# Patient Record
Sex: MALE | Race: WHITE | NOT HISPANIC OR LATINO
[De-identification: names, ages, dates, MRNs, and addresses within clinical notes are randomized per-mention and may not be internally consistent; named-entity substitution may affect disease eponyms.]

---

## 2017-05-02 ENCOUNTER — RX RENEWAL (OUTPATIENT)
Age: 72
End: 2017-05-02

## 2017-05-23 ENCOUNTER — INPATIENT (INPATIENT)
Facility: HOSPITAL | Age: 72
LOS: 2 days | Discharge: HOME | DRG: 195 | End: 2017-05-26
Attending: INTERNAL MEDICINE | Admitting: INTERNAL MEDICINE
Payer: MEDICARE

## 2017-05-23 VITALS
OXYGEN SATURATION: 96 % | TEMPERATURE: 101 F | HEART RATE: 88 BPM | HEIGHT: 72 IN | RESPIRATION RATE: 19 BRPM | WEIGHT: 171.96 LBS | SYSTOLIC BLOOD PRESSURE: 107 MMHG | DIASTOLIC BLOOD PRESSURE: 64 MMHG

## 2017-05-23 LAB
ALBUMIN SERPL ELPH-MCNC: 3 G/DL — LOW (ref 3.3–5)
ALP SERPL-CCNC: 71 U/L — SIGNIFICANT CHANGE UP (ref 40–120)
ALT FLD-CCNC: 21 U/L — SIGNIFICANT CHANGE UP (ref 12–78)
ANION GAP SERPL CALC-SCNC: 6 MMOL/L — SIGNIFICANT CHANGE UP (ref 5–17)
APPEARANCE UR: CLEAR — SIGNIFICANT CHANGE UP
APTT BLD: 31.3 SEC — SIGNIFICANT CHANGE UP (ref 27.5–37.4)
AST SERPL-CCNC: 26 U/L — SIGNIFICANT CHANGE UP (ref 15–37)
BACTERIA # UR AUTO: ABNORMAL
BASOPHILS # BLD AUTO: 0 K/UL — SIGNIFICANT CHANGE UP (ref 0–0.2)
BASOPHILS NFR BLD AUTO: 0.5 % — SIGNIFICANT CHANGE UP (ref 0–2)
BILIRUB SERPL-MCNC: 0.4 MG/DL — SIGNIFICANT CHANGE UP (ref 0.2–1.2)
BILIRUB UR-MCNC: NEGATIVE — SIGNIFICANT CHANGE UP
BUN SERPL-MCNC: 20 MG/DL — SIGNIFICANT CHANGE UP (ref 7–23)
CALCIUM SERPL-MCNC: 8.9 MG/DL — SIGNIFICANT CHANGE UP (ref 8.5–10.1)
CHLORIDE SERPL-SCNC: 102 MMOL/L — SIGNIFICANT CHANGE UP (ref 96–108)
CO2 SERPL-SCNC: 33 MMOL/L — HIGH (ref 22–31)
COLOR SPEC: YELLOW — SIGNIFICANT CHANGE UP
CREAT SERPL-MCNC: 0.84 MG/DL — SIGNIFICANT CHANGE UP (ref 0.5–1.3)
DIFF PNL FLD: ABNORMAL
EOSINOPHIL # BLD AUTO: 0 K/UL — SIGNIFICANT CHANGE UP (ref 0–0.5)
EOSINOPHIL NFR BLD AUTO: 0.5 % — SIGNIFICANT CHANGE UP (ref 0–6)
EPI CELLS # UR: NEGATIVE — SIGNIFICANT CHANGE UP
GLUCOSE SERPL-MCNC: 101 MG/DL — HIGH (ref 70–99)
GLUCOSE UR QL: NEGATIVE — SIGNIFICANT CHANGE UP
HCT VFR BLD CALC: 41.8 % — SIGNIFICANT CHANGE UP (ref 39–50)
HGB BLD-MCNC: 13.7 G/DL — SIGNIFICANT CHANGE UP (ref 13–17)
INR BLD: 1.02 RATIO — SIGNIFICANT CHANGE UP (ref 0.88–1.16)
KETONES UR-MCNC: ABNORMAL
LACTATE SERPL-SCNC: 1 MMOL/L — SIGNIFICANT CHANGE UP (ref 0.7–2)
LEUKOCYTE ESTERASE UR-ACNC: NEGATIVE — SIGNIFICANT CHANGE UP
LYMPHOCYTES # BLD AUTO: 0.6 K/UL — LOW (ref 1–3.3)
LYMPHOCYTES # BLD AUTO: 6.7 % — LOW (ref 13–44)
MCHC RBC-ENTMCNC: 31.9 PG — SIGNIFICANT CHANGE UP (ref 27–34)
MCHC RBC-ENTMCNC: 32.8 GM/DL — SIGNIFICANT CHANGE UP (ref 32–36)
MCV RBC AUTO: 97.4 FL — SIGNIFICANT CHANGE UP (ref 80–100)
MONOCYTES # BLD AUTO: 0.9 K/UL — SIGNIFICANT CHANGE UP (ref 0–0.9)
MONOCYTES NFR BLD AUTO: 10 % — HIGH (ref 1–9)
NEUTROPHILS # BLD AUTO: 7.4 K/UL — SIGNIFICANT CHANGE UP (ref 1.8–7.4)
NEUTROPHILS NFR BLD AUTO: 82.4 % — HIGH (ref 43–77)
NITRITE UR-MCNC: NEGATIVE — SIGNIFICANT CHANGE UP
PH UR: 6 — SIGNIFICANT CHANGE UP (ref 5–8)
PLATELET # BLD AUTO: 188 K/UL — SIGNIFICANT CHANGE UP (ref 150–400)
POTASSIUM SERPL-MCNC: 4.2 MMOL/L — SIGNIFICANT CHANGE UP (ref 3.5–5.3)
POTASSIUM SERPL-SCNC: 4.2 MMOL/L — SIGNIFICANT CHANGE UP (ref 3.5–5.3)
PROT SERPL-MCNC: 6.5 G/DL — SIGNIFICANT CHANGE UP (ref 6–8.3)
PROT UR-MCNC: NEGATIVE — SIGNIFICANT CHANGE UP
PROTHROM AB SERPL-ACNC: 11.1 SEC — SIGNIFICANT CHANGE UP (ref 9.8–12.7)
RBC # BLD: 4.3 M/UL — SIGNIFICANT CHANGE UP (ref 4.2–5.8)
RBC # FLD: 12.4 % — SIGNIFICANT CHANGE UP (ref 10.3–14.5)
RBC CASTS # UR COMP ASSIST: ABNORMAL /HPF (ref 0–4)
SODIUM SERPL-SCNC: 141 MMOL/L — SIGNIFICANT CHANGE UP (ref 135–145)
SP GR SPEC: 1.01 — SIGNIFICANT CHANGE UP (ref 1.01–1.02)
UROBILINOGEN FLD QL: NEGATIVE — SIGNIFICANT CHANGE UP
WBC # BLD: 8.9 K/UL — SIGNIFICANT CHANGE UP (ref 3.8–10.5)
WBC # FLD AUTO: 8.9 K/UL — SIGNIFICANT CHANGE UP (ref 3.8–10.5)
WBC UR QL: NEGATIVE — SIGNIFICANT CHANGE UP

## 2017-05-23 PROCEDURE — 71020: CPT | Mod: 26

## 2017-05-23 RX ORDER — AZITHROMYCIN 500 MG/1
500 TABLET, FILM COATED ORAL ONCE
Qty: 0 | Refills: 0 | Status: COMPLETED | OUTPATIENT
Start: 2017-05-23 | End: 2017-05-23

## 2017-05-23 RX ORDER — SODIUM CHLORIDE 9 MG/ML
1000 INJECTION INTRAMUSCULAR; INTRAVENOUS; SUBCUTANEOUS ONCE
Qty: 0 | Refills: 0 | Status: COMPLETED | OUTPATIENT
Start: 2017-05-23 | End: 2017-05-23

## 2017-05-23 RX ORDER — CEFTRIAXONE 500 MG/1
1 INJECTION, POWDER, FOR SOLUTION INTRAMUSCULAR; INTRAVENOUS ONCE
Qty: 0 | Refills: 0 | Status: COMPLETED | OUTPATIENT
Start: 2017-05-23 | End: 2017-05-23

## 2017-05-23 RX ADMIN — AZITHROMYCIN 255 MILLIGRAM(S): 500 TABLET, FILM COATED ORAL at 21:56

## 2017-05-23 RX ADMIN — CEFTRIAXONE 100 GRAM(S): 500 INJECTION, POWDER, FOR SOLUTION INTRAMUSCULAR; INTRAVENOUS at 21:10

## 2017-05-23 RX ADMIN — SODIUM CHLORIDE 1000 MILLILITER(S): 9 INJECTION INTRAMUSCULAR; INTRAVENOUS; SUBCUTANEOUS at 21:20

## 2017-05-23 NOTE — ED PROVIDER NOTE - CARE PLAN
Principal Discharge DX:	Fever and chills Principal Discharge DX:	Fever and chills  Secondary Diagnosis:	PNA (pneumonia)

## 2017-05-23 NOTE — ED ADULT NURSE NOTE - OBJECTIVE STATEMENT
received pt with c/o fever  pt evaluated and blood work drawned and sent to lab ua and cxs sent to lab xray done pt medicated  as ordered

## 2017-05-23 NOTE — ED PROVIDER NOTE - OBJECTIVE STATEMENT
pt c/o fevers and chills x few hrs tonight with generalized weakness. no ha, cp, sob, cough, d/n/v, abd pain, rash, diarrhea, dysuria, frequency. pt c/o resp congestion x few days  pmd - sellers

## 2017-05-24 DIAGNOSIS — I21.3 ST ELEVATION (STEMI) MYOCARDIAL INFARCTION OF UNSPECIFIED SITE: ICD-10-CM

## 2017-05-24 DIAGNOSIS — E78.5 HYPERLIPIDEMIA, UNSPECIFIED: ICD-10-CM

## 2017-05-24 DIAGNOSIS — F31.9 BIPOLAR DISORDER, UNSPECIFIED: ICD-10-CM

## 2017-05-24 DIAGNOSIS — J18.9 PNEUMONIA, UNSPECIFIED ORGANISM: ICD-10-CM

## 2017-05-24 DIAGNOSIS — R50.9 FEVER, UNSPECIFIED: ICD-10-CM

## 2017-05-24 DIAGNOSIS — Z29.9 ENCOUNTER FOR PROPHYLACTIC MEASURES, UNSPECIFIED: ICD-10-CM

## 2017-05-24 LAB
ANION GAP SERPL CALC-SCNC: 5 MMOL/L — SIGNIFICANT CHANGE UP (ref 5–17)
BUN SERPL-MCNC: 16 MG/DL — SIGNIFICANT CHANGE UP (ref 7–23)
CALCIUM SERPL-MCNC: 8.8 MG/DL — SIGNIFICANT CHANGE UP (ref 8.5–10.1)
CHLORIDE SERPL-SCNC: 106 MMOL/L — SIGNIFICANT CHANGE UP (ref 96–108)
CO2 SERPL-SCNC: 33 MMOL/L — HIGH (ref 22–31)
CREAT SERPL-MCNC: 0.84 MG/DL — SIGNIFICANT CHANGE UP (ref 0.5–1.3)
GLUCOSE SERPL-MCNC: 96 MG/DL — SIGNIFICANT CHANGE UP (ref 70–99)
HCT VFR BLD CALC: 39.4 % — SIGNIFICANT CHANGE UP (ref 39–50)
HGB BLD-MCNC: 13 G/DL — SIGNIFICANT CHANGE UP (ref 13–17)
MCHC RBC-ENTMCNC: 32 PG — SIGNIFICANT CHANGE UP (ref 27–34)
MCHC RBC-ENTMCNC: 32.9 GM/DL — SIGNIFICANT CHANGE UP (ref 32–36)
MCV RBC AUTO: 97.2 FL — SIGNIFICANT CHANGE UP (ref 80–100)
PLATELET # BLD AUTO: 170 K/UL — SIGNIFICANT CHANGE UP (ref 150–400)
POTASSIUM SERPL-MCNC: 4.3 MMOL/L — SIGNIFICANT CHANGE UP (ref 3.5–5.3)
POTASSIUM SERPL-SCNC: 4.3 MMOL/L — SIGNIFICANT CHANGE UP (ref 3.5–5.3)
PROCALCITONIN SERPL-MCNC: 0.49 NG/ML — HIGH (ref 0–0.05)
RAPID RVP RESULT: DETECTED
RBC # BLD: 4.06 M/UL — LOW (ref 4.2–5.8)
RBC # FLD: 12.2 % — SIGNIFICANT CHANGE UP (ref 10.3–14.5)
RV+EV RNA SPEC QL NAA+PROBE: DETECTED
SODIUM SERPL-SCNC: 144 MMOL/L — SIGNIFICANT CHANGE UP (ref 135–145)
WBC # BLD: 9.5 K/UL — SIGNIFICANT CHANGE UP (ref 3.8–10.5)
WBC # FLD AUTO: 9.5 K/UL — SIGNIFICANT CHANGE UP (ref 3.8–10.5)

## 2017-05-24 PROCEDURE — 99285 EMERGENCY DEPT VISIT HI MDM: CPT

## 2017-05-24 PROCEDURE — 71250 CT THORAX DX C-: CPT | Mod: 26

## 2017-05-24 PROCEDURE — 93010 ELECTROCARDIOGRAM REPORT: CPT

## 2017-05-24 RX ORDER — ASPIRIN/CALCIUM CARB/MAGNESIUM 324 MG
1 TABLET ORAL
Qty: 0 | Refills: 0 | COMMUNITY

## 2017-05-24 RX ORDER — ASPIRIN/CALCIUM CARB/MAGNESIUM 324 MG
81 TABLET ORAL
Qty: 0 | Refills: 0 | Status: DISCONTINUED | OUTPATIENT
Start: 2017-05-24 | End: 2017-05-26

## 2017-05-24 RX ORDER — CEFTRIAXONE 500 MG/1
1 INJECTION, POWDER, FOR SOLUTION INTRAMUSCULAR; INTRAVENOUS EVERY 24 HOURS
Qty: 0 | Refills: 0 | Status: COMPLETED | OUTPATIENT
Start: 2017-05-24 | End: 2017-05-25

## 2017-05-24 RX ORDER — DIVALPROEX SODIUM 500 MG/1
250 TABLET, DELAYED RELEASE ORAL THREE TIMES A DAY
Qty: 0 | Refills: 0 | Status: DISCONTINUED | OUTPATIENT
Start: 2017-05-24 | End: 2017-05-25

## 2017-05-24 RX ORDER — IPRATROPIUM/ALBUTEROL SULFATE 18-103MCG
3 AEROSOL WITH ADAPTER (GRAM) INHALATION EVERY 8 HOURS
Qty: 0 | Refills: 0 | Status: DISCONTINUED | OUTPATIENT
Start: 2017-05-24 | End: 2017-05-26

## 2017-05-24 RX ORDER — AZITHROMYCIN 500 MG/1
500 TABLET, FILM COATED ORAL EVERY 24 HOURS
Qty: 0 | Refills: 0 | Status: DISCONTINUED | OUTPATIENT
Start: 2017-05-24 | End: 2017-05-25

## 2017-05-24 RX ORDER — DIVALPROEX SODIUM 500 MG/1
1 TABLET, DELAYED RELEASE ORAL
Qty: 0 | Refills: 0 | COMMUNITY

## 2017-05-24 RX ORDER — ENOXAPARIN SODIUM 100 MG/ML
40 INJECTION SUBCUTANEOUS DAILY
Qty: 0 | Refills: 0 | Status: DISCONTINUED | OUTPATIENT
Start: 2017-05-24 | End: 2017-05-26

## 2017-05-24 RX ORDER — DIVALPROEX SODIUM 500 MG/1
0 TABLET, DELAYED RELEASE ORAL
Qty: 0 | Refills: 0 | COMMUNITY

## 2017-05-24 RX ORDER — ACETAMINOPHEN 500 MG
650 TABLET ORAL ONCE
Qty: 0 | Refills: 0 | Status: COMPLETED | OUTPATIENT
Start: 2017-05-24 | End: 2017-05-24

## 2017-05-24 RX ORDER — ACETAMINOPHEN 500 MG
650 TABLET ORAL EVERY 6 HOURS
Qty: 0 | Refills: 0 | Status: DISCONTINUED | OUTPATIENT
Start: 2017-05-24 | End: 2017-05-26

## 2017-05-24 RX ORDER — LACTOBACILLUS ACIDOPHILUS 100MM CELL
1 CAPSULE ORAL
Qty: 0 | Refills: 0 | Status: DISCONTINUED | OUTPATIENT
Start: 2017-05-24 | End: 2017-05-26

## 2017-05-24 RX ORDER — ESCITALOPRAM OXALATE 10 MG/1
10 TABLET, FILM COATED ORAL DAILY
Qty: 0 | Refills: 0 | Status: DISCONTINUED | OUTPATIENT
Start: 2017-05-24 | End: 2017-05-26

## 2017-05-24 RX ORDER — SODIUM CHLORIDE 9 MG/ML
1000 INJECTION INTRAMUSCULAR; INTRAVENOUS; SUBCUTANEOUS
Qty: 0 | Refills: 0 | Status: DISCONTINUED | OUTPATIENT
Start: 2017-05-24 | End: 2017-05-24

## 2017-05-24 RX ORDER — ATORVASTATIN CALCIUM 80 MG/1
1 TABLET, FILM COATED ORAL
Qty: 0 | Refills: 0 | COMMUNITY

## 2017-05-24 RX ORDER — ESCITALOPRAM OXALATE 10 MG/1
1 TABLET, FILM COATED ORAL
Qty: 0 | Refills: 0 | COMMUNITY

## 2017-05-24 RX ORDER — ESCITALOPRAM OXALATE 10 MG/1
0 TABLET, FILM COATED ORAL
Qty: 0 | Refills: 0 | COMMUNITY

## 2017-05-24 RX ORDER — ATORVASTATIN CALCIUM 80 MG/1
0 TABLET, FILM COATED ORAL
Qty: 0 | Refills: 0 | COMMUNITY

## 2017-05-24 RX ORDER — ATORVASTATIN CALCIUM 80 MG/1
20 TABLET, FILM COATED ORAL AT BEDTIME
Qty: 0 | Refills: 0 | Status: DISCONTINUED | OUTPATIENT
Start: 2017-05-24 | End: 2017-05-26

## 2017-05-24 RX ADMIN — CEFTRIAXONE 100 GRAM(S): 500 INJECTION, POWDER, FOR SOLUTION INTRAMUSCULAR; INTRAVENOUS at 21:01

## 2017-05-24 RX ADMIN — SODIUM CHLORIDE 75 MILLILITER(S): 9 INJECTION INTRAMUSCULAR; INTRAVENOUS; SUBCUTANEOUS at 09:26

## 2017-05-24 RX ADMIN — AZITHROMYCIN 255 MILLIGRAM(S): 500 TABLET, FILM COATED ORAL at 21:01

## 2017-05-24 RX ADMIN — Medication 1 TABLET(S): at 09:26

## 2017-05-24 RX ADMIN — ENOXAPARIN SODIUM 40 MILLIGRAM(S): 100 INJECTION SUBCUTANEOUS at 12:20

## 2017-05-24 RX ADMIN — Medication 81 MILLIGRAM(S): at 06:32

## 2017-05-24 RX ADMIN — Medication 650 MILLIGRAM(S): at 02:39

## 2017-05-24 RX ADMIN — DIVALPROEX SODIUM 250 MILLIGRAM(S): 500 TABLET, DELAYED RELEASE ORAL at 17:25

## 2017-05-24 RX ADMIN — Medication 3 MILLILITER(S): at 08:11

## 2017-05-24 RX ADMIN — Medication 3 MILLILITER(S): at 16:42

## 2017-05-24 RX ADMIN — ESCITALOPRAM OXALATE 10 MILLIGRAM(S): 10 TABLET, FILM COATED ORAL at 12:21

## 2017-05-24 RX ADMIN — DIVALPROEX SODIUM 250 MILLIGRAM(S): 500 TABLET, DELAYED RELEASE ORAL at 21:01

## 2017-05-24 RX ADMIN — DIVALPROEX SODIUM 250 MILLIGRAM(S): 500 TABLET, DELAYED RELEASE ORAL at 06:32

## 2017-05-24 RX ADMIN — Medication 1 TABLET(S): at 17:25

## 2017-05-24 RX ADMIN — Medication 3 MILLILITER(S): at 23:55

## 2017-05-24 RX ADMIN — ATORVASTATIN CALCIUM 20 MILLIGRAM(S): 80 TABLET, FILM COATED ORAL at 21:01

## 2017-05-24 RX ADMIN — Medication 81 MILLIGRAM(S): at 17:25

## 2017-05-24 NOTE — H&P ADULT - NEGATIVE ENMT SYMPTOMS
no dysphagia/no sinus symptoms/no vertigo/no ear pain/no dry mouth/no throat pain/no hearing difficulty/no tinnitus

## 2017-05-24 NOTE — H&P ADULT - PROBLEM SELECTOR PLAN 1
Likely CAP  Admit to medicine  IV azithro , rocephin  Ct chest  Tylenol for fever PRN  AM labs  Blood cultures sent Likely CAP with ac febrile illness & + RVP  Admit to medicine  IV azithro , rocephin continue  Ct chest-+ RT Lung PNA  Tylenol for fever PRN  AM labs  Blood cultures sent

## 2017-05-24 NOTE — H&P ADULT - HISTORY OF PRESENT ILLNESS
71 year old male with PMH of MI , HLD , Bipolar disorder presents to the er for fever , chills and sob that started 8pm yesterday when patient was at his sons reharsal band function. Patient states that he started feeling chills and had difficulty ambulating and SOB at rest. Patient has been having URI , nasal congestion and white mucus (productive cough for the past week. Patient has been taking tylenol cold with minimal relief. Denies chest pain , headaches, lightheadeness , n/v , abdominal pain, diarrhea No recent travel. No sickcontacts.     In the Ed, Vitals significant for Tmax 101.3  Labs normal  Sat 96 on RA  CXR - unofficial read- Right middle lobe infiltrate? 71 year old male with PMH of MI , HLD , Bipolar disorder presents to the er for fever , chills and sob that started 8pm yesterday when patient was at his sons reharsal band function. Patient states that he started feeling chills and had difficulty ambulating and SOB at rest. Patient has been having URI , nasal congestion and white mucus (productive cough for the past week. Patient has been taking tylenol cold with minimal relief. Denies chest pain , headaches, lightheadeness , n/v , abdominal pain, diarrhea No recent travel. No sickcontacts.     In the Ed, Vitals significant for Tmax 101.3  Labs normal  Sat 96 on RA  CXR - unofficial read by ER doc - Right  lobe infiltrate? 71 year old male with PMH of MI , HLD , Bipolar disorder presents to the er for fever , chills and sob that started 8pm yesterday when patient was at his sons reharsal band function. Patient states that he started feeling chills and had difficulty ambulating and SOB at rest. Patient has been having URI ,nasal congestion and white mucus (productive cough for the past week. Patient has been taking tylenol cold with minimal relief. Denies chest pain , headaches, lightheadedness , n/v , abdominal pain, diarrhea No recent travel. as per pt he had sick contacts with his grand children 1 week ago.     In the Ed, Vitals significant for Tmax 101.3  Labs normal, Pt got IV Zithro & Rocephin  Sat 96 on RA  CXR - unofficial read by ER doc - Right  lobe infiltrate?

## 2017-05-24 NOTE — H&P ADULT - ASSESSMENT
71 year old male with PMH of MI , HLD , Bipolar disorder admitted for CAP 71 year old male with PMH of MI , HLD , Bipolar disorder admitted for r/o CAP 71 year old male with PMH of MI , HLD , Bipolar disorder admitted for RT Lung CAP, + RVP

## 2017-05-24 NOTE — PROGRESS NOTE ADULT - ASSESSMENT
71 year old male with PMH of MI , HLD , Bipolar disorder admitted for CAP 71 year old male with PMH of MI , HLD , Bipolar disorder admitted for CAP: 71 year old male with PMH of MI , HLD , Bipolar disorder admitted for CAP:RT LL with Ac febrile Viral illness.

## 2017-05-24 NOTE — H&P ADULT - RS GEN PE MLT RESP DETAILS PC
breath sounds equal/no rales/no subcutaneous emphysema/no rhonchi/normal/airway patent/no wheezes/respirations non-labored/clear to auscultation bilaterally rhonchi/no rales/no wheezes/diminished breath sounds, R/normal/clear to auscultation bilaterally/breath sounds equal/respirations non-labored/airway patent/no subcutaneous emphysema

## 2017-05-24 NOTE — PROGRESS NOTE ADULT - PROBLEM SELECTOR PLAN 2
Continue Divalproex and lexapro Continue Divalproex and lexapro daily Continue Divalproex and Lexapro daily

## 2017-05-24 NOTE — CONSULT NOTE ADULT - SUBJECTIVE AND OBJECTIVE BOX
Infectious Diseases Consult by Graham Lopez MD    Reason for Consult : Febrile syndrome with possible pneumonia      HPI:  71 year old male with PMH of MI , HLD , Bipolar disorder presents to the ER with fever , chills and sob that started 8pm yesterday when patient was at his sons rehearsal band function. Patient states that he started feeling chills , very weak and had difficulty ambulating and getting up and SOB at rest. Patient has had a  URI , nasal congestion and white mucus (productive cough for the past week. Patient has been taking Tylenol cold with minimal relief. Denies chest pain , headaches, lightheadedness , n/v , abdominal pain, diarrhea No recent travel. No sick contacts or travel .     In the Ed, Vitals significant for Tmax 101.3  Labs normal  Sat 96 on RA  CXR - possible  Right perihilar infiltrate?     RVP + for rhinovirus     Past Medical & Surgical Hx:  PAST MEDICAL & SURGICAL HISTORY:  MI (myocardial infarction)  H/O gastric ulcer: back in   Bipolar disorder  History of sub total gastrectomy: in , for gastric ulcer      Social History-- Retired   EtOH: denies   Tobacco: ex smoker quit 30 years ago smoked for less than 15 years   Drug Use: denies     FAMILY HISTORY:  Family history of heart attack (Father)      Allergies    No Known Allergies    Intolerances    Home/ Out patient  Medications :   Patient Currently Takes Medications as of 24-May-2017 02:15 documented in Prescription Writer  · 	atorvastatin 20 mg oral tablet: 1 tab(s) orally once a day, Last Dose Taken:    · 	Lexapro 10 mg oral tablet: 1 tab(s) orally once a day, Last Dose Taken:    · 	aspirin 81 mg oral tablet: 1 tab(s) orally 2 times a day, Last Dose Taken:            · 	Depakote 250 mg oral delayed release tablet: 1 tab(s) orally 3 times a day, Last Dose Taken:      Current Inpatient Medications :    ANTIBIOTICS:   cefTRIAXone   IVPB 1Gram(s) IV Intermittent every 24 hours  azithromycin  IVPB 500milliGRAM(s) IV Intermittent every 24 hours      OTHER RELEVANT MEDICATIONS :  acetaminophen   Tablet 650milliGRAM(s) Oral every 6 hours PRN  aspirin enteric coated 81milliGRAM(s) Oral two times a day  diVALproex DR 250milliGRAM(s) Oral three times a day  escitalopram 10milliGRAM(s) Oral daily  atorvastatin 20milliGRAM(s) Oral at bedtime  sodium chloride 0.9%. 1000milliLiter(s) IV Continuous <Continuous>  enoxaparin Injectable 40milliGRAM(s) SubCutaneous daily  ALBUTerol/ipratropium for Nebulization 3milliLiter(s) Nebulizer every 8 hours        ROS:  CONSTITUTIONAL:  Positive for fever and chills, feels weak, good appetite  EYES:  Negative  blurry vision or double vision  CARDIOVASCULAR:  Negative for chest pain or palpitations  RESPIRATORY:  positive  for cough, NO wheezing, or SOB   GASTROINTESTINAL:  Negative for nausea, vomiting, diarrhea, constipation, or abdominal pain  GENITOURINARY:  Negative frequency, urgency , dysuria or hematuria   NEUROLOGIC:  Positive for  dizziness, lightheadedness  All other systems were reviewed and are negative          I&O's Detail      Physical Exam:  Vital Signs Last 24 Hrs  T(C): 36.9, Max: 38.5 ( @ 21:35)  T(F): 98.5, Max: 101.3 ( @ 21:35)  HR: 71 (63 - 88)  BP: 131/- (107/64 - 131/-)  BP(mean): 71 (71 - 71)  RR: 17 (16 - 19)  SpO2: 96% (96% - 97%)  Height (cm): 182.9 ( @ 21:35)  Weight (kg): 78 ( @ 21:35)  BMI (kg/m2): 23.3 ( @ 21:35)  BSA (m2): 2 ( @ 21:35)  GEN: NAD, pleasant  HEENT: normocephalic and atraumatic. EOMI. GILSON. Conjuctival  & sclera clear,Moist mucosa. Clear Posterior pharynx.  NECK: Supple. No carotid bruits.  No lymphadenopathy or thyromegaly.  LUNGS: Clear to auscultation.  HEART: Regular rate and rhythm without murmur.  ABDOMEN: Soft, nontender, and nondistended.  Positive bowel sounds.  No hepatosplenomegaly was noted.  EXTREMITIES: Without any cyanosis, clubbing, rash, lesions or edema. Peripheral pulses  2 +  NEUROLOGIC: A & O x 3 , No focal neurological deficits   SKIN: No ulceration or induration present.      Labs:       144  |  106  |  16  ----------------------------<  96  4.3   |  33<H>  |  0.84    Ca    8.8      24 May 2017 07:45    TPro  6.5  /  Alb  3.0<L>  /  TBili  0.4  /  DBili  x   /  AST  26  /  ALT  21  /  AlkPhos  71                            13.0   9.5   )-----------( 170      ( 24 May 2017 06:03 )             39.4       PT/INR - ( 23 May 2017 23:09 )   PT: 11.1 sec;   INR: 1.02 ratio         PTT - ( 23 May 2017 23:09 )  PTT:31.3 sec  Urinalysis Basic - ( 23 May 2017 23:01 )    Color: Yellow / Appearance: Clear / S.015 / pH: x  Gluc: x / Ketone: Trace  / Bili: Negative / Urobili: Negative   Blood: x / Protein: Negative / Nitrite: Negative   Leuk Esterase: Negative / RBC: 11-25 /HPF / WBC Negative   Sq Epi: x / Non Sq Epi: Negative / Bacteria: Occasional      LIVER FUNCTIONS - ( 23 May 2017 23:01 )  Alb: 3.0 g/dL / Pro: 6.5 g/dL / ALK PHOS: 71 U/L / ALT: 21 U/L / AST: 26 U/L / GGT: x           Lactate, Blood (17 @ 23:01)    Lactate, Blood: 1.0 mmol/L    Procalcitonin, Serum (17 @ 07:45)    Procalcitonin, Serum: 0.49: PCT Interpretation    Rapid Respiratory Viral Panel (17 @ 02:24)    Entero/Rhinovirus (RapRVP): Detected        RADIOLOGY & ADDITIONAL STUDIES:  EXAM:  CT CHEST                        PROCEDURE DATE:  2017      INTERPRETATION:  CLINICAL INFORMATION:  Fever.    PROCEDURE:  Using multislice helical CT, 2.5 mm sections were obtained   from the thoracic inlet through the lung bases.  Multiplanar reformatted images.    COMPARISON: Chest radiograph 2017.    FINDINGS:      There is focal nodular airspace consolidation in the right upper lobe,   abutting the major fissure. In addition, there are small areas of poorly   defined ground glass centrilobular nodular opacities right lower lobe, as   well as centrilobular tree-in-bud nodular opacities right upper lobe,   which could reflect infectious/inflammatory airway disease.    There are minimal atelectatic changes at both lung bases.    There is scarring with pleural-based calcifications both lung apices.    The central airways remain patent. No endobronchial lesion is noted.    There are clusters of subcentimeter pretracheal/precarinal mediastinal   lymph nodes.  The evaluation of the pulmonary hilum is limited without intravenous   contrast. There is mildly distended, fluid-filled esophagus. Surgical   anastomosis is present, patient is status post partial gastrectomy.  Recommend further clinical correlation.    Impression:    Right upper lobe airspace consolidation with smaller areas of ground glass   and centrilobular/tree-in-bud nodular opacities, findings likely   represent pneumonia and infectious/inflammatory airway disease.  Recommend short interval follow-up chest CT examination in 10-12 weeks   following the appropriate clinical course of treatment.    JESICA PRICE M.D., ATTENDING RADIOLOGIST  This document has been electronically signed. May 24 2017  7:46AM            Assessment :     71 year old male with PMH of MI , HLD , Bipolar disorder admitted with febrile syndrome and weakness , has had a URI for 1 week, RVP + for rhinovirus , now with cough. CXR shows faint RUL pneumonia with chronic tree and bud appearance suggestive of chronic bronchitis or bronchiectasis. In presence of normal  WBC this could christina atypical pneumonia or viral pneumonia .    Plan:  - send urine for legionella antigen   - follow c s, if negative probably change to po antibiotics in 24-48 hours    - repeat CXR in 1 week   - OOB and ambulate  - DC IVF     Continue with present supportive regime .  Appropriate use of antibiotics and adverse effects reviewed.      I have discussed the above plan of care with patient in detail. He  expressed understanding of the treatment plan . Risks, benefits and alternatives discussed in detail. I have asked if he has any questions or concerns and appropriately addressed them to the best of my ability .      > 45 minutes spent in direct patient care reviewing  the notes, lab data/ imaging , discussion with multidisciplinary team. All questions were addressed and answered to the best of my capacity .    Thank you for allowing me to participate in the care of your patient .      Graham Lopez MD  [phone]

## 2017-05-24 NOTE — ED ADULT NURSE REASSESSMENT NOTE - NS ED NURSE REASSESS COMMENT FT1
pt admitted report given and vital signs stable awaiting transfer to floor
pt admitted awaiting bed asignment

## 2017-05-24 NOTE — H&P ADULT - GASTROINTESTINAL DETAILS
bowel sounds normal/no distention/soft/nontender/no organomegaly/normal/no masses palpable no bruit/bowel sounds normal/no organomegaly/no rigidity/no masses palpable/soft/no guarding/no distention/nontender/normal

## 2017-05-24 NOTE — PROGRESS NOTE ADULT - SUBJECTIVE AND OBJECTIVE BOX
Patient is a 71y old  Male who presents with a chief complaint of c/o shaking of both hand and weakness from yesterday (24 May 2017 06:35)      INTERVAL HPI:   71 year old male with PMH of MI , HLD , Bipolar disorder presents to the er for fever , chills and sob that started 8pm yesterday when patient was at his sons rehars[employer] function. Patient states that he started feeling chills and had difficulty ambulating and SOB at rest. Patient has been having URI , nasal congestion and white mucus (productive cough for the past week. Patient has been taking tylenol cold with minimal relief. Denies chest pain , headaches, lightheadeness , n/v , abdominal pain, diarrhea No recent travel. No sickcontacts.     In the Ed, Vitals significant for Tmax 101.3  Labs normal  Patient has been admitted for pneumonia treatment:     Today, pt seen and assessed at bedside; pt states that he feels:         OVERNIGHT EVENTS:    MEDICATIONS  (STANDING):  cefTRIAXone   IVPB 1Gram(s) IV Intermittent every 24 hours  azithromycin  IVPB 500milliGRAM(s) IV Intermittent every 24 hours  aspirin enteric coated 81milliGRAM(s) Oral two times a day  diVALproex DR 250milliGRAM(s) Oral three times a day  escitalopram 10milliGRAM(s) Oral daily  atorvastatin 20milliGRAM(s) Oral at bedtime  sodium chloride 0.9%. 1000milliLiter(s) IV Continuous <Continuous>  enoxaparin Injectable 40milliGRAM(s) SubCutaneous daily  ALBUTerol/ipratropium for Nebulization 3milliLiter(s) Nebulizer every 8 hours  lactobacillus acidophilus 1Tablet(s) Oral two times a day with meals    MEDICATIONS  (PRN):  acetaminophen   Tablet 650milliGRAM(s) Oral every 6 hours PRN For Temp greater than 38 C (100.4 F)      Allergies    No Known Allergies    Intolerances        REVIEW OF SYSTEMS:  CONSTITUTIONAL: No fever, No chills, No fatigue, No myalgia, No Body ache  EYES: No eye pain, visual disturbances, or discharge  ENMT:  No ear pain, No nose bleed, No vertigo; No sinus or throat pain, No Congestion  NECK: No pain, No stiffness  RESPIRATORY: No cough, wheezing, No  hemoptysis, No shortness of breath  CARDIOVASCULAR: No chest pain, palpitations  GASTROINTESTINAL: No abdominal or epigastric pain. No nausea, No vomiting; No diarrhea or constipation. [  ] BM  GENITOURINARY: No dysuria, No frequency, No urgency, No hematuria, or incontinence  NEUROLOGICAL: No headaches, No dizziness, No numbness, No tingling, No tremors, No weakness  EXT: No Swelling, No Pain, No Edema  SKIN:  [  ] No itching, burning, rashes, or lesions   MUSCULOSKELETAL: No joint pain or swelling; No muscle pain, No back pain, No extremity pain  PSYCHIATRIC: No depression, anxiety, mood swings or difficulty sleeping at night  PAIN SCALE: [  ] None  [  ] Other-  ROS Unable to obtain due to - [  ] Dementia  [  ] Lethargy  [  ] Sedated   REST OF REVIEW Of SYSTEM - [  ] Normal     Vital Signs Last 24 Hrs  T(C): 36.9, Max: 38.5 (05-23 @ 21:35)  T(F): 98.5, Max: 101.3 (05-23 @ 21:35)  HR: 71 (63 - 88)  BP: 131/- (107/64 - 131/-)  BP(mean): 71 (71 - 71)  RR: 17 (16 - 19)  SpO2: 96% (96% - 97%)  Finger Stick          PHYSICAL EXAM:  GENERAL:  [  ] NAD , [  ] well appearing, [  ] Agitated, [  ] Lethargy, [  ] confused   HEAD:  [  ] Normal, [  ] Other  EYES:  [  ] EOMI, [  ] PERRLA, [  ] conjunctiva and sclera clear normal, [  ] Other,  [  ] Pallor,[  ] Discharge  ENMT:  [  ] Normal, [  ] Moist mucous membranes, [  ] Good dentition, [  ] No Thrush  NECK:  [  ] Supple, [  ] No JVD, [  ] Normal thyroid, [  ] Lymphadenopathy [  ] Other  NERVOUS SYSTEM:  [  ] Alert & Oriented X3, [  ] Nonfocal   [  ] Confusion  [  ] Encephalopathic [  ] Sedated [  ] Other-  CHEST/LUNG:  [  ] Clear to auscultation bilaterally, [  ] No rales, [  ] No rhonchi  [  ]  No wheezing  HEART:  [  ] Regular rate and rhythm  [  ] irregular  [  ] No murmurs, rubs, or gallops, [  ] PPM in place (Mfr:  )  ABDOMEN:  [  ] Soft, [  ] Nontender, [  ] Nondistended, [  ]No mass, [  ] Bowel sounds present, [  ] obese  EXTREMITIES: [  ] 2+ Peripheral Pulses, No clubbing, cyanosis,  [  ] edema, [  ] PVD stasis skin changes  LYMPH: No lymphadenopathy noted  SKIN:  [  ] No rashes or lesions, [  ] Pressure Ulcers, [  ] echymosis, [  ] Other    DIET: DASH    LABS:                        13.0   9.5   )-----------( 170      ( 24 May 2017 06:03 )             39.4     24 May 2017 07:45    144    |  106    |  16     ----------------------------<  96     4.3     |  33     |  0.84     Ca    8.8        24 May 2017 07:45    TPro  6.5    /  Alb  3.0    /  TBili  0.4    /  DBili  x      /  AST  26     /  ALT  21     /  AlkPhos  71     23 May 2017 23:01    PT/INR - ( 23 May 2017 23:09 )   PT: 11.1 sec;   INR: 1.02 ratio         PTT - ( 23 May 2017 23:09 )  PTT:31.3 sec  Urinalysis Basic - ( 23 May 2017 23:01 )    Color: Yellow / Appearance: Clear / S.015 / pH: x  Gluc: x / Ketone: Trace  / Bili: Negative / Urobili: Negative   Blood: x / Protein: Negative / Nitrite: Negative   Leuk Esterase: Negative / RBC: 11-25 /HPF / WBC Negative   Sq Epi: x / Non Sq Epi: Negative / Bacteria: Occasional                RECENT CULTURES:        RESPIRATORY CULTURES:  human enterovirus/rhinovirus        cardiac Enzyme:        Anemia panel:          RADIOLOGY & ADDITIONAL TESTS:   CT Chest: Right upper lobe airspace consolidation with smaller areas of ground glass   and centrilobular/tree-in-bud nodular opacities, findings likely   represent pneumonia and infectious/inflammatory airway disease.  Recommend short interval follow-up chest CT examination in 10-12 weeks   following the appropriate clinical course of treatment.        HEALTH ISSUES - PROBLEM Dx:  Need for prophylactic measure: Need for prophylactic measure  MI (myocardial infarction): MI (myocardial infarction)  Bipolar disorder: Bipolar disorder  PNA (pneumonia): PNA (pneumonia)          Consultant(s) Notes Reviewed:  [  ] YES     Care Discussed with [X] Consultants  [  ] Patient  [  ] Family  [  ]   [  ] Social Service  [  ] RN, [  ] Physical Therapy  DVT PPX: [ x ] Lovenox, [  ] S C Heparin, [  ] Coumadin, [  ] Xarelto, [  ] Eliquis, [  ] SCD   Advanced directive: [  ] None, [  ] DNR/DNI Patient is a 71y old  Male who presents with a chief complaint of c/o shaking of both hand and weakness from yesterday (24 May 2017 06:35)    INTERVAL HPI: 71 year old male with PMH of MI , HLD , Bipolar disorder presents to the er for fever , chills and sob that started 8pm yesterday when patient was at his sons [employer]hars[employer] function. Patient states that he started feeling chills and had difficulty ambulating and SOB at rest. Patient has been having URI , nasal congestion and white mucus (productive cough for the past week. Patient has been taking tylenol cold with minimal relief. Denies chest pain , headaches, lightheadeness , n/v , abdominal pain, diarrhea No recent travel. No sickcontacts.     In the Ed, Vitals significant for Tmax 101.3  Labs normal  Patient has been admitted for pneumonia treatment:     Today, pt seen and assessed at bedside; pt states that he feels:         OVERNIGHT EVENTS:    MEDICATIONS  (STANDING):  cefTRIAXone   IVPB 1Gram(s) IV Intermittent every 24 hours  azithromycin  IVPB 500milliGRAM(s) IV Intermittent every 24 hours  aspirin enteric coated 81milliGRAM(s) Oral two times a day  diVALproex DR 250milliGRAM(s) Oral three times a day  escitalopram 10milliGRAM(s) Oral daily  atorvastatin 20milliGRAM(s) Oral at bedtime  sodium chloride 0.9%. 1000milliLiter(s) IV Continuous <Continuous>  enoxaparin Injectable 40milliGRAM(s) SubCutaneous daily  ALBUTerol/ipratropium for Nebulization 3milliLiter(s) Nebulizer every 8 hours  lactobacillus acidophilus 1Tablet(s) Oral two times a day with meals    MEDICATIONS  (PRN):  acetaminophen   Tablet 650milliGRAM(s) Oral every 6 hours PRN For Temp greater than 38 C (100.4 F)      Allergies    No Known Allergies    Intolerances        REVIEW OF SYSTEMS:  CONSTITUTIONAL: No fever, No chills, No fatigue, No myalgia, No Body ache  EYES: No eye pain, visual disturbances, or discharge  ENMT:  No ear pain, No nose bleed, No vertigo; No sinus or throat pain, No Congestion  NECK: No pain, No stiffness  RESPIRATORY: No cough, wheezing, No  hemoptysis, No shortness of breath  CARDIOVASCULAR: No chest pain, palpitations  GASTROINTESTINAL: No abdominal or epigastric pain. No nausea, No vomiting; No diarrhea or constipation. [  ] BM  GENITOURINARY: No dysuria, No frequency, No urgency, No hematuria, or incontinence  NEUROLOGICAL: No headaches, No dizziness, No numbness, No tingling, No tremors, No weakness  EXT: No Swelling, No Pain, No Edema  SKIN:  [  ] No itching, burning, rashes, or lesions   MUSCULOSKELETAL: No joint pain or swelling; No muscle pain, No back pain, No extremity pain  PSYCHIATRIC: No depression, anxiety, mood swings or difficulty sleeping at night  PAIN SCALE: [  ] None  [  ] Other-  ROS Unable to obtain due to - [  ] Dementia  [  ] Lethargy  [  ] Sedated   REST OF REVIEW Of SYSTEM - [  ] Normal     Vital Signs Last 24 Hrs  T(C): 36.9, Max: 38.5 (05-23 @ 21:35)  T(F): 98.5, Max: 101.3 (05-23 @ 21:35)  HR: 71 (63 - 88)  BP: 131/- (107/64 - 131/-)  BP(mean): 71 (71 - 71)  RR: 17 (16 - 19)  SpO2: 96% (96% - 97%)  Finger Stick          PHYSICAL EXAM:  GENERAL:  [  ] NAD , [  ] well appearing, [  ] Agitated, [  ] Lethargy, [  ] confused   HEAD:  [  ] Normal, [  ] Other  EYES:  [  ] EOMI, [  ] PERRLA, [  ] conjunctiva and sclera clear normal, [  ] Other,  [  ] Pallor,[  ] Discharge  ENMT:  [  ] Normal, [  ] Moist mucous membranes, [  ] Good dentition, [  ] No Thrush  NECK:  [  ] Supple, [  ] No JVD, [  ] Normal thyroid, [  ] Lymphadenopathy [  ] Other  NERVOUS SYSTEM:  [  ] Alert & Oriented X3, [  ] Nonfocal   [  ] Confusion  [  ] Encephalopathic [  ] Sedated [  ] Other-  CHEST/LUNG:  [  ] Clear to auscultation bilaterally, [  ] No rales, [  ] No rhonchi  [  ]  No wheezing  HEART:  [  ] Regular rate and rhythm  [  ] irregular  [  ] No murmurs, rubs, or gallops, [  ] PPM in place (Mfr:  )  ABDOMEN:  [  ] Soft, [  ] Nontender, [  ] Nondistended, [  ]No mass, [  ] Bowel sounds present, [  ] obese  EXTREMITIES: [  ] 2+ Peripheral Pulses, No clubbing, cyanosis,  [  ] edema, [  ] PVD stasis skin changes  LYMPH: No lymphadenopathy noted  SKIN:  [  ] No rashes or lesions, [  ] Pressure Ulcers, [  ] echymosis, [  ] Other    DIET: DASH    LABS:                        13.0   9.5   )-----------( 170      ( 24 May 2017 06:03 )             39.4     24 May 2017 07:45    144    |  106    |  16     ----------------------------<  96     4.3     |  33     |  0.84     Ca    8.8        24 May 2017 07:45    TPro  6.5    /  Alb  3.0    /  TBili  0.4    /  DBili  x      /  AST  26     /  ALT  21     /  AlkPhos  71     23 May 2017 23:01    PT/INR - ( 23 May 2017 23:09 )   PT: 11.1 sec;   INR: 1.02 ratio         PTT - ( 23 May 2017 23:09 )  PTT:31.3 sec  Urinalysis Basic - ( 23 May 2017 23:01 )    Color: Yellow / Appearance: Clear / S.015 / pH: x  Gluc: x / Ketone: Trace  / Bili: Negative / Urobili: Negative   Blood: x / Protein: Negative / Nitrite: Negative   Leuk Esterase: Negative / RBC: 11-25 /HPF / WBC Negative   Sq Epi: x / Non Sq Epi: Negative / Bacteria: Occasional                RECENT CULTURES:        RESPIRATORY CULTURES:  human enterovirus/rhinovirus        cardiac Enzyme:        Anemia panel:          RADIOLOGY & ADDITIONAL TESTS:   CT Chest: Right upper lobe airspace consolidation with smaller areas of ground glass   and centrilobular/tree-in-bud nodular opacities, findings likely   represent pneumonia and infectious/inflammatory airway disease.  Recommend short interval follow-up chest CT examination in 10-12 weeks   following the appropriate clinical course of treatment.        HEALTH ISSUES - PROBLEM Dx:  Need for prophylactic measure: Need for prophylactic measure  MI (myocardial infarction): MI (myocardial infarction)  Bipolar disorder: Bipolar disorder  PNA (pneumonia): PNA (pneumonia)          Consultant(s) Notes Reviewed:  [  ] YES     Care Discussed with [X] Consultants  [  ] Patient  [  ] Family  [  ]   [  ] Social Service  [  ] RN, [  ] Physical Therapy  DVT PPX: [ x ] Lovenox, [  ] S C Heparin, [  ] Coumadin, [  ] Xarelto, [  ] Eliquis, [  ] SCD   Advanced directive: [  ] None, [  ] DNR/DNI Patient is a 71y old  Male who presents with a chief complaint of c/o shaking of both hand and weakness from yesterday (24 May 2017 06:35)    INTERVAL HPI: 71 year old male with PMH of MI , HLD , Bipolar disorder presents to the er for fever , chills and sob that started 8pm yesterday when patient was at his sons [employer]hars[employer] function. Patient states that he started feeling chills and had difficulty ambulating and SOB at rest. Patient has been having URI , nasal congestion and white mucus (productive cough for the past week. Patient has been taking tylenol cold with minimal relief. Denies chest pain , headaches, lightheadeness , n/v , abdominal pain, diarrhea No recent travel. No sickcontacts.     In the Ed, Vitals significant for Tmax 101.3  Labs normal  Patient has been admitted for pneumonia treatment:     Today, pt seen and assessed at bedside; pt states that he feels well, has a resolving dry cough; States that he hasn't had any fevers, chills, CP, SOB,  abdominal pain, LE edema or any other issues.      OVERNIGHT EVENTS: NONE    MEDICATIONS  (STANDING):  cefTRIAXone   IVPB 1Gram(s) IV Intermittent every 24 hours  azithromycin  IVPB 500milliGRAM(s) IV Intermittent every 24 hours  aspirin enteric coated 81milliGRAM(s) Oral two times a day  diVALproex DR 250milliGRAM(s) Oral three times a day  escitalopram 10milliGRAM(s) Oral daily  atorvastatin 20milliGRAM(s) Oral at bedtime  sodium chloride 0.9%. 1000milliLiter(s) IV Continuous <Continuous>  enoxaparin Injectable 40milliGRAM(s) SubCutaneous daily  ALBUTerol/ipratropium for Nebulization 3milliLiter(s) Nebulizer every 8 hours  lactobacillus acidophilus 1Tablet(s) Oral two times a day with meals    MEDICATIONS  (PRN):  acetaminophen   Tablet 650milliGRAM(s) Oral every 6 hours PRN For Temp greater than 38 C (100.4 F)      Allergies    No Known Allergies    Intolerances        REVIEW OF SYSTEMS:  CONSTITUTIONAL: No fever, No chills, No fatigue, No myalgia, No Body ache  EYES: No eye pain, visual disturbances, or discharge  ENMT:  No ear pain, No nose bleed, No vertigo; No sinus or throat pain, No Congestion  NECK: No pain, No stiffness  RESPIRATORY: dry cough, no wheezing, No  hemoptysis, No shortness of breath  CARDIOVASCULAR: No chest pain, palpitations  GASTROINTESTINAL: No abdominal or epigastric pain. No nausea, No vomiting; No diarrhea or constipation. [  ] BM  GENITOURINARY: No dysuria, No frequency, No urgency, No hematuria, or incontinence  NEUROLOGICAL: No headaches, No dizziness, No numbness, No tingling, No tremors, No weakness  EXT: No Swelling, No Pain, No Edema  SKIN:  [  x] No itching, burning, rashes, or lesions   MUSCULOSKELETAL: No joint pain or swelling; No muscle pain, No back pain, No extremity pain  PSYCHIATRIC: No depression, anxiety, mood swings or difficulty sleeping at night  PAIN SCALE: [ x ] None  [  ] Other-  ROS Unable to obtain due to - [  ] Dementia  [  ] Lethargy  [  ] Sedated   REST OF REVIEW Of SYSTEM - [ x] Normal     Vital Signs Last 24 Hrs  T(C): 36.9, Max: 38.5 (- @ 21:35)  T(F): 98.5, Max: 101.3 (05-23 @ 21:35)  HR: 71 (63 - 88)  BP: 131/- (107/64 - 131/-)  BP(mean): 71 (71 - 71)  RR: 17 (16 - 19)  SpO2: 96% (96% - 97%)  Finger Stick          PHYSICAL EXAM:  GENERAL:  [ x ] NAD , [ x ] well appearing, [  ] Agitated, [  ] Lethargy, [  ] confused   HEAD:  [ x ] Normal, [  ] Other  EYES:  [ x ] EOMI, [ x] PERRLA, [  ] conjunctiva and sclera clear normal, [  ] Other,  [  ] Pallor,[  ] Discharge  ENMT:  [ x ] Normal, [ x ] Moist mucous membranes, [ x ] Good dentition, [  ] No Thrush  NECK:  [ x ] Supple, [ x ] No JVD, [ x ] Normal thyroid, [  ] Lymphadenopathy [  ] Other  NERVOUS SYSTEM:  [ x ] Alert & Oriented X3, [  ] Nonfocal   [  ] Confusion  [  ] Encephalopathic [  ] Sedated [  ] Other-  CHEST/LUNG:  [x  ] Clear to auscultation bilaterally, [ x ] No rales, [  x] No rhonchi  [x  ]  No wheezing  HEART:  [ x ] Regular rate and rhythm  [  ] irregular  [  ] No murmurs, rubs, or gallops, [  ] PPM in place (Mfr:  )  ABDOMEN:  [ x ] Soft, [ x ] Nontender, [x ] Nondistended, [x  ]No mass, [ x ] Bowel sounds present, [  ] obese  EXTREMITIES: [ x ] 2+ Peripheral Pulses, No clubbing, cyanosis,  [  ] edema, [  ] PVD stasis skin changes  LYMPH: No lymphadenopathy noted  SKIN:  [ x ] No rashes or lesions, [  ] Pressure Ulcers, [  ] echymosis, [  ] Other    DIET: DASH    LABS:                        13.0   9.5   )-----------( 170      ( 24 May 2017 06:03 )             39.4     24 May 2017 07:45    144    |  106    |  16     ----------------------------<  96     4.3     |  33     |  0.84     Ca    8.8        24 May 2017 07:45    TPro  6.5    /  Alb  3.0    /  TBili  0.4    /  DBili  x      /  AST  26     /  ALT  21     /  AlkPhos  71     23 May 2017 23:01    PT/INR - ( 23 May 2017 23:09 )   PT: 11.1 sec;   INR: 1.02 ratio         PTT - ( 23 May 2017 23:09 )  PTT:31.3 sec  Urinalysis Basic - ( 23 May 2017 23:01 )    Color: Yellow / Appearance: Clear / S.015 / pH: x  Gluc: x / Ketone: Trace  / Bili: Negative / Urobili: Negative   Blood: x / Protein: Negative / Nitrite: Negative   Leuk Esterase: Negative / RBC: 11-25 /HPF / WBC Negative   Sq Epi: x / Non Sq Epi: Negative / Bacteria: Occasional                RECENT CULTURES:        RESPIRATORY CULTURES:  human enterovirus/rhinovirus        cardiac Enzyme:        Anemia panel:          RADIOLOGY & ADDITIONAL TESTS:   CT Chest: Right upper lobe airspace consolidation with smaller areas of ground glass   and centrilobular/tree-in-bud nodular opacities, findings likely   represent pneumonia and infectious/inflammatory airway disease.  Recommend short interval follow-up chest CT examination in 10-12 weeks   following the appropriate clinical course of treatment.        HEALTH ISSUES - PROBLEM Dx:  Need for prophylactic measure: Need for prophylactic measure  MI (myocardial infarction): MI (myocardial infarction)  Bipolar disorder: Bipolar disorder  PNA (pneumonia): PNA (pneumonia)          Consultant(s) Notes Reviewed:  [  ] YES     Care Discussed with [X] Consultants  [  ] Patient  [  ] Family  [  ]   [  ] Social Service  [  ] RN, [  ] Physical Therapy  DVT PPX: [ x ] Lovenox, [  ] S C Heparin, [  ] Coumadin, [  ] Xarelto, [  ] Eliquis, [  ] SCD   Advanced directive: [  ] None, [  ] DNR/DNI Patient is a 71y old  Male who presents with a chief complaint of c/o shaking of both hand and weakness from yesterday (24 May 2017 06:35)    INTERVAL HPI: 71 year old male with PMH of MI , HLD , Bipolar disorder presents to the er for fever , chills and sob that started 8pm yesterday when patient was at his sons [employer]hars[employer] function. Patient states that he started feeling chills and had difficulty ambulating and SOB at rest. Patient has been having URI , nasal congestion and white mucus (productive cough for the past week. Patient has been taking tylenol cold with minimal relief. Denies chest pain , headaches, lightheadeness , n/v , abdominal pain, diarrhea No recent travel. No sickcontacts.     In the Ed, Vitals significant for Tmax 101.3  Labs normal  Patient has been admitted for pneumonia treatment:     Today, pt seen and assessed at bedside; pt states that he feels well, has a resolving dry cough; States that he hasn't had any fevers, chills, CP, SOB,  abdominal pain, LE edema or any other issues.      OVERNIGHT EVENTS: NONE    MEDICATIONS  (STANDING):  cefTRIAXone   IVPB 1Gram(s) IV Intermittent every 24 hours  azithromycin  IVPB 500milliGRAM(s) IV Intermittent every 24 hours  aspirin enteric coated 81milliGRAM(s) Oral two times a day  diVALproex DR 250milliGRAM(s) Oral three times a day  escitalopram 10milliGRAM(s) Oral daily  atorvastatin 20milliGRAM(s) Oral at bedtime  sodium chloride 0.9%. 1000milliLiter(s) IV Continuous <Continuous>  enoxaparin Injectable 40milliGRAM(s) SubCutaneous daily  ALBUTerol/ipratropium for Nebulization 3milliLiter(s) Nebulizer every 8 hours  lactobacillus acidophilus 1Tablet(s) Oral two times a day with meals    MEDICATIONS  (PRN):  acetaminophen   Tablet 650milliGRAM(s) Oral every 6 hours PRN For Temp greater than 38 C (100.4 F)      Allergies    No Known Allergies      REVIEW OF SYSTEMS: I feel better today  CONSTITUTIONAL: No fever, No chills, No fatigue, No myalgia, No Body ache  EYES: No eye pain, visual disturbances, or discharge  ENMT:  No ear pain, No nose bleed, No vertigo; No sinus or throat pain, No Congestion  NECK: No pain, No stiffness  RESPIRATORY: dry cough, no wheezing, No  hemoptysis, No shortness of breath  CARDIOVASCULAR: No chest pain, palpitations  GASTROINTESTINAL: No abdominal or epigastric pain. No nausea, No vomiting; No diarrhea or constipation. [  ] BM  GENITOURINARY: No dysuria, No frequency, No urgency, No hematuria, or incontinence  NEUROLOGICAL: No headaches, No dizziness, No numbness, No tingling, No tremors, No weakness  EXT: No Swelling, No Pain, No Edema  SKIN:  [  x] No itching, burning, rashes, or lesions   MUSCULOSKELETAL: No joint pain or swelling; No muscle pain, No back pain, No extremity pain  PSYCHIATRIC: No depression, anxiety, mood swings or difficulty sleeping at night  PAIN SCALE: [ x ] None  [  ] Other-  ROS Unable to obtain due to - [  ] Dementia  [  ] Lethargy  [  ] Sedated   REST OF REVIEW Of SYSTEM - [ x] Normal     Vital Signs Last 24 Hrs  T(C): 36.9, Max: 38.5 (05-23 @ 21:35)  T(F): 98.5, Max: 101.3 (05-23 @ 21:35)  HR: 71 (63 - 88)  BP: 131/- (107/64 - 131/-)  BP(mean): 71 (71 - 71)  RR: 17 (16 - 19)  SpO2: 96% (96% - 97%)  Finger Stick          PHYSICAL EXAM:  GENERAL:  [ x ] NAD , [ x ] well appearing, [  ] Agitated, [  ] Lethargy, [  ] confused   HEAD:  [ x ] Normal, [  ] Other  EYES:  [ x ] EOMI, [ x] PERRLA, [ x ] conjunctiva and sclera clear normal, [  ] Other,  [  ] Pallor,[  ] Discharge  ENMT:  [ x ] Normal, [ x ] Moist mucous membranes, [ x ] Good dentition, [  ] No Thrush  NECK:  [ x ] Supple, [ x ] No JVD, [ x ] Normal thyroid, [  ] Lymphadenopathy [  ] Other  NERVOUS SYSTEM:  [ x ] Alert & Oriented X3, [  ] Nonfocal   [  ] Confusion  [  ] Encephalopathic [  ] Sedated [  ] Other-  CHEST/LUNG:  [x  ] Clear to auscultation bilaterally, [ x ] No rales, [  x] No rhonchi  [x  ]  No wheezing  HEART:  [ x ] Regular rate and rhythm  [  ] irregular  [ x ] No murmurs, rubs, or gallops, [  ] PPM in place (Mfr:  )  ABDOMEN:  [ x ] Soft, [ x ] Nontender, [x ] Nondistended, [x  ]No mass, [ x ] Bowel sounds present, [  ] obese  EXTREMITIES: [ x ] 2+ Peripheral Pulses, No clubbing, cyanosis,  [  ] edema, [  ] PVD stasis skin changes  LYMPH: No lymphadenopathy noted  SKIN:  [ x ] No rashes or lesions, [  ] Pressure Ulcers, [  ] ecchymosis [  ] Other    DIET: DASH    LABS:                        13.0   9.5   )-----------( 170      ( 24 May 2017 06:03 )             39.4     24 May 2017 07:45    144    |  106    |  16     ----------------------------<  96     4.3     |  33     |  0.84     Ca    8.8        24 May 2017 07:45    TPro  6.5    /  Alb  3.0    /  TBili  0.4    /  DBili  x      /  AST  26     /  ALT  21     /  AlkPhos  71     23 May 2017 23:01    PT/INR - ( 23 May 2017 23:09 )   PT: 11.1 sec;   INR: 1.02 ratio         PTT - ( 23 May 2017 23:09 )  PTT:31.3 sec  Urinalysis Basic - ( 23 May 2017 23:01 )    Color: Yellow / Appearance: Clear / S.015 / pH: x  Gluc: x / Ketone: Trace  / Bili: Negative / Urobili: Negative   Blood: x / Protein: Negative / Nitrite: Negative   Leuk Esterase: Negative / RBC: 11-25 /HPF / WBC Negative   Sq Epi: x / Non Sq Epi: Negative / Bacteria: Occasional    RECENT CULTURES:NONE        RESPIRATORY CULTURES:  human enterovirus/rhinovirus +      RADIOLOGY & ADDITIONAL TESTS:   CT Chest: Right upper lobe airspace consolidation with smaller areas of ground glass   and centrilobular/tree-in-bud nodular opacities, findings likely   represent pneumonia and infectious/inflammatory airway disease.  Recommend short interval follow-up chest CT examination in 10-12 weeks   following the appropriate clinical course of treatment.        HEALTH ISSUES - PROBLEM Dx:  Need for prophylactic measure: Need for prophylactic measure  MI (myocardial infarction): MI (myocardial infarction)  Bipolar disorder: Bipolar disorder  PNA (pneumonia): PNA (pneumonia)          Consultant(s) Notes Reviewed:  [x  ] YES     Care Discussed with [X] Consultants  [ x ] Patient  [ x ] Family  [  ]   [  ] Social Service  [x  ] RN, [  ] Physical Therapy  DVT PPX: [ x ] Lovenox, [  ] S C Heparin, [  ] Coumadin, [  ] Xarelto, [  ] Eliquis, [  ] SCD   Advanced directive: [x  ] None, [  ] DNR/DNI

## 2017-05-25 LAB
ANION GAP SERPL CALC-SCNC: 9 MMOL/L — SIGNIFICANT CHANGE UP (ref 5–17)
BASOPHILS # BLD AUTO: 0 K/UL — SIGNIFICANT CHANGE UP (ref 0–0.2)
BASOPHILS NFR BLD AUTO: 0.3 % — SIGNIFICANT CHANGE UP (ref 0–2)
BUN SERPL-MCNC: 12 MG/DL — SIGNIFICANT CHANGE UP (ref 7–23)
CALCIUM SERPL-MCNC: 9 MG/DL — SIGNIFICANT CHANGE UP (ref 8.5–10.1)
CHLORIDE SERPL-SCNC: 102 MMOL/L — SIGNIFICANT CHANGE UP (ref 96–108)
CO2 SERPL-SCNC: 31 MMOL/L — SIGNIFICANT CHANGE UP (ref 22–31)
CREAT SERPL-MCNC: 0.78 MG/DL — SIGNIFICANT CHANGE UP (ref 0.5–1.3)
CULTURE RESULTS: NO GROWTH — SIGNIFICANT CHANGE UP
EOSINOPHIL # BLD AUTO: 0 K/UL — SIGNIFICANT CHANGE UP (ref 0–0.5)
EOSINOPHIL NFR BLD AUTO: 0.1 % — SIGNIFICANT CHANGE UP (ref 0–6)
GLUCOSE SERPL-MCNC: 108 MG/DL — HIGH (ref 70–99)
HCT VFR BLD CALC: 45.3 % — SIGNIFICANT CHANGE UP (ref 39–50)
HGB BLD-MCNC: 14.5 G/DL — SIGNIFICANT CHANGE UP (ref 13–17)
LEGIONELLA AG UR QL: NEGATIVE — SIGNIFICANT CHANGE UP
LYMPHOCYTES # BLD AUTO: 1 K/UL — SIGNIFICANT CHANGE UP (ref 1–3.3)
LYMPHOCYTES # BLD AUTO: 10.4 % — LOW (ref 13–44)
MCHC RBC-ENTMCNC: 30.8 PG — SIGNIFICANT CHANGE UP (ref 27–34)
MCHC RBC-ENTMCNC: 32 GM/DL — SIGNIFICANT CHANGE UP (ref 32–36)
MCV RBC AUTO: 96.1 FL — SIGNIFICANT CHANGE UP (ref 80–100)
MONOCYTES # BLD AUTO: 0.8 K/UL — SIGNIFICANT CHANGE UP (ref 0–0.9)
MONOCYTES NFR BLD AUTO: 8.4 % — SIGNIFICANT CHANGE UP (ref 1–9)
NEUTROPHILS # BLD AUTO: 7.6 K/UL — HIGH (ref 1.8–7.4)
NEUTROPHILS NFR BLD AUTO: 80.8 % — HIGH (ref 43–77)
PLATELET # BLD AUTO: 166 K/UL — SIGNIFICANT CHANGE UP (ref 150–400)
POTASSIUM SERPL-MCNC: 3.6 MMOL/L — SIGNIFICANT CHANGE UP (ref 3.5–5.3)
POTASSIUM SERPL-SCNC: 3.6 MMOL/L — SIGNIFICANT CHANGE UP (ref 3.5–5.3)
RBC # BLD: 4.71 M/UL — SIGNIFICANT CHANGE UP (ref 4.2–5.8)
RBC # FLD: 12.3 % — SIGNIFICANT CHANGE UP (ref 10.3–14.5)
SODIUM SERPL-SCNC: 142 MMOL/L — SIGNIFICANT CHANGE UP (ref 135–145)
SPECIMEN SOURCE: SIGNIFICANT CHANGE UP
WBC # BLD: 9.5 K/UL — SIGNIFICANT CHANGE UP (ref 3.8–10.5)
WBC # FLD AUTO: 9.5 K/UL — SIGNIFICANT CHANGE UP (ref 3.8–10.5)

## 2017-05-25 RX ORDER — DIVALPROEX SODIUM 500 MG/1
500 TABLET, DELAYED RELEASE ORAL
Qty: 0 | Refills: 0 | Status: DISCONTINUED | OUTPATIENT
Start: 2017-05-25 | End: 2017-05-26

## 2017-05-25 RX ORDER — AZITHROMYCIN 500 MG/1
250 TABLET, FILM COATED ORAL DAILY
Qty: 0 | Refills: 0 | Status: DISCONTINUED | OUTPATIENT
Start: 2017-05-25 | End: 2017-05-26

## 2017-05-25 RX ORDER — DOCUSATE SODIUM 100 MG
100 CAPSULE ORAL DAILY
Qty: 0 | Refills: 0 | Status: DISCONTINUED | OUTPATIENT
Start: 2017-05-25 | End: 2017-05-26

## 2017-05-25 RX ORDER — CEFUROXIME AXETIL 250 MG
500 TABLET ORAL EVERY 12 HOURS
Qty: 0 | Refills: 0 | Status: DISCONTINUED | OUTPATIENT
Start: 2017-05-26 | End: 2017-05-26

## 2017-05-25 RX ORDER — SENNA PLUS 8.6 MG/1
2 TABLET ORAL AT BEDTIME
Qty: 0 | Refills: 0 | Status: DISCONTINUED | OUTPATIENT
Start: 2017-05-25 | End: 2017-05-26

## 2017-05-25 RX ADMIN — Medication 3 MILLILITER(S): at 23:11

## 2017-05-25 RX ADMIN — ESCITALOPRAM OXALATE 10 MILLIGRAM(S): 10 TABLET, FILM COATED ORAL at 11:28

## 2017-05-25 RX ADMIN — Medication 1 TABLET(S): at 11:28

## 2017-05-25 RX ADMIN — CEFTRIAXONE 100 GRAM(S): 500 INJECTION, POWDER, FOR SOLUTION INTRAMUSCULAR; INTRAVENOUS at 22:01

## 2017-05-25 RX ADMIN — Medication 1 TABLET(S): at 17:16

## 2017-05-25 RX ADMIN — Medication 100 MILLIGRAM(S): at 21:59

## 2017-05-25 RX ADMIN — SENNA PLUS 2 TABLET(S): 8.6 TABLET ORAL at 21:58

## 2017-05-25 RX ADMIN — DIVALPROEX SODIUM 250 MILLIGRAM(S): 500 TABLET, DELAYED RELEASE ORAL at 13:13

## 2017-05-25 RX ADMIN — ATORVASTATIN CALCIUM 20 MILLIGRAM(S): 80 TABLET, FILM COATED ORAL at 21:58

## 2017-05-25 RX ADMIN — DIVALPROEX SODIUM 500 MILLIGRAM(S): 500 TABLET, DELAYED RELEASE ORAL at 17:16

## 2017-05-25 RX ADMIN — Medication 3 MILLILITER(S): at 08:13

## 2017-05-25 RX ADMIN — DIVALPROEX SODIUM 250 MILLIGRAM(S): 500 TABLET, DELAYED RELEASE ORAL at 06:02

## 2017-05-25 RX ADMIN — Medication 81 MILLIGRAM(S): at 17:16

## 2017-05-25 RX ADMIN — Medication 3 MILLILITER(S): at 14:44

## 2017-05-25 RX ADMIN — Medication 81 MILLIGRAM(S): at 06:02

## 2017-05-25 RX ADMIN — AZITHROMYCIN 250 MILLIGRAM(S): 500 TABLET, FILM COATED ORAL at 13:13

## 2017-05-25 RX ADMIN — ENOXAPARIN SODIUM 40 MILLIGRAM(S): 100 INJECTION SUBCUTANEOUS at 11:28

## 2017-05-25 NOTE — PROGRESS NOTE ADULT - SUBJECTIVE AND OBJECTIVE BOX
Patient is a 71y old  Male who presents with a chief complaint of c/o shaking of both hand and weakness from yesterday     INTERVAL HPI:  71 year old male with PMH of MI , HLD , Bipolar disorder presents to the er for fever , chills and sob that started 8pm on  when patient was at his sons rehearsal band function. Patient states that he started feeling chills and had difficulty ambulating and SOB at rest. Patient has been having URI , nasal congestion and white mucus (productive cough for the past week. Patient has been taking tylenol cold with minimal relief.     Pt being treated for PUL pneumonia (CT findings showed: Right upper lobe airspace consolidation with smaller areas of ground glass and centrilobular/tree-in-bud nodular opacities, findings likely represent pneumonia and infectious/inflammatory airway disease), pt also tested positive for Enterovirus/ Rhinovirus.      Today, pt seen and assessed at bedside; pt states that he feels:   Pt denied any: fevers, chills, CP, SOB,  abdominal pain, LE edema or any other issues.      OVERNIGHT EVENTS: NONE     MEDICATIONS  (STANDING):  cefTRIAXone   IVPB 1Gram(s) IV Intermittent every 24 hours  azithromycin  IVPB 500milliGRAM(s) IV Intermittent every 24 hours  aspirin enteric coated 81milliGRAM(s) Oral two times a day  diVALproex DR 250milliGRAM(s) Oral three times a day  escitalopram 10milliGRAM(s) Oral daily  atorvastatin 20milliGRAM(s) Oral at bedtime  enoxaparin Injectable 40milliGRAM(s) SubCutaneous daily  ALBUTerol/ipratropium for Nebulization 3milliLiter(s) Nebulizer every 8 hours  lactobacillus acidophilus 1Tablet(s) Oral two times a day with meals    MEDICATIONS  (PRN):  acetaminophen   Tablet 650milliGRAM(s) Oral every 6 hours PRN For Temp greater than 38 C (100.4 F)      Allergies    No Known Allergies    Intolerances        REVIEW OF SYSTEMS:  CONSTITUTIONAL: No fever, No chills, No fatigue, No myalgia, No Body ache  EYES: No eye pain, visual disturbances, or discharge  ENMT:  No ear pain, No nose bleed, No vertigo; No sinus or throat pain, No Congestion  NECK: No pain, No stiffness  RESPIRATORY: No cough, wheezing, No  hemoptysis, No shortness of breath  CARDIOVASCULAR: No chest pain, palpitations  GASTROINTESTINAL: No abdominal or epigastric pain. No nausea, No vomiting; No diarrhea or constipation. [  ] BM  GENITOURINARY: No dysuria, No frequency, No urgency, No hematuria, or incontinence  NEUROLOGICAL: No headaches, No dizziness, No numbness, No tingling, No tremors, No weakness  EXT: No Swelling, No Pain, No Edema  SKIN:  [  ] No itching, burning, rashes, or lesions   MUSCULOSKELETAL: No joint pain or swelling; No muscle pain, No back pain, No extremity pain  PSYCHIATRIC: No depression, anxiety, mood swings or difficulty sleeping at night  PAIN SCALE: [  ] None  [  ] Other-  ROS Unable to obtain due to - [  ] Dementia  [  ] Lethargy  [  ] Sedated   REST OF REVIEW Of SYSTEM - [  ] Normal     Vital Signs Last 24 Hrs  T(C): 37.1, Max: 37.1 ( @ 04:55)  T(F): 98.8, Max: 98.8 ( @ 04:55)  HR: 71 (68 - 77)  BP: 148/72 (120/63 - 148/72)  BP(mean): --  RR: 17 (16 - 17)  SpO2: 97% (91% - 98%)  Finger Stick        I & Os for current day (as of  @ 08:07)  =============================================  IN: 1430 ml / OUT: 350 ml / NET: 1080 ml      PHYSICAL EXAM:  GENERAL:  [  ] NAD , [  ] well appearing, [  ] Agitated, [  ] Lethargy, [  ] confused   HEAD:  [  ] Normal, [  ] Other  EYES:  [  ] EOMI, [  ] PERRLA, [  ] conjunctiva and sclera clear normal, [  ] Other,  [  ] Pallor,[  ] Discharge  ENMT:  [  ] Normal, [  ] Moist mucous membranes, [  ] Good dentition, [  ] No Thrush  NECK:  [  ] Supple, [  ] No JVD, [  ] Normal thyroid, [  ] Lymphadenopathy [  ] Other  NERVOUS SYSTEM:  [  ] Alert & Oriented X3, [  ] Nonfocal   [  ] Confusion  [  ] Encephalopathic [  ] Sedated [  ] Other-  CHEST/LUNG:  [  ] Clear to auscultation bilaterally, [  ] No rales, [  ] No rhonchi  [  ]  No wheezing  HEART:  [  ] Regular rate and rhythm  [  ] irregular  [  ] No murmurs, rubs, or gallops, [  ] PPM in place (Mfr:  )  ABDOMEN:  [  ] Soft, [  ] Nontender, [  ] Nondistended, [  ]No mass, [  ] Bowel sounds present, [  ] obese  EXTREMITIES: [  ] 2+ Peripheral Pulses, No clubbing, cyanosis,  [  ] edema, [  ] PVD stasis skin changes  LYMPH: No lymphadenopathy noted  SKIN:  [  ] No rashes or lesions, [  ] Pressure Ulcers, [  ] echymosis, [  ] Other    DIET:     LABS:                        14.5   9.5   )-----------( 166      ( 25 May 2017 06:52 )             45.3     25 May 2017 06:52    142    |  102    |  12     ----------------------------<  108    3.6     |  31     |  0.78     Ca    9.0        25 May 2017 06:52      PT/INR - ( 23 May 2017 23:09 )   PT: 11.1 sec;   INR: 1.02 ratio         PTT - ( 23 May 2017 23:09 )  PTT:31.3 sec  Urinalysis Basic - ( 23 May 2017 23:01 )    Color: Yellow / Appearance: Clear / S.015 / pH: x  Gluc: x / Ketone: Trace  / Bili: Negative / Urobili: Negative   Blood: x / Protein: Negative / Nitrite: Negative   Leuk Esterase: Negative / RBC: 11-25 /HPF / WBC Negative   Sq Epi: x / Non Sq Epi: Negative / Bacteria: Occasional                RECENT CULTURES:        RESPIRATORY CULTURES:      cardiac Enzyme:        Anemia panel:          RADIOLOGY & ADDITIONAL TESTS:    CT Chest:   Right upper lobe airspace consolidation with smaller areas of groundglass   and centrilobular/tree-in-bud nodular opacities, findings likely   represent pneumonia and infectious/inflammatory airway disease.  Recommend short interval follow-up chest CT examination in 10-12 weeks   following the appropriate clinical course of treatment.      HEALTH ISSUES - PROBLEM Dx:  HLD (hyperlipidemia): HLD (hyperlipidemia)  Need for prophylactic measure: Need for prophylactic measure  MI (myocardial infarction): MI (myocardial infarction)  Bipolar disorder: Bipolar disorder  PNA (pneumonia): PNA (pneumonia)          Consultant(s) Notes Reviewed:  [  ] YES     Care Discussed with [X] Consultants  [  ] Patient  [  ] Family  [  ]   [  ] Social Service  [  ] RN, [  ] Physical Therapy  DVT PPX: [  ] Lovenox, [  ] S C Heparin, [  ] Coumadin, [  ] Xarelto, [  ] Eliquis, [  ] SCD   Advanced directive: [  ] None, [  ] DNR/DNI Patient is a 71y old  Male who presents with a chief complaint of c/o shaking of both hand and weakness from yesterday     INTERVAL HPI:  71 year old male with PMH of MI , HLD , Bipolar disorder presents to the er for fever , chills and sob that started 8pm on  when patient was at his sons rehearsal band function. Patient states that he started feeling chills and had difficulty ambulating and SOB at rest. Patient has been having URI , nasal congestion and white mucus (productive cough for the past week. Patient has been taking tylenol cold with minimal relief.     Pt being treated for PUL pneumonia (CT findings showed: Right upper lobe airspace consolidation with smaller areas of ground glass and centrilobular/tree-in-bud nodular opacities, findings likely represent pneumonia and infectious/inflammatory airway disease), pt also tested positive for Enterovirus/ Rhinovirus.      Today, pt seen and assessed at bedside; pt states that he feels well; pt states that his cough is resolving, had some chills last night--but less severe than when he was admitted; overall pt states that he feels very well.   Pt denied any: fevers,  CP, palpitations, SOB,  abdominal pain, N/V/D/C, LE edema or any other issues.      OVERNIGHT EVENTS: NONE     MEDICATIONS  (STANDING):  cefTRIAXone   IVPB 1Gram(s) IV Intermittent every 24 hours  azithromycin  IVPB 500milliGRAM(s) IV Intermittent every 24 hours  aspirin enteric coated 81milliGRAM(s) Oral two times a day  diVALproex DR 250milliGRAM(s) Oral three times a day  escitalopram 10milliGRAM(s) Oral daily  atorvastatin 20milliGRAM(s) Oral at bedtime  enoxaparin Injectable 40milliGRAM(s) SubCutaneous daily  ALBUTerol/ipratropium for Nebulization 3milliLiter(s) Nebulizer every 8 hours  lactobacillus acidophilus 1Tablet(s) Oral two times a day with meals    MEDICATIONS  (PRN):  acetaminophen   Tablet 650milliGRAM(s) Oral every 6 hours PRN For Temp greater than 38 C (100.4 F)      Allergies    No Known Allergies    Intolerances        REVIEW OF SYSTEMS:  CONSTITUTIONAL: No fever, No chills, No fatigue, No myalgia, No Body ache  EYES: No eye pain, visual disturbances, or discharge  ENMT:  No ear pain, No nose bleed, No vertigo; No sinus or throat pain, No Congestion  NECK: No pain, No stiffness  RESPIRATORY: No cough, wheezing, No  hemoptysis, No shortness of breath  CARDIOVASCULAR: No chest pain, palpitations  GASTROINTESTINAL: No abdominal or epigastric pain. No nausea, No vomiting; No diarrhea or constipation. [  ] BM  GENITOURINARY: No dysuria, No frequency, No urgency, No hematuria, or incontinence  NEUROLOGICAL: No headaches, No dizziness, No numbness, No tingling, No tremors, No weakness  EXT: No Swelling, No Pain, No Edema  SKIN:  [  ] No itching, burning, rashes, or lesions   MUSCULOSKELETAL: No joint pain or swelling; No muscle pain, No back pain, No extremity pain  PSYCHIATRIC: No depression, anxiety, mood swings or difficulty sleeping at night  PAIN SCALE: [  ] None  [  ] Other-  ROS Unable to obtain due to - [  ] Dementia  [  ] Lethargy  [  ] Sedated   REST OF REVIEW Of SYSTEM - [  ] Normal     Vital Signs Last 24 Hrs  T(C): 37.1, Max: 37.1 ( @ 04:55)  T(F): 98.8, Max: 98.8 ( @ 04:55)  HR: 71 (68 - 77)  BP: 148/72 (120/63 - 148/72)  BP(mean): --  RR: 17 (16 - 17)  SpO2: 97% (91% - 98%)  Finger Stick        I & Os for current day (as of  @ 08:07)  =============================================  IN: 1430 ml / OUT: 350 ml / NET: 1080 ml      PHYSICAL EXAM:  GENERAL:  [x ] NAD , [ x ] well appearing, [  ] Agitated, [  ] Lethargy, [  ] confused   HEAD:  [ x ] Normal, [  ] Other  EYES:  [ x ] EOMI, [x] PERRLA, [ x ] conjunctiva and sclera clear normal, [  ] Other,  [  ] Pallor,[  ] Discharge  ENMT:  [ x ] Normal, [x ] Moist mucous membranes, [x  ] Good dentition, [  ] No Thrush  NECK:  [ x ] Supple, [ x ] No JVD, [ x ] Normal thyroid, [  ] Lymphadenopathy [  ] Other  NERVOUS SYSTEM:  [x  ] Alert & Oriented X3, [  ] Nonfocal   [  ] Confusion  [  ] Encephalopathic [  ] Sedated [  ] Other-  CHEST/LUNG:  [x  ] Clear to auscultation bilaterally, [ x No rales, [ x ] No rhonchi  [ x ]  No wheezing  HEART:  [ x Regular rate and rhythm  [  ] irregular  [  ] No murmurs, rubs, or gallops, [  ] PPM in place (Mfr:  )  ABDOMEN:  [ x ] Soft, [x  ] Nontender, [ x ] Nondistended, [ x ]No mass, [ x ] Bowel sounds present, [  ] obese  EXTREMITIES: [ x ] 2+ Peripheral Pulses, No clubbing, cyanosis,  [  ] edema, [  ] PVD stasis skin changes  LYMPH: No lymphadenopathy noted  SKIN:  [x  ] No rashes or lesions, [  ] Pressure Ulcers, [  ] echymosis, [  ] Other    DIET: DASH diet     LABS:                        14.5   9.5   )-----------( 166      ( 25 May 2017 06:52 )             45.3     25 May 2017 06:52    142    |  102    |  12     ----------------------------<  108    3.6     |  31     |  0.78     Ca    9.0        25 May 2017 06:52      PT/INR - ( 23 May 2017 23:09 )   PT: 11.1 sec;   INR: 1.02 ratio         PTT - ( 23 May 2017 23:09 )  PTT:31.3 sec  Urinalysis Basic - ( 23 May 2017 23:01 )    Color: Yellow / Appearance: Clear / S.015 / pH: x  Gluc: x / Ketone: Trace  / Bili: Negative / Urobili: Negative   Blood: x / Protein: Negative / Nitrite: Negative   Leuk Esterase: Negative / RBC: 11-25 /HPF / WBC Negative   Sq Epi: x / Non Sq Epi: Negative / Bacteria: Occasional                RECENT CULTURES:        RESPIRATORY CULTURES:      cardiac Enzyme:        Anemia panel:          RADIOLOGY & ADDITIONAL TESTS:    CT Chest:   Right upper lobe airspace consolidation with smaller areas of groundglass   and centrilobular/tree-in-bud nodular opacities, findings likely   represent pneumonia and infectious/inflammatory airway disease.  Recommend short interval follow-up chest CT examination in 10-12 weeks   following the appropriate clinical course of treatment.      HEALTH ISSUES - PROBLEM Dx:  HLD (hyperlipidemia): HLD (hyperlipidemia)  Need for prophylactic measure: Need for prophylactic measure  MI (myocardial infarction): MI (myocardial infarction)  Bipolar disorder: Bipolar disorder  PNA (pneumonia): PNA (pneumonia)          Consultant(s) Notes Reviewed:  [x ] YES     Care Discussed with [X] Consultants  [  ] Patient  [  ] Family  [  ]   [  ] Social Service  [  ] RN, [  ] Physical Therapy  DVT PPX: [ x ] Lovenox, [  ] S C Heparin, [  ] Coumadin, [  ] Xarelto, [  ] Eliquis, [  ] SCD   Advanced directive: [  ] None, [  ] DNR/DNI Patient is a 71y old  Male who presents with a chief complaint of c/o shaking of both hand and weakness from yesterday     INTERVAL HPI:  71 year old male with PMH of MI , HLD , Bipolar disorder presents to the er for fever , chills and sob that started 8pm on  when patient was at his sons rehearsal band function. Patient states that he started feeling chills and had difficulty ambulating and SOB at rest. Patient has been having URI , nasal congestion and white mucus (productive cough for the past week. Patient has been taking tylenol cold with minimal relief.     Pt being treated for PUL pneumonia (CT findings showed: Right upper lobe airspace consolidation with smaller areas of ground glass and centrilobular/tree-in-bud nodular opacities, findings likely represent pneumonia and infectious/inflammatory airway disease), pt also tested positive for Enterovirus/ Rhinovirus.      Today, pt seen and assessed at bedside; pt states that he feels well; pt states that his cough is resolving, had some chills last night--but less severe than when he was admitted; overall pt states that he feels very well.   Pt denied any: fevers,  CP, palpitations, SOB,  abdominal pain, N/V/D/C, LE edema or any other issues.      OVERNIGHT EVENTS: NONE     MEDICATIONS  (STANDING):  cefTRIAXone   IVPB 1Gram(s) IV Intermittent every 24 hours  azithromycin  IVPB 500milliGRAM(s) IV Intermittent every 24 hours  aspirin enteric coated 81milliGRAM(s) Oral two times a day  diVALproex DR 250milliGRAM(s) Oral three times a day  escitalopram 10milliGRAM(s) Oral daily  atorvastatin 20milliGRAM(s) Oral at bedtime  enoxaparin Injectable 40milliGRAM(s) SubCutaneous daily  ALBUTerol/ipratropium for Nebulization 3milliLiter(s) Nebulizer every 8 hours  lactobacillus acidophilus 1Tablet(s) Oral two times a day with meals    MEDICATIONS  (PRN):  acetaminophen   Tablet 650milliGRAM(s) Oral every 6 hours PRN For Temp greater than 38 C (100.4 F)      Allergies    No Known Allergies      REVIEW OF SYSTEMS:  CONSTITUTIONAL: No fever, No chills, No fatigue, No myalgia, No Body ache  EYES: No eye pain, visual disturbances, or discharge  ENMT:  No ear pain, No nose bleed, No vertigo; No sinus or throat pain, No Congestion  NECK: No pain, No stiffness  RESPIRATORY: ++ cough, No wheezing, No  hemoptysis, No shortness of breath  CARDIOVASCULAR: No chest pain, palpitations  GASTROINTESTINAL: No abdominal or epigastric pain. No nausea, No vomiting; No diarrhea or constipation. [  ] BM  GENITOURINARY: No dysuria, No frequency, No urgency, No hematuria, or incontinence  NEUROLOGICAL: No headaches, No dizziness, No numbness, No tingling, No tremors, No weakness  EXT: No Swelling, No Pain, No Edema  SKIN:  [x  ] No itching, burning, rashes, or lesions   MUSCULOSKELETAL: No joint pain or swelling; No muscle pain, No back pain, No extremity pain  PSYCHIATRIC: No depression, anxiety, mood swings or difficulty sleeping at night  PAIN SCALE: [ x ] None  [  ] Other-  ROS Unable to obtain due to - [  ] Dementia  [  ] Lethargy  [  ] Sedated   REST OF REVIEW Of SYSTEM - [ x ] Normal     Vital Signs Last 24 Hrs  T(C): 37.1, Max: 37.1 ( @ 04:55)  T(F): 98.8, Max: 98.8 ( @ 04:55)  HR: 71 (68 - 77)  BP: 148/72 (120/63 - 148/72)  BP(mean): --  RR: 17 (16 - 17)  SpO2: 97% (91% - 98%)  Finger Stick        I & Os for current day (as of  @ 08:07)  =============================================  IN: 1430 ml / OUT: 350 ml / NET: 1080 ml      PHYSICAL EXAM:  GENERAL:  [x ] NAD , [ x ] well appearing, [  ] Agitated, [  ] Lethargy, [  ] confused   HEAD:  [ x ] Normal, [  ] Other  EYES:  [ x ] EOMI, [x] PERRLA, [ x ] conjunctiva and sclera clear normal, [  ] Other,  [  ] Pallor,[  ] Discharge  ENMT:  [ x ] Normal, [x ] Moist mucous membranes, [x  ] Good dentition, [  ] No Thrush  NECK:  [ x ] Supple, [ x ] No JVD, [ x ] Normal thyroid, [  ] Lymphadenopathy [  ] Other  NERVOUS SYSTEM:  [x  ] Alert & Oriented X3, [  ] Nonfocal   [  ] Confusion  [  ] Encephalopathic [  ] Sedated [  ] Other-  CHEST/LUNG:  [x  ] Clear to auscultation bilaterally, [ x] No rales, [ x ] few rhonchi Rt lung [ x ]  No wheezing  HEART:  [ x Regular rate and rhythm  [  ] irregular  [  ] No murmurs, rubs, or gallops, [  ] PPM in place (Mfr:  )  ABDOMEN:  [ x ] Soft, [x  ] Nontender, [ x ] Nondistended, [ x ]No mass, [ x ] Bowel sounds present, [  ] obese  EXTREMITIES: [ x ] 2+ Peripheral Pulses, No clubbing, cyanosis,  [  ] edema, [  ] PVD stasis skin changes  LYMPH: No lymphadenopathy noted  SKIN:  [x  ] No rashes or lesions, [  ] Pressure Ulcers, [  ] echymosis, [  ] Other    DIET: DASH diet     LABS:                        14.5   9.5   )-----------( 166      ( 25 May 2017 06:52 )             45.3     25 May 2017 06:52    142    |  102    |  12     ----------------------------<  108    3.6     |  31     |  0.78     Ca    9.0        25 May 2017 06:52      PT/INR - ( 23 May 2017 23:09 )   PT: 11.1 sec;   INR: 1.02 ratio         PTT - ( 23 May 2017 23:09 )  PTT:31.3 sec  Urinalysis Basic - ( 23 May 2017 23:01 )    Color: Yellow / Appearance: Clear / S.015 / pH: x  Gluc: x / Ketone: Trace  / Bili: Negative / Urobili: Negative   Blood: x / Protein: Negative / Nitrite: Negative   Leuk Esterase: Negative / RBC: 11-25 /HPF / WBC Negative   Sq Epi: x / Non Sq Epi: Negative / Bacteria: Occasional            RADIOLOGY & ADDITIONAL TESTS:    CT Chest:   Right upper lobe airspace consolidation with smaller areas of groundglass   and centrilobular/tree-in-bud nodular opacities, findings likely   represent pneumonia and infectious/inflammatory airway disease.  Recommend short interval follow-up chest CT examination in 10-12 weeks   following the appropriate clinical course of treatment.      HEALTH ISSUES - PROBLEM Dx:  HLD (hyperlipidemia): HLD (hyperlipidemia)  Need for prophylactic measure: Need for prophylactic measure  MI (myocardial infarction): MI (myocardial infarction)  Bipolar disorder: Bipolar disorder  PNA (pneumonia): PNA (pneumonia)          Consultant(s) Notes Reviewed:  [x ] YES     Care Discussed with [X] Consultants  [  ] Patient  [  ] Family  [  ]   [  ] Social Service  [  ] RN, [  ] Physical Therapy  DVT PPX: [ x ] Lovenox, [  ] S C Heparin, [  ] Coumadin, [  ] Xarelto, [  ] Eliquis, [  ] SCD   Advanced directive: [  ] None, [  ] DNR/DNI

## 2017-05-25 NOTE — PROGRESS NOTE ADULT - ASSESSMENT
71 year old male with PMH of MI , HLD , Bipolar disorder admitted for CAP:RT LL with Ac febrile Viral illness.

## 2017-05-25 NOTE — PROGRESS NOTE ADULT - PROBLEM SELECTOR PLAN 1
-Likely CAP (atypical vs viral, as per ID); CT chest showed: RUL consolidation/ ground glass opacities  -RVP tested positive for Entervirus/ Rhinovirus as well   -Urine legionella test result pending; f/u ID reccs :Dr CHAPITO Lopez  -send urine for legionella antigen, Bloos C/S  - follow c s, if negative probably change to po antibiotics in 24-48 hours    - repeat CXR in 1 week   - OOB and ambulate  -Continue: IV Azithro, Rocephin; daily, Duoneb treatments   -Tylenol for fever PRN  -f/u AM labs (WBC today 9.5) , Mildly elevated Pro Calcitonin  -Blood cultures sent--f/u results   -f/u ID Dr. KWAN Lopez reccs -Likely CAP (atypical vs viral, as per ID); CT chest showed: RUL consolidation/ ground glass opacities  -RVP tested positive for Entervirus/ Rhinovirus as well   -pt continued on IV Zithro and IV Rocephin, as well as started on PO Ceftin today by Dr. KWAN Lopez ID   -Urine legionella test result pending; f/u ID reccs :Dr CHAPITO Lopez  - follow c s, if negative probably change to po antibiotics in 24-48 hours    - repeat CXR in 1 week   - OOB and ambulate  -Continue: IV Azithro, Rocephin; daily, Duoneb treatments   -Tylenol for fever PRN  -f/u AM labs (WBC today 9.5) , Mildly elevated Pro Calcitonin  -Blood cultures sent--f/u results   -f/u ID Dr. KWAN Lopez reccs -Likely CAP (atypical vs viral, as per ID); CT chest showed: RUL consolidation/ ground glass opacities  -RVP tested positive for Entervirus/ Rhinovirus as well   -pt continued on IV Zithro and IV Rocephin, as well as started on PO Ceftin today by Dr. KWAN Lopez ID   -Urine legionella test result pending; f/u ID reccs :Dr CHAPITO Lopez  - follow c s, if negative probably change to po antibiotics in 24-48 hours    - repeat CXR in 1 week   - OOB and ambulate  -Continue: daily, Duoneb treatments   -Tylenol for fever PRN  -f/u AM labs (WBC today 9.5) , Mildly elevated Pro Calcitonin  -Blood cultures sent--f/u results   -f/u ID Dr. KWAN Lopez reccs -Likely CAP (atypical vs viral, as per ID); CT chest showed: RUL consolidation/ ground glass opacities  -RVP tested positive for Entervirus/ Rhinovirus as well   -pt continued on IV Zithro and IV Rocephin, as well as started on PO Ceftin in AM by Dr. KWAN Lopez ID   -Urine legionella test result pending; f/u ID reccs :Dr CHAPITO Lopez  - follow c s, if negative probably change to po antibiotics in 24-48 hours    - repeat CXR in 1 week   - OOB and ambulate  -Continue: daily, Duoneb treatments   -Tylenol for fever PRN  -f/u AM labs (WBC today 9.5) , Mildly elevated Pro Calcitonin  -Blood cultures sent--f/u results   -f/u ID Dr. KWAN Lopez reccs

## 2017-05-25 NOTE — PROGRESS NOTE ADULT - SUBJECTIVE AND OBJECTIVE BOX
RYLAND ASHLEY is a 71yMale , patient examined and chart reviewed. Patient seen and examined today being followed for possible pneumonia with Rhinovirus infection       INTERVAL HPI/ OVERNIGHT EVENTS: Feels overall better remains congested , appetite is better. ambulating to bathroom     PAST MEDICAL & SURGICAL HISTORY:  MI (myocardial infarction)  H/O gastric ulcer: back in   Bipolar disorder  History of gastrectomy: in , for gastric ulcer      For details regarding the patient's social history, family history, and other miscellaneous elements, please refer the initial infectious diseases consultation and/or the admitting history and physical examination for this admission.    ROS:  CONSTITUTIONAL:  Negative fever or chills, feels well, good appetite  EYES:  Negative  blurry vision or double vision  CARDIOVASCULAR:  Negative for chest pain or palpitations  RESPIRATORY:  Positive for cough, NO wheezing, or SOB   GASTROINTESTINAL:  Negative for nausea, vomiting, diarrhea, constipation, or abdominal pain  GENITOURINARY:  Negative frequency, urgency or dysuria  NEUROLOGIC:  No headache, confusion, dizziness, lightheadedness  All other systems were reviewed and are negative         Current inpatient medications :    ANTIBIOTICS/RELEVANT:  cefTRIAXone   IVPB 1Gram(s) IV Intermittent every 24 hours  azithromycin  IVPB 500milliGRAM(s) IV Intermittent every 24 hours  lactobacillus acidophilus 1Tablet(s) Oral two times a day with meals      acetaminophen   Tablet 650milliGRAM(s) Oral every 6 hours PRN  aspirin enteric coated 81milliGRAM(s) Oral two times a day  diVALproex DR 250milliGRAM(s) Oral three times a day  escitalopram 10milliGRAM(s) Oral daily  atorvastatin 20milliGRAM(s) Oral at bedtime  enoxaparin Injectable 40milliGRAM(s) SubCutaneous daily  ALBUTerol/ipratropium for Nebulization 3milliLiter(s) Nebulizer every 8 hours      Objective:    I & Os for current day (as of  @ 10:21)  =============================================  IN: 1430 ml / OUT: 350 ml / NET: 1080 ml    T(C): 37.1, Max: 37.1 ( @ 04:55)  HR: 77 (71 - 77)  BP: 148/72 (120/63 - 148/72)  RR: 17 (16 - 17)  SpO2: 92% (91% - 98%)  Wt(kg): --      Physical Exam:  GEN: NAD, pleasant  HEENT: normocephalic and atraumatic.  EOMI. GILSON. Moist mucosa. Clear Posterior pharynx.  NECK: Supple. No carotid bruits.  No lymphadenopathy or thyromegaly.  LUNGS: Clear to auscultation.  HEART: Regular rate and rhythm without murmur.  ABDOMEN: Soft, nontender, and nondistended.  Positive bowel sounds.  No hepatosplenomegaly was noted.  EXTREMITIES: Without any cyanosis, clubbing, rash, lesions or edema. 2 + peripheral pulses   NEUROLOGIC: Alert & oriented x 3 , No focal neurological deficits, DTR's  intact and symmetric    SKIN: No ulceration or induration present.      LABS:                          14.5   9.5   )-----------( 166      ( 25 May 2017 06:52 )             45.3           142  |  102  |  12  ----------------------------<  108<H>  3.6   |  31  |  0.78    Ca    9.0      25 May 2017 06:52    TPro  6.5  /  Alb  3.0<L>  /  TBili  0.4  /  DBili  x   /  AST  26  /  ALT  21  /  AlkPhos  71        PT/INR - ( 23 May 2017 23:09 )   PT: 11.1 sec;   INR: 1.02 ratio         PTT - ( 23 May 2017 23:09 )  PTT:31.3 sec  Urinalysis Basic - ( 23 May 2017 23:01 )    Color: Yellow / Appearance: Clear / S.015 / pH: x  Gluc: x / Ketone: Trace  / Bili: Negative / Urobili: Negative   Blood: x / Protein: Negative / Nitrite: Negative   Leuk Esterase: Negative / RBC: 11-25 /HPF / WBC Negative   Sq Epi: x / Non Sq Epi: Negative / Bacteria: Occasional    Culture data:    Culture - Blood (17 @ 09:11)    Specimen Source: .Blood Blood-Peripheral    Culture Results:   No growth to date.    Culture - Blood (17 @ 09:11)    Specimen Source: .Blood Blood-Peripheral    Culture Results:   No growth to date.    RADIOLOGY & ADDITIONAL STUDIES:  EXAM:  CT CHEST                        PROCEDURE DATE:  2017        INTERPRETATION:  CLINICAL INFORMATION:  Fever.    PROCEDURE:  Using multislice helical CT, 2.5 mm sections were obtained   from the thoracic inlet through the lung bases.  Multiplanar reformatted images.    COMPARISON: Chest radiograph 2017.  Impression:    Right upper lobe airspace consolidation with smaller areas of groundglass   and centrilobular/tree-in-bud nodular opacities, findings likely   represent pneumonia and infectious/inflammatory airway disease.  Recommend short interval follow-up chest CT examination in 10-12 weeks   following the appropriate clinical course of treatment.      Assessment :     71 year old male with PMH of MI , HLD , Bipolar disorder admitted with febrile syndrome and weakness , has had a URI for 1 week, RVP + for rhinovirus , now with cough. CXR shows faint RUL pneumonia with chronic tree and bud appearance suggestive of chronic bronchitis or bronchiectasis. In presence of normal  WBC this could christina atypical pneumonia or viral pneumonia .    Plan:  - send urine for legionella antigen   - as all  c s, if negative we can change to po antibiotics , total 7 days for pn  - repeat CXR in 1 week   - OOB and ambulate  - DC IVF     Continue with present supportive regime .  Appropriate use of antibiotics and adverse effects reviewed.    I have discussed the above plan of care with patient in detail. He  expressed understanding of the  treatment plan . Risks, benefits and alternatives discussed in detail. I have asked if he has  any questions or concerns and appropriately addressed them to the best of my ability .    > 35 minutes were spent in direct patient care reviewing notes, medications ,labs data/ imaging , discussion with multidisciplinary team.    Thank you for allowing me to participate in care of your patient .    Graham Lopez MD  [phone]

## 2017-05-26 VITALS — OXYGEN SATURATION: 95 %

## 2017-05-26 PROCEDURE — 84145 PROCALCITONIN (PCT): CPT

## 2017-05-26 PROCEDURE — 71250 CT THORAX DX C-: CPT

## 2017-05-26 PROCEDURE — 80048 BASIC METABOLIC PNL TOTAL CA: CPT

## 2017-05-26 PROCEDURE — 87040 BLOOD CULTURE FOR BACTERIA: CPT

## 2017-05-26 PROCEDURE — 87086 URINE CULTURE/COLONY COUNT: CPT

## 2017-05-26 PROCEDURE — 94664 DEMO&/EVAL PT USE INHALER: CPT

## 2017-05-26 PROCEDURE — 80053 COMPREHEN METABOLIC PANEL: CPT

## 2017-05-26 PROCEDURE — 94760 N-INVAS EAR/PLS OXIMETRY 1: CPT

## 2017-05-26 PROCEDURE — 81001 URINALYSIS AUTO W/SCOPE: CPT

## 2017-05-26 PROCEDURE — 71046 X-RAY EXAM CHEST 2 VIEWS: CPT

## 2017-05-26 PROCEDURE — 94640 AIRWAY INHALATION TREATMENT: CPT

## 2017-05-26 PROCEDURE — 87486 CHLMYD PNEUM DNA AMP PROBE: CPT

## 2017-05-26 PROCEDURE — 85610 PROTHROMBIN TIME: CPT

## 2017-05-26 PROCEDURE — 99221 1ST HOSP IP/OBS SF/LOW 40: CPT

## 2017-05-26 PROCEDURE — 83605 ASSAY OF LACTIC ACID: CPT

## 2017-05-26 PROCEDURE — 96365 THER/PROPH/DIAG IV INF INIT: CPT

## 2017-05-26 PROCEDURE — 85027 COMPLETE CBC AUTOMATED: CPT

## 2017-05-26 PROCEDURE — 87633 RESP VIRUS 12-25 TARGETS: CPT

## 2017-05-26 PROCEDURE — 99285 EMERGENCY DEPT VISIT HI MDM: CPT | Mod: 25

## 2017-05-26 PROCEDURE — 87449 NOS EACH ORGANISM AG IA: CPT

## 2017-05-26 PROCEDURE — 85730 THROMBOPLASTIN TIME PARTIAL: CPT

## 2017-05-26 PROCEDURE — 87581 M.PNEUMON DNA AMP PROBE: CPT

## 2017-05-26 PROCEDURE — 87798 DETECT AGENT NOS DNA AMP: CPT

## 2017-05-26 PROCEDURE — 93005 ELECTROCARDIOGRAM TRACING: CPT

## 2017-05-26 RX ORDER — DIVALPROEX SODIUM 500 MG/1
1 TABLET, DELAYED RELEASE ORAL
Qty: 0 | Refills: 0 | COMMUNITY

## 2017-05-26 RX ORDER — ALBUTEROL 90 UG/1
2 AEROSOL, METERED ORAL
Qty: 1 | Refills: 0 | OUTPATIENT
Start: 2017-05-26 | End: 2017-06-25

## 2017-05-26 RX ORDER — POTASSIUM CHLORIDE 20 MEQ
40 PACKET (EA) ORAL ONCE
Qty: 0 | Refills: 0 | Status: COMPLETED | OUTPATIENT
Start: 2017-05-26 | End: 2017-05-26

## 2017-05-26 RX ORDER — CEFUROXIME AXETIL 250 MG
1 TABLET ORAL
Qty: 8 | Refills: 0 | OUTPATIENT
Start: 2017-05-26 | End: 2017-05-30

## 2017-05-26 RX ORDER — CEFUROXIME AXETIL 250 MG
1 TABLET ORAL
Qty: 0 | Refills: 0 | COMMUNITY
Start: 2017-05-26

## 2017-05-26 RX ORDER — AZITHROMYCIN 500 MG/1
1 TABLET, FILM COATED ORAL
Qty: 0 | Refills: 0 | COMMUNITY
Start: 2017-05-26

## 2017-05-26 RX ORDER — LACTOBACILLUS ACIDOPHILUS 100MM CELL
0 CAPSULE ORAL
Qty: 0 | Refills: 0 | COMMUNITY
Start: 2017-05-26

## 2017-05-26 RX ORDER — ALBUTEROL 90 UG/1
2 AEROSOL, METERED ORAL
Qty: 0 | Refills: 0 | COMMUNITY

## 2017-05-26 RX ORDER — CEFUROXIME AXETIL 250 MG
1 TABLET ORAL
Qty: 9 | Refills: 0 | OUTPATIENT
Start: 2017-05-26 | End: 2017-05-30

## 2017-05-26 RX ORDER — AZITHROMYCIN 500 MG/1
1 TABLET, FILM COATED ORAL
Qty: 3 | Refills: 0 | OUTPATIENT
Start: 2017-05-26 | End: 2017-05-29

## 2017-05-26 RX ADMIN — Medication 100 MILLIGRAM(S): at 11:13

## 2017-05-26 RX ADMIN — Medication 3 MILLILITER(S): at 08:02

## 2017-05-26 RX ADMIN — Medication 3 MILLILITER(S): at 14:19

## 2017-05-26 RX ADMIN — Medication 500 MILLIGRAM(S): at 05:37

## 2017-05-26 RX ADMIN — DIVALPROEX SODIUM 500 MILLIGRAM(S): 500 TABLET, DELAYED RELEASE ORAL at 05:37

## 2017-05-26 RX ADMIN — Medication 1 TABLET(S): at 11:10

## 2017-05-26 RX ADMIN — ESCITALOPRAM OXALATE 10 MILLIGRAM(S): 10 TABLET, FILM COATED ORAL at 11:10

## 2017-05-26 RX ADMIN — Medication 40 MILLIEQUIVALENT(S): at 11:13

## 2017-05-26 RX ADMIN — Medication 81 MILLIGRAM(S): at 05:37

## 2017-05-26 RX ADMIN — ENOXAPARIN SODIUM 40 MILLIGRAM(S): 100 INJECTION SUBCUTANEOUS at 11:09

## 2017-05-26 RX ADMIN — AZITHROMYCIN 250 MILLIGRAM(S): 500 TABLET, FILM COATED ORAL at 11:09

## 2017-05-26 NOTE — DISCHARGE NOTE ADULT - HOSPITAL COURSE
71 year old male with PMH of MI , HLD , Bipolar disorder presents to the er for fever , chills and sob that started 8pm on 5/23/ 17 when patient was at his sons rehearsal band function. Patient states that he started feeling chills and had difficulty ambulating and SOB at rest. Patient has been having URI , nasal congestion and white mucus (productive cough for the past week. Patient has been taking tylenol cold with minimal relief.   In the Ed, Vitals significant for Tmax 101.3  Labs normal, Pt got IV Zithro & Rocephin.    Patient had a CXR which showed:     Pt being treated for PUL pneumonia (CT findings showed: Right upper lobe airspace consolidation with smaller areas of ground glass and centrilobular/tree-in-bud nodular opacities, findings likely represent pneumonia and infectious/inflammatory airway disease), pt also tested positive for Enterovirus/ Rhinovirus.      Today, pt seen and assessed at bedside; pt states that he feels well; pt states that his cough is resolving, and that overall pt states that he feels very well and feels well enough to go home.    Pt denied any: fevers, chills, HA  CP, palpitations, SOB,  abdominal pain, N/V/D/C, LE edema or any other issues. 71 year old male with PMH of MI , HLD , Bipolar disorder presents to the er for fever , chills and sob that started 8pm on 5/23/ 17 when patient was at his sons rehearsal band function. Patient states that he started feeling chills and had difficulty ambulating and SOB at rest. Patient has been having URI , nasal congestion and white mucus (productive cough for the past week. Patient has been taking tylenol cold with minimal relief.   In the ED, Vitals significant for Tmax 101.3  Labs normal, Pt got IV Zithro & Rocephin.    Patient had a CXR which showed:   The heart size and mediastinum is within normal limits.  There is a poorly defined nodular consolidation right perihilar lung.   This is better visualized on CT scan of the chest from 5/24/2017.  No significant pleural effusion is noted.  There is biapical pleural thickening.  CT chest showed: Right upper lobe airspace consolidation with smaller areas of ground glass and centrilobular/tree-in-bud nodular opacities, findings likely represent pneumonia and infectious/inflammatory airway disease. Recommend short interval follow-up chest CT examination in 10-12 weeks following the appropriate clinical course of treatment.    Pt was admitted and being treated for RUL pneumonia, and pt also tested positive for Enterovirus/ Rhinovirus.   Patient has been treated with Duoneb prn treatments, as well as a course of IV Zithro & Rocephin; the patient was then switched to PO antibiotics:  PO Ceftin x 5 days and PO Zithromax x 4days total.  As per ID, the pt is to have a repeat CXR in 1 week as outpatient upon discharge with PMD.  The patient is also going to be sent home on Proventil prn.    The patient was also advised to see his Cardiologist at [facility] for further care, and to inquire about need for use of beta-blocker considering patients medical history.    Today, pt seen and assessed at bedside; pt states that he feels well; pt states that his cough is resolving, and that overall pt states that he feels very well and feels well enough to go home.  Patient is medically optimized for discharge home.

## 2017-05-26 NOTE — PROGRESS NOTE ADULT - SUBJECTIVE AND OBJECTIVE BOX
COLTONASHLEY GAVIRIA is a 71yMale , patient examined and chart reviewed. Patient seen and examined today being followed for possible pneumonia and syncope      INTERVAL HPI/ OVERNIGHT EVENTS: Doing well, feels well no new complaints, appetite is good. ambulating to bathroom.    PAST MEDICAL & SURGICAL HISTORY:  MI (myocardial infarction)  H/O gastric ulcer: back in 1978  Bipolar disorder  History of gastrectomy: in 1978, for gastric ulcer      For details regarding the patient's social history, family history, and other miscellaneous elements, please refer the initial infectious diseases consultation and/or the admitting history and physical examination for this admission.      ROS:  CONSTITUTIONAL:  Negative fever or chills, feels well, good appetite  EYES:  Negative  blurry vision or double vision  CARDIOVASCULAR:  Negative for chest pain or palpitations  RESPIRATORY:  Negative for cough, wheezing, or SOB   GASTROINTESTINAL:  Negative for nausea, vomiting, diarrhea, constipation, or abdominal pain  GENITOURINARY:  Negative frequency, urgency or dysuria  NEUROLOGIC:  No headache, confusion, dizziness, lightheadedness  All other systems were reviewed and are negative         Current inpatient medications :    ANTIBIOTICS/RELEVANT:  lactobacillus acidophilus 1Tablet(s) Oral two times a day with meals  azithromycin   Tablet 250milliGRAM(s) Oral daily  cefuroxime   Tablet 500milliGRAM(s) Oral every 12 hours      acetaminophen   Tablet 650milliGRAM(s) Oral every 6 hours PRN  aspirin enteric coated 81milliGRAM(s) Oral two times a day  escitalopram 10milliGRAM(s) Oral daily  atorvastatin 20milliGRAM(s) Oral at bedtime  enoxaparin Injectable 40milliGRAM(s) SubCutaneous daily  ALBUTerol/ipratropium for Nebulization 3milliLiter(s) Nebulizer every 8 hours  diVALproex DR 500milliGRAM(s) Oral two times a day  senna 2Tablet(s) Oral at bedtime  docusate sodium 100milliGRAM(s) Oral daily      Objective:    I & Os for current day (as of 05-26 @ 08:34)  =============================================  IN: 740 ml / OUT: 350 ml / NET: 390 ml    T(C): 37.2, Max: 37.3 (05-25 @ 13:10)  HR: 90 (78 - 90)  BP: 135/69 (129/68 - 149/75)  RR: 17 (16 - 17)  SpO2: 95% (91% - 97%)  Wt(kg): --      Physical Exam:  GEN: NAD, pleasant  HEENT: normocephalic and atraumatic.  EOMI. GILSON. Moist mucosa. Clear Posterior pharynx.  NECK: Supple. No carotid bruits.  No lymphadenopathy or thyromegaly.  LUNGS: Clear to auscultation.  HEART: Regular rate and rhythm without murmur.  ABDOMEN: Soft, nontender, and nondistended.  Positive bowel sounds.  No hepatosplenomegaly was noted.  EXTREMITIES: Without any cyanosis, clubbing, rash, lesions or edema. 2 + peripheral pulses   NEUROLOGIC: Alert & oriented x 3 , No focal neurological deficits, DTR's  intact and symmetric    SKIN: No ulceration or induration present.      LABS:                          14.5   9.5   )-----------( 166      ( 25 May 2017 06:52 )             45.3       05-25    142  |  102  |  12  ----------------------------<  108<H>  3.6   |  31  |  0.78    Ca    9.0      25 May 2017 06:52    Legionella Antigen: negative     Assessment :     71 year old male with PMH of MI , HLD , Bipolar disorder admitted with febrile syndrome and weakness , has had a URI for 1 week, RVP + for rhinovirus , now with cough. CXR shows faint RUL pneumonia with chronic tree and bud appearance suggestive of chronic bronchitis or bronchiectasis. In presence of normal  WBC this could be a atypical pneumonia or viral pneumonia .Clinically he has improved     Plan:  - He is on po antibiotics , total 7 days for pneumonia   - repeat CXR in 1 week , follow up with PMD  - OOB and ambulate  - DC plan home today     Continue with present supportive regime .  Appropriate use of antibiotics and adverse effects reviewed.    I have discussed the above plan of care with patient in detail. He expressed understanding of the  treatment plan . Risks, benefits and alternatives discussed in detail. I have asked if he has any questions or concerns and appropriately addressed them to the best of my ability .    > 35 minutes were spent in direct patient care reviewing notes, medications ,labs data/ imaging , discussion with multidisciplinary team.    Thank you for allowing me to participate in care of your patient .    Graham Lopez MD  [phone]

## 2017-05-26 NOTE — DISCHARGE NOTE ADULT - CARE PROVIDER_API CALL
Derrick Ayoub), Internal Medicine  [address] [place] [address]  Phone: [phone]  Fax: [phone]

## 2017-05-26 NOTE — PROGRESS NOTE ADULT - SUBJECTIVE AND OBJECTIVE BOX
Patient is a 71y old  Male who presents with a chief complaint of c/o shaking of both hand and weakness from yesterday (24 May 2017 06:35)      INTERVAL HPI:  71 year old male with PMH of MI , HLD , Bipolar disorder presents to the er for fever , chills and sob that started 8pm on 5/23/ 17 when patient was at his sons rehearsal band function. Patient states that he started feeling chills and had difficulty ambulating and SOB at rest. Patient has been having URI , nasal congestion and white mucus (productive cough for the past week. Patient has been taking tylenol cold with minimal relief.     Pt being treated for PUL pneumonia (CT findings showed: Right upper lobe airspace consolidation with smaller areas of ground glass and centrilobular/tree-in-bud nodular opacities, findings likely represent pneumonia and infectious/inflammatory airway disease), pt also tested positive for Enterovirus/ Rhinovirus.      Today, pt seen and assessed at bedside; pt states that he feels well; pt states that his cough is resolving, and that overall pt states that he feels very well and feels well enough to go home.    Pt denied any: fevers, chills, HA  CP, palpitations, SOB,  abdominal pain, N/V/D/C, LE edema or any other issues.      OVERNIGHT EVENTS: NONE     MEDICATIONS  (STANDING):  aspirin enteric coated 81milliGRAM(s) Oral two times a day  escitalopram 10milliGRAM(s) Oral daily  atorvastatin 20milliGRAM(s) Oral at bedtime  enoxaparin Injectable 40milliGRAM(s) SubCutaneous daily  ALBUTerol/ipratropium for Nebulization 3milliLiter(s) Nebulizer every 8 hours  lactobacillus acidophilus 1Tablet(s) Oral two times a day with meals  azithromycin   Tablet 250milliGRAM(s) Oral daily  cefuroxime   Tablet 500milliGRAM(s) Oral every 12 hours  diVALproex DR 500milliGRAM(s) Oral two times a day  senna 2Tablet(s) Oral at bedtime  docusate sodium 100milliGRAM(s) Oral daily    MEDICATIONS  (PRN):  acetaminophen   Tablet 650milliGRAM(s) Oral every 6 hours PRN For Temp greater than 38 C (100.4 F)      Allergies    No Known Allergies    Intolerances      REVIEW OF SYSTEMS:  CONSTITUTIONAL: No fever, No chills, No fatigue, No myalgia, No Body ache  EYES: No eye pain, visual disturbances, or discharge  ENMT:  No ear pain, No nose bleed, No vertigo; No sinus or throat pain, No Congestion  NECK: No pain, No stiffness  RESPIRATORY: ++ cough, No wheezing, No  hemoptysis, No shortness of breath  CARDIOVASCULAR: No chest pain, palpitations  GASTROINTESTINAL: No abdominal or epigastric pain. No nausea, No vomiting; No diarrhea or constipation. [  ] BM  GENITOURINARY: No dysuria, No frequency, No urgency, No hematuria, or incontinence  NEUROLOGICAL: No headaches, No dizziness, No numbness, No tingling, No tremors, No weakness  EXT: No Swelling, No Pain, No Edema  SKIN:  [x  ] No itching, burning, rashes, or lesions   MUSCULOSKELETAL: No joint pain or swelling; No muscle pain, No back pain, No extremity pain  PSYCHIATRIC: No depression, anxiety, mood swings or difficulty sleeping at night  PAIN SCALE: [ x ] None  [  ] Other-  ROS Unable to obtain due to - [  ] Dementia  [  ] Lethargy  [  ] Sedated   REST OF REVIEW Of SYSTEM - [ x ] Normal       Vital Signs Last 24 Hrs  T(C): 37.2, Max: 37.3 (05-25 @ 13:10)  T(F): 99, Max: 99.2 (05-25 @ 13:10)  HR: 90 (78 - 90)  BP: 135/69 (129/68 - 149/75)  BP(mean): --  RR: 17 (16 - 17)  SpO2: 95% (91% - 97%)  Finger Stick        I & Os for current day (as of 05-26 @ 09:20)  =============================================  IN: 740 ml / OUT: 350 ml / NET: 390 ml      PHYSICAL EXAM:  GENERAL:  [x ] NAD , [ x ] well appearing, [  ] Agitated, [  ] Lethargy, [  ] confused   HEAD:  [ x ] Normal, [  ] Other  EYES:  [ x ] EOMI, [x] PERRLA, [ x ] conjunctiva and sclera clear normal, [  ] Other,  [  ] Pallor,[  ] Discharge  ENMT:  [ x ] Normal, [x ] Moist mucous membranes, [x  ] Good dentition, [  ] No Thrush  NECK:  [ x ] Supple, [ x ] No JVD, [ x ] Normal thyroid, [  ] Lymphadenopathy [  ] Other  NERVOUS SYSTEM:  [x  ] Alert & Oriented X3, [  ] Nonfocal   [  ] Confusion  [  ] Encephalopathic [  ] Sedated [  ] Other-  CHEST/LUNG:  [x  ] Clear to auscultation bilaterally, [ x] No rales, [ x ] few rhonchi Rt lung [ x ]  No wheezing  HEART:  [ x Regular rate and rhythm  [  ] irregular  [  ] No murmurs, rubs, or gallops, [  ] PPM in place (Mfr:  )  ABDOMEN:  [ x ] Soft, [x  ] Nontender, [ x ] Nondistended, [ x ]No mass, [ x ] Bowel sounds present, [  ] obese  EXTREMITIES: [ x ] 2+ Peripheral Pulses, No clubbing, cyanosis,  [  ] edema, [  ] PVD stasis skin changes  LYMPH: No lymphadenopathy noted  SKIN:  [x  ] No rashes or lesions, [  ] Pressure Ulcers, [  ] echymosis, [  ] Other    DIET: DASH    LABS:      Ca    9.0        25 May 2017 06:52            Culture Results:   No growth to date. (05-24 @ 09:11)  Culture Results:   No growth to date. (05-24 @ 09:11)  Culture Results:   No growth (05-24 @ 09:09)          RECENT CULTURES:  05-24 @ 09:11 .Blood Blood-Peripheral                No growth to date.    05-24 @ 09:09 .Urine Clean Catch (Midstream)                No growth          RESPIRATORY CULTURES:      cardiac Enzyme:        Anemia panel:          RADIOLOGY & ADDITIONAL TESTS:      HEALTH ISSUES - PROBLEM Dx:  HLD (hyperlipidemia): HLD (hyperlipidemia)  Need for prophylactic measure: Need for prophylactic measure  MI (myocardial infarction): MI (myocardial infarction)  Bipolar disorder: Bipolar disorder  PNA (pneumonia): PNA (pneumonia)          Consultant(s) Notes Reviewed:  [  ] YES     Care Discussed with [X] Consultants  [  ] Patient  [  ] Family  [  ]   [  ] Social Service  [  ] RN, [  ] Physical Therapy  DVT PPX: [  ] Lovenox, [  ] S C Heparin, [  ] Coumadin, [  ] Xarelto, [  ] Eliquis, [  ] SCD   Advanced directive: [  ] None, [  ] DNR/DNI Patient is a 71y old  Male who presents with a chief complaint of c/o shaking of both hand and weakness from yesterday (24 May 2017 06:35)      INTERVAL HPI:  71 year old male with PMH of MI , HLD , Bipolar disorder presents to the er for fever , chills and sob that started 8pm on 5/23/ 17 when patient was at his sons rehearsal band function. Patient states that he started feeling chills and had difficulty ambulating and SOB at rest. Patient has been having URI , nasal congestion and white mucus (productive cough for the past week. Patient has been taking tylenol cold with minimal relief.     Pt being treated for PUL pneumonia (CT findings showed: Right upper lobe airspace consolidation with smaller areas of ground glass and centrilobular/tree-in-bud nodular opacities, findings likely represent pneumonia and infectious/inflammatory airway disease), pt also tested positive for Enterovirus/ Rhinovirus.      Today, pt seen and assessed at bedside; pt states that he feels well; pt states that his cough is resolving, and that overall pt states that he feels very well and feels well enough to go home.    Pt denied any: fevers, chills, HA  CP, palpitations, SOB,  abdominal pain, N/V/D/C, LE edema or any other issues. pt on PO Abx. stable for d/c.No complaints.    OVERNIGHT EVENTS: NONE     MEDICATIONS  (STANDING):  aspirin enteric coated 81milliGRAM(s) Oral two times a day  escitalopram 10milliGRAM(s) Oral daily  atorvastatin 20milliGRAM(s) Oral at bedtime  enoxaparin Injectable 40milliGRAM(s) SubCutaneous daily  ALBUTerol/ipratropium for Nebulization 3milliLiter(s) Nebulizer every 8 hours  lactobacillus acidophilus 1Tablet(s) Oral two times a day with meals  azithromycin   Tablet 250milliGRAM(s) Oral daily  cefuroxime   Tablet 500milliGRAM(s) Oral every 12 hours  diVALproex DR 500milliGRAM(s) Oral two times a day  senna 2Tablet(s) Oral at bedtime  docusate sodium 100milliGRAM(s) Oral daily    MEDICATIONS  (PRN):  acetaminophen   Tablet 650milliGRAM(s) Oral every 6 hours PRN For Temp greater than 38 C (100.4 F)      Allergies    No Known Allergies      REVIEW OF SYSTEMS:  CONSTITUTIONAL: No fever, No chills, No fatigue, No myalgia, No Body ache  EYES: No eye pain, visual disturbances, or discharge  ENMT:  No ear pain, No nose bleed, No vertigo; No sinus or throat pain, No Congestion  NECK: No pain, No stiffness  RESPIRATORY: ++ cough, No wheezing, No  hemoptysis, No shortness of breath  CARDIOVASCULAR: No chest pain, palpitations  GASTROINTESTINAL: No abdominal or epigastric pain. No nausea, No vomiting; No diarrhea or constipation. [  ] BM  GENITOURINARY: No dysuria, No frequency, No urgency, No hematuria, or incontinence  NEUROLOGICAL: No headaches, No dizziness, No numbness, No tingling, No tremors, No weakness  EXT: No Swelling, No Pain, No Edema  SKIN:  [x  ] No itching, burning, rashes, or lesions   MUSCULOSKELETAL: No joint pain or swelling; No muscle pain, No back pain, No extremity pain  PSYCHIATRIC: No depression, anxiety, mood swings or difficulty sleeping at night  PAIN SCALE: [ x ] None  [  ] Other-  ROS Unable to obtain due to - [  ] Dementia  [  ] Lethargy  [  ] Sedated   REST OF REVIEW Of SYSTEM - [ x ] Normal       Vital Signs Last 24 Hrs  T(C): 37.2, Max: 37.3 (05-25 @ 13:10)  T(F): 99, Max: 99.2 (05-25 @ 13:10)  HR: 90 (78 - 90)  BP: 135/69 (129/68 - 149/75)  BP(mean): --  RR: 17 (16 - 17)  SpO2: 95% (91% - 97%)  Finger Stick        I & Os for current day (as of 05-26 @ 09:20)  =============================================  IN: 740 ml / OUT: 350 ml / NET: 390 ml      PHYSICAL EXAM:  GENERAL:  [x ] NAD , [ x ] well appearing, [  ] Agitated, [  ] Lethargy, [  ] confused   HEAD:  [ x ] Normal, [  ] Other  EYES:  [ x ] EOMI, [x] PERRLA, [ x ] conjunctiva and sclera clear normal, [  ] Other,  [  ] Pallor,[  ] Discharge  ENMT:  [ x ] Normal, [x ] Moist mucous membranes, [x  ] Good dentition, [  ] No Thrush  NECK:  [ x ] Supple, [ x ] No JVD, [ x ] Normal thyroid, [  ] Lymphadenopathy [  ] Other  NERVOUS SYSTEM:  [x  ] Alert & Oriented X3, [  ] Nonfocal   [  ] Confusion  [  ] Encephalopathic [  ] Sedated [  ] Other-  CHEST/LUNG:  [x  ] Clear to auscultation bilaterally, [ x] No rales, [ x ] few rhonchi Rt lung [ x ]  No wheezing  HEART:  [ x Regular rate and rhythm  [  ] irregular  [  ] No murmurs, rubs, or gallops, [  ] PPM in place (Mfr:  )  ABDOMEN:  [ x ] Soft, [x  ] Nontender, [ x ] Nondistended, [ x ]No mass, [ x ] Bowel sounds present, [  ] obese  EXTREMITIES: [ x ] 2+ Peripheral Pulses, No clubbing, cyanosis,  [  ] edema, [  ] PVD stasis skin changes  LYMPH: No lymphadenopathy noted  SKIN:  [x  ] No rashes or lesions, [  ] Pressure Ulcers, [  ] ecchymoses [  ] Other    DIET: DASH    LABS:      Ca    9.0        25 May 2017 06:52            Culture Results:   No growth to date. (05-24 @ 09:11)  Culture Results:   No growth to date. (05-24 @ 09:11)  Culture Results:   No growth (05-24 @ 09:09)          RECENT CULTURES:  05-24 @ 09:11 .Blood Blood-Peripheral                No growth to date.    05-24 @ 09:09 .Urine Clean Catch (Midstream)                No growth          RESPIRATORY CULTURES:      cardiac Enzyme:        Anemia panel:          RADIOLOGY & ADDITIONAL TESTS:      HEALTH ISSUES - PROBLEM Dx:  HLD (hyperlipidemia): HLD (hyperlipidemia)  Need for prophylactic measure: Need for prophylactic measure  MI (myocardial infarction): MI (myocardial infarction)  Bipolar disorder: Bipolar disorder  PNA (pneumonia): PNA (pneumonia)          Consultant(s) Notes Reviewed:  [x] YES     Care Discussed with [X] Consultants  [ x ] Patient  [  ] Family  [  ]   [  ] Social Service  [ x ] RN, [  ] Physical Therapy  DVT PPX: [ x ] Lovenox, [  ] S C Heparin, [  ] Coumadin, [  ] Xarelto, [  ] Eliquis, [  ] SCD   Advanced directive: [  ] None, [  ] DNR/DNI

## 2017-05-26 NOTE — DISCHARGE NOTE ADULT - MEDICATION SUMMARY - MEDICATIONS TO TAKE
I will START or STAY ON the medications listed below when I get home from the hospital:    aspirin 81 mg oral tablet  -- 1 tab(s) by mouth 2 times a day  -- Indication: For CAD (coronary artery disease)    Depakote  mg oral tablet, extended release  -- 1 tab(s) by mouth 2 times a day  -- Indication: For Bipolar disorder    Lexapro 10 mg oral tablet  -- 1 tab(s) by mouth once a day  -- Indication: For Bipolar disorder    atorvastatin 20 mg oral tablet  -- 1 tab(s) by mouth once a day  -- Indication: For CAD (coronary artery disease)    Proventil HFA 90 mcg/inh inhalation aerosol  -- 2 puff(s) inhaled 4 times a day  -- Indication: For PNA (pneumonia)    cefuroxime 500 mg oral tablet  -- 1 tab(s) by mouth every 12 hours, for a total of 4.5 days (9 tabs total)  -- Indication: For PNA (pneumonia)    azithromycin 250 mg oral tablet  -- 1 tab(s) by mouth once a day, for a total of 3 more days  -- Indication: For PNA (pneumonia)    lactobacillus acidophilus oral capsule  --  by mouth   -- Indication: For supplementation

## 2017-05-26 NOTE — DISCHARGE NOTE ADULT - PATIENT PORTAL LINK FT
“You can access the [facility] Patient Portal, offered by [facility], by registering with the following website: [email or web address]”

## 2017-05-26 NOTE — PROGRESS NOTE ADULT - ASSESSMENT
71 year old male with PMH of MI , HLD , Bipolar disorder admitted for CAP:RT LL with Ac febrile Viral illness. 71 year old male with PMH of MI , HLD , Bipolar disorder admitted for CAP:RT LL with Ac febrile Viral illness.  Improved. stable for D/c. on PO Abx.

## 2017-05-26 NOTE — DISCHARGE NOTE ADULT - CARE PLAN
Principal Discharge DX:	PNA (pneumonia)  Goal:	Resolve  Instructions for follow-up, activity and diet:	-You were admitted with community-acquired pneumonia. Your CT of the chest showed: Right upper lobe airspace consolidation with smaller areas of groundglass and centrilobular/tree-in-bud nodular opacities, findings likely represent pneumonia and infectious/inflammatory airway disease.  Recommend short interval follow-up chest CT examination in 10-12 weeks following the appropriate clinical course of treatment.  -You were also found to have Enterovirus/ Rhinovirus  -You were treated with a course of IV Rocephin and IV Zithromax; you are to continue to take:   -Oral Ceftin, 500mg, 1 tab every 12 hours (for a total of 9 tablets/ 4.5 days, upon discharge)  -Oral Zithromax 250mg, 1 tab once daily for 3 additional days upon discharge   -Please continue to use Proventil HFA, as needed for improved breathing   -Please followup with your PMD upon discharge for further maintenance and evaluation  -As per the Infectious Disease specialist recommendations, please followup with your PMD within 1 week for a repeat chest x-ray in 1 week.  Secondary Diagnosis:	HLD (hyperlipidemia)  Instructions for follow-up, activity and diet:	1. Please continue to take your home medications as above (Lipitor)  Secondary Diagnosis:	Bipolar disorder  Instructions for follow-up, activity and diet:	1. Please continue to take your home medications as above (Depakote, Lexapro)  Secondary Diagnosis:	CAD (coronary artery disease)  Instructions for follow-up, activity and diet:	1. Please continue to take your home medications: Aspirin 81mg oral daily Principal Discharge DX:	PNA (pneumonia)  Goal:	Resolve  Instructions for follow-up, activity and diet:	-You were admitted with community-acquired pneumonia. Your CT of the chest showed: Right upper lobe airspace consolidation with smaller areas of groundglass and centrilobular/tree-in-bud nodular opacities, findings likely represent pneumonia and infectious/inflammatory airway disease.  Recommend short interval follow-up chest CT examination in 10-12 weeks following the appropriate clinical course of treatment.  -You were also found to have Enterovirus/ Rhinovirus  -You were treated with a course of IV Rocephin and IV Zithromax; you are to continue to take:   -Oral Ceftin, 500mg, 1 tab every 12 hours (for a total of 9 tablets/ 4.5 days, upon discharge)  -Oral Zithromax 250mg, 1 tab once daily for 3 additional days upon discharge   -Please continue to use Proventil HFA, as needed for improved breathing   -Please followup with your PMD upon discharge for further maintenance and evaluation  -As per the Infectious Disease specialist recommendations, please followup with your PMD within 1 week for a repeat chest x-ray in 1 week.  Secondary Diagnosis:	HLD (hyperlipidemia)  Instructions for follow-up, activity and diet:	1. Please continue to take your home medications as above (Lipitor)  Secondary Diagnosis:	Bipolar disorder  Instructions for follow-up, activity and diet:	1. Please continue to take your home medications as above (Depakote, Lexapro)  Secondary Diagnosis:	CAD (coronary artery disease)  Instructions for follow-up, activity and diet:	1. Please continue to take your home medications: Aspirin 81mg oral daily  -Please followup with your Cardiologist within 1 week for further evaluation and maintenance--please inquire about whether you should be on a beta-blocker medication considering your cardiac history. Principal Discharge DX:	PNA (pneumonia)  Goal:	Resolve  Instructions for follow-up, activity and diet:	-You were admitted with community-acquired pneumonia. Your CT of the chest showed: Right upper lobe airspace consolidation with smaller areas of ground glass and centrilobular/tree-in-bud nodular opacities, findings likely represent pneumonia and infectious/inflammatory airway disease.  Recommend short interval follow-up chest CT examination in 10-12 weeks following the appropriate clinical course of treatment.  -You were also found to have Enterovirus/ Rhinovirus  -You were treated with a course of IV Rocephin and IV Zithromax; you are to continue to take:   -Oral Ceftin, 500mg, 1 tab every 12 hours (for a total of 9 tablets/ 4.5 days, upon discharge)  -Oral Zithromax 250mg, 1 tab once daily for 3 additional days upon discharge   -Please continue to use Proventil HFA, as needed for improved breathing   -Please followup with your PMD upon discharge for further maintenance and evaluation  -As per the Infectious Disease specialist recommendations, please followup with your PMD within 1 week for a repeat chest x-ray in 1 week.  Secondary Diagnosis:	HLD (hyperlipidemia)  Instructions for follow-up, activity and diet:	1. Please continue to take your home medications as above (Lipitor)  Secondary Diagnosis:	Bipolar disorder  Instructions for follow-up, activity and diet:	1. Please continue to take your home medications as above (Depakote, Lexapro)  Secondary Diagnosis:	CAD (coronary artery disease)  Instructions for follow-up, activity and diet:	1. Please continue to take your home medications: Aspirin 81mg oral daily  -Please followup with your Cardiologist within 1 week for further evaluation and maintenance--please inquire about whether you should be on a beta-blocker medication considering your cardiac history. Principal Discharge DX:	PNA (pneumonia)  Goal:	Resolve  Instructions for follow-up, activity and diet:	-You were admitted with community-acquired pneumonia. & Ac Viral illness .Your CT of the chest showed: Right upper lobe airspace consolidation with smaller areas of ground glass and centrilobular/tree-in-bud nodular opacities, findings likely represent pneumonia and infectious/inflammatory airway disease.  Recommend short interval follow-up chest CT examination in 10-12 weeks following the appropriate clinical course of treatment.  -You were also found to have Enterovirus/ Rhinovirus infection  -You were treated with a course of IV Rocephin and IV Zithromax; you are to continue to take:   -Oral Ceftin, 500mg, 1 tab every 12 hours (for a total of 9 tablets/ 4.5 days, upon discharge)  -Oral Zithromax 250mg, 1 tab once daily for 3 additional days upon discharge , continue Probiotics Bacid 2x day for 2-3 weeks  -Please continue to use Proventil HFA, as needed for improved breathing   -Please followup with your PMD upon discharge  next week for CBC BMP next week & Repeat Chest X Ray in 1 week & CT  scan Chest in 12 weeks.  -As per the Infectious Disease specialist recommendations, please followup with your PMD within 1 week for a repeat chest x-ray in 1 week.  Secondary Diagnosis:	HLD (hyperlipidemia)  Instructions for follow-up, activity and diet:	1. Please continue to take your home medications as above (Lipitor)  Secondary Diagnosis:	Bipolar disorder  Instructions for follow-up, activity and diet:	1. Please continue to take your home medications as above (Depakote, Lexapro)  Secondary Diagnosis:	CAD (coronary artery disease)  Instructions for follow-up, activity and diet:	1. Please continue to take your home medications: Aspirin 81mg oral daily & statin  -Please followup with your Cardiologist within 1 week for further evaluation and maintenance--please inquire about whether you should be on a beta-blocker medication considering your cardiac history.

## 2017-05-26 NOTE — DISCHARGE NOTE ADULT - PLAN OF CARE
[facility] -You were admitted with community-acquired pneumonia. Your CT of the chest showed: Right upper lobe airspace consolidation with smaller areas of groundglass and centrilobular/tree-in-bud nodular opacities, findings likely represent pneumonia and infectious/inflammatory airway disease.  Recommend short interval follow-up chest CT examination in 10-12 weeks following the appropriate clinical course of treatment.  -You were also found to have Enterovirus/ Rhinovirus  -You were treated with a course of IV Rocephin and IV Zithromax; you are to continue to take:   -Oral Ceftin, 500mg, 1 tab every 12 hours (for a total of 9 tablets/ 4.5 days, upon discharge)  -Oral Zithromax 250mg, 1 tab once daily for 3 additional days upon discharge   -Please continue to use Proventil HFA, as needed for improved breathing   -Please followup with your PMD upon discharge for further maintenance and evaluation  -As per the Infectious Disease specialist recommendations, please followup with your PMD within 1 week for a repeat chest x-ray in 1 week. 1. Please continue to take your home medications as above (Lipitor) 1. Please continue to take your home medications as above (Depakote, Lexapro) 1. Please continue to take your home medications: Aspirin 81mg oral daily 1. Please continue to take your home medications: Aspirin 81mg oral daily  -Please followup with your Cardiologist within 1 week for further evaluation and maintenance--please inquire about whether you should be on a beta-blocker medication considering your cardiac history. -You were admitted with community-acquired pneumonia. Your CT of the chest showed: Right upper lobe airspace consolidation with smaller areas of ground glass and centrilobular/tree-in-bud nodular opacities, findings likely represent pneumonia and infectious/inflammatory airway disease.  Recommend short interval follow-up chest CT examination in 10-12 weeks following the appropriate clinical course of treatment.  -You were also found to have Enterovirus/ Rhinovirus  -You were treated with a course of IV Rocephin and IV Zithromax; you are to continue to take:   -Oral Ceftin, 500mg, 1 tab every 12 hours (for a total of 9 tablets/ 4.5 days, upon discharge)  -Oral Zithromax 250mg, 1 tab once daily for 3 additional days upon discharge   -Please continue to use Proventil HFA, as needed for improved breathing   -Please followup with your PMD upon discharge for further maintenance and evaluation  -As per the Infectious Disease specialist recommendations, please followup with your PMD within 1 week for a repeat chest x-ray in 1 week. 1. Please continue to take your home medications: Aspirin 81mg oral daily & statin  -Please followup with your Cardiologist within 1 week for further evaluation and maintenance--please inquire about whether you should be on a beta-blocker medication considering your cardiac history. -You were admitted with community-acquired pneumonia. & Ac Viral illness .Your CT of the chest showed: Right upper lobe airspace consolidation with smaller areas of ground glass and centrilobular/tree-in-bud nodular opacities, findings likely represent pneumonia and infectious/inflammatory airway disease.  Recommend short interval follow-up chest CT examination in 10-12 weeks following the appropriate clinical course of treatment.  -You were also found to have Enterovirus/ Rhinovirus infection  -You were treated with a course of IV Rocephin and IV Zithromax; you are to continue to take:   -Oral Ceftin, 500mg, 1 tab every 12 hours (for a total of 9 tablets/ 4.5 days, upon discharge)  -Oral Zithromax 250mg, 1 tab once daily for 3 additional days upon discharge , continue Probiotics Bacid 2x day for 2-3 weeks  -Please continue to use Proventil HFA, as needed for improved breathing   -Please followup with your PMD upon discharge  next week for CBC BMP next week & Repeat Chest X Ray in 1 week & CT  scan Chest in 12 weeks.  -As per the Infectious Disease specialist recommendations, please followup with your PMD within 1 week for a repeat chest x-ray in 1 week.

## 2017-05-26 NOTE — PROGRESS NOTE ADULT - ATTENDING COMMENTS
Pt seen, examined, case & care plan d/w residents, pt & PMD Dr ARCHANA Ayoub at detail.  D/C home.pt to see PMD next week.
Pt seen,examined, Case & care plan d/w residents, pt at detail.
Pt seen,examined, case & care plan d/w residents,ID Dr Lopez, Pt & Family at detail.

## 2017-05-26 NOTE — DISCHARGE NOTE ADULT - MEDICATION SUMMARY - MEDICATIONS TO CHANGE
I will SWITCH the dose or number of times a day I take the medications listed below when I get home from the hospital:    Depakote 250 mg oral delayed release tablet  -- 1 tab(s) by mouth 3 times a day

## 2017-05-26 NOTE — PROGRESS NOTE ADULT - PROBLEM SELECTOR PLAN 1
-Likely CAP (atypical vs viral, as per ID); CT chest showed: RUL consolidation/ ground glass opacities  -RVP tested positive for Entervirus/ Rhinovirus as well   -tested negative for legionella urine antigen  -pt D/C's from IV abs and started on PO Ceftin x 5 days and PO Zithromax x 4days   - repeat CXR in 1 week as outpatient upon discharge with PMD   - OOB and ambulate  -Continue: daily, Duoneb treatments   -Tylenol for fever PRN  -blood cx x2 NGTD; UCx NGTD   -f/u ID Dr. KWAN Lopez [facility] in AM -Likely CAP (atypical vs viral, as per ID); CT chest showed: RUL consolidation/ ground glass opacities, Improved No cough.  -RVP tested positive for Entervirus/ Rhinovirus as well   -tested negative for legionella urine antigen  -pt D/C's from IV abs and started on PO Ceftin x 5 days and PO Zithromax x 4days as per ID Dr CHAPITO Lopez.CT Chest in 12 weeks  - repeat CXR in 1 week as outpatient upon discharge with PMD   - OOB and ambulate  -Continue: daily, Duoneb treatments   -Tylenol for fever PRN  -blood cx x2 NGTD; UCx NGTD   -f/u ID Dr. KWAN Lopez reccs in AM

## 2017-05-29 LAB
CULTURE RESULTS: SIGNIFICANT CHANGE UP
CULTURE RESULTS: SIGNIFICANT CHANGE UP
SPECIMEN SOURCE: SIGNIFICANT CHANGE UP
SPECIMEN SOURCE: SIGNIFICANT CHANGE UP

## 2017-05-31 DIAGNOSIS — I25.2 OLD MYOCARDIAL INFARCTION: ICD-10-CM

## 2017-05-31 DIAGNOSIS — Z82.49 FAMILY HISTORY OF ISCHEMIC HEART DISEASE AND OTHER DISEASES OF THE CIRCULATORY SYSTEM: ICD-10-CM

## 2017-05-31 DIAGNOSIS — Z87.891 PERSONAL HISTORY OF NICOTINE DEPENDENCE: ICD-10-CM

## 2017-05-31 DIAGNOSIS — J18.9 PNEUMONIA, UNSPECIFIED ORGANISM: ICD-10-CM

## 2017-05-31 DIAGNOSIS — J42 UNSPECIFIED CHRONIC BRONCHITIS: ICD-10-CM

## 2017-05-31 DIAGNOSIS — I25.10 ATHEROSCLEROTIC HEART DISEASE OF NATIVE CORONARY ARTERY WITHOUT ANGINA PECTORIS: ICD-10-CM

## 2017-05-31 DIAGNOSIS — B97.89 OTHER VIRAL AGENTS AS THE CAUSE OF DISEASES CLASSIFIED ELSEWHERE: ICD-10-CM

## 2017-05-31 DIAGNOSIS — E78.5 HYPERLIPIDEMIA, UNSPECIFIED: ICD-10-CM

## 2017-05-31 DIAGNOSIS — Z79.82 LONG TERM (CURRENT) USE OF ASPIRIN: ICD-10-CM

## 2017-05-31 DIAGNOSIS — J47.0 BRONCHIECTASIS WITH ACUTE LOWER RESPIRATORY INFECTION: ICD-10-CM

## 2017-05-31 DIAGNOSIS — R91.8 OTHER NONSPECIFIC ABNORMAL FINDING OF LUNG FIELD: ICD-10-CM

## 2017-05-31 DIAGNOSIS — F31.9 BIPOLAR DISORDER, UNSPECIFIED: ICD-10-CM

## 2017-05-31 DIAGNOSIS — B97.10 UNSPECIFIED ENTEROVIRUS AS THE CAUSE OF DISEASES CLASSIFIED ELSEWHERE: ICD-10-CM

## 2017-06-01 ENCOUNTER — APPOINTMENT (OUTPATIENT)
Dept: CARDIOLOGY | Facility: CLINIC | Age: 72
End: 2017-06-01

## 2017-06-01 ENCOUNTER — NON-APPOINTMENT (OUTPATIENT)
Age: 72
End: 2017-06-01

## 2017-06-01 VITALS
DIASTOLIC BLOOD PRESSURE: 72 MMHG | HEIGHT: 74 IN | BODY MASS INDEX: 21.56 KG/M2 | SYSTOLIC BLOOD PRESSURE: 124 MMHG | HEART RATE: 63 BPM | WEIGHT: 168 LBS | OXYGEN SATURATION: 98 %

## 2017-06-01 DIAGNOSIS — E78.5 HYPERLIPIDEMIA, UNSPECIFIED: ICD-10-CM

## 2017-06-02 PROBLEM — E78.5 DYSLIPIDEMIA: Status: ACTIVE | Noted: 2017-06-02

## 2017-12-07 ENCOUNTER — APPOINTMENT (OUTPATIENT)
Dept: CARDIOLOGY | Facility: CLINIC | Age: 72
End: 2017-12-07

## 2018-05-28 ENCOUNTER — RX RENEWAL (OUTPATIENT)
Age: 73
End: 2018-05-28

## 2019-05-28 ENCOUNTER — RX RENEWAL (OUTPATIENT)
Age: 74
End: 2019-05-28

## 2019-07-19 PROBLEM — I21.3 ST ELEVATION (STEMI) MYOCARDIAL INFARCTION OF UNSPECIFIED SITE: Chronic | Status: ACTIVE | Noted: 2017-05-24

## 2019-07-29 ENCOUNTER — MEDICATION RENEWAL (OUTPATIENT)
Age: 74
End: 2019-07-29

## 2019-09-04 ENCOUNTER — NON-APPOINTMENT (OUTPATIENT)
Age: 74
End: 2019-09-04

## 2019-09-04 ENCOUNTER — APPOINTMENT (OUTPATIENT)
Dept: CARDIOLOGY | Facility: CLINIC | Age: 74
End: 2019-09-04
Payer: MEDICARE

## 2019-09-04 VITALS
HEART RATE: 60 BPM | RESPIRATION RATE: 16 BRPM | SYSTOLIC BLOOD PRESSURE: 125 MMHG | BODY MASS INDEX: 21.56 KG/M2 | HEIGHT: 74 IN | WEIGHT: 168 LBS | DIASTOLIC BLOOD PRESSURE: 71 MMHG | OXYGEN SATURATION: 97 %

## 2019-09-04 PROCEDURE — 93000 ELECTROCARDIOGRAM COMPLETE: CPT

## 2019-09-04 PROCEDURE — 99213 OFFICE O/P EST LOW 20 MIN: CPT

## 2019-09-04 NOTE — HISTORY OF PRESENT ILLNESS
[FreeTextEntry1] : Doing okay. Has been seeing a psychiatrist for symptoms of lack of desire to do things. Denies chest pain, shortness of breath or palpitations.

## 2019-09-04 NOTE — PHYSICAL EXAM
[General Appearance - Well Developed] : well developed [Normal Appearance] : normal appearance [Well Groomed] : well groomed [General Appearance - Well Nourished] : well nourished [No Deformities] : no deformities [General Appearance - In No Acute Distress] : no acute distress [Normal Conjunctiva] : the conjunctiva exhibited no abnormalities [Eyelids - No Xanthelasma] : the eyelids demonstrated no xanthelasmas [Normal Oral Mucosa] : normal oral mucosa [No Oral Pallor] : no oral pallor [No Oral Cyanosis] : no oral cyanosis [Respiration, Rhythm And Depth] : normal respiratory rhythm and effort [Exaggerated Use Of Accessory Muscles For Inspiration] : no accessory muscle use [Heart Rate And Rhythm] : heart rate and rhythm were normal [Auscultation Breath Sounds / Voice Sounds] : lungs were clear to auscultation bilaterally [Murmurs] : no murmurs present [Heart Sounds] : normal S1 and S2 [Abdomen Soft] : soft [Edema] : no peripheral edema present [Gait - Sufficient For Exercise Testing] : the gait was sufficient for exercise testing [Abdomen Tenderness] : non-tender [Abnormal Walk] : normal gait [Cyanosis, Localized] : no localized cyanosis [Skin Color & Pigmentation] : normal skin color and pigmentation [] : no rash [No Venous Stasis] : no venous stasis [Mood] : the mood was normal [Oriented To Time, Place, And Person] : oriented to person, place, and time [Affect] : the affect was normal [No Anxiety] : not feeling anxious [FreeTextEntry1] : no carotid bruits or JVD

## 2019-09-04 NOTE — DISCUSSION/SUMMARY
[Coronary Artery Disease] : coronary artery disease [Stable] : stable [FreeTextEntry1] : \par Currently stable from a cardiovascular standpoint. Normotensive. Euvolemic. Stable CAD (prior RCA stent). No ischemic or CHF symptoms. Continue current medications. ECG completed today and reviewed (findings as noted above). Follow up in 6 months. Plan for stress testing after our next visit for risk stratification.

## 2020-02-05 ENCOUNTER — APPOINTMENT (OUTPATIENT)
Dept: CARDIOLOGY | Facility: CLINIC | Age: 75
End: 2020-02-05
Payer: MEDICARE

## 2020-02-05 ENCOUNTER — NON-APPOINTMENT (OUTPATIENT)
Age: 75
End: 2020-02-05

## 2020-02-05 VITALS
BODY MASS INDEX: 20.28 KG/M2 | HEIGHT: 74 IN | HEART RATE: 65 BPM | OXYGEN SATURATION: 100 % | SYSTOLIC BLOOD PRESSURE: 135 MMHG | WEIGHT: 158 LBS | DIASTOLIC BLOOD PRESSURE: 57 MMHG

## 2020-02-05 PROCEDURE — 99213 OFFICE O/P EST LOW 20 MIN: CPT

## 2020-02-05 PROCEDURE — 93000 ELECTROCARDIOGRAM COMPLETE: CPT

## 2020-02-05 RX ORDER — ARIPIPRAZOLE 2 MG/1
2 TABLET ORAL DAILY
Refills: 0 | Status: DISCONTINUED | COMMUNITY
End: 2020-02-05

## 2020-02-06 NOTE — DISCUSSION/SUMMARY
[Coronary Artery Disease] : coronary artery disease [Stable] : stable [FreeTextEntry1] : \par Currently stable from a cardiovascular standpoint. Normotensive. Euvolemic. Stable CAD with preserved LV systolic function. Exertional fatigue likely secondary to physical conditioning. Continue current medications. ECG completed today and reviewed (findings as noted above). Follow up in 6 months.

## 2020-02-06 NOTE — PHYSICAL EXAM
[General Appearance - Well Developed] : well developed [Normal Appearance] : normal appearance [Well Groomed] : well groomed [General Appearance - Well Nourished] : well nourished [No Deformities] : no deformities [General Appearance - In No Acute Distress] : no acute distress [Normal Conjunctiva] : the conjunctiva exhibited no abnormalities [Eyelids - No Xanthelasma] : the eyelids demonstrated no xanthelasmas [Normal Oral Mucosa] : normal oral mucosa [No Oral Pallor] : no oral pallor [No Oral Cyanosis] : no oral cyanosis [Respiration, Rhythm And Depth] : normal respiratory rhythm and effort [Exaggerated Use Of Accessory Muscles For Inspiration] : no accessory muscle use [Heart Rate And Rhythm] : heart rate and rhythm were normal [Auscultation Breath Sounds / Voice Sounds] : lungs were clear to auscultation bilaterally [Heart Sounds] : normal S1 and S2 [Murmurs] : no murmurs present [Edema] : no peripheral edema present [Abdomen Soft] : soft [Abdomen Tenderness] : non-tender [Abnormal Walk] : normal gait [Gait - Sufficient For Exercise Testing] : the gait was sufficient for exercise testing [Cyanosis, Localized] : no localized cyanosis [Skin Color & Pigmentation] : normal skin color and pigmentation [] : no rash [No Venous Stasis] : no venous stasis [Oriented To Time, Place, And Person] : oriented to person, place, and time [Affect] : the affect was normal [Mood] : the mood was normal [No Anxiety] : not feeling anxious [FreeTextEntry1] : no carotid bruits or JVD

## 2020-02-06 NOTE — HISTORY OF PRESENT ILLNESS
[FreeTextEntry1] : Doing okay. Feels tired after exerting himself. Denies chest pain, shortness of breath or palpitations.

## 2020-03-18 ENCOUNTER — RX RENEWAL (OUTPATIENT)
Age: 75
End: 2020-03-18

## 2020-09-16 ENCOUNTER — APPOINTMENT (OUTPATIENT)
Dept: CARDIOLOGY | Facility: CLINIC | Age: 75
End: 2020-09-16
Payer: MEDICARE

## 2020-09-16 ENCOUNTER — NON-APPOINTMENT (OUTPATIENT)
Age: 75
End: 2020-09-16

## 2020-09-16 VITALS
BODY MASS INDEX: 19.89 KG/M2 | SYSTOLIC BLOOD PRESSURE: 116 MMHG | OXYGEN SATURATION: 100 % | RESPIRATION RATE: 16 BRPM | DIASTOLIC BLOOD PRESSURE: 59 MMHG | WEIGHT: 155 LBS | HEIGHT: 74 IN | HEART RATE: 60 BPM

## 2020-09-16 PROCEDURE — 93000 ELECTROCARDIOGRAM COMPLETE: CPT

## 2020-09-16 PROCEDURE — 99213 OFFICE O/P EST LOW 20 MIN: CPT

## 2020-09-16 NOTE — PHYSICAL EXAM
[General Appearance - Well Developed] : well developed [Well Groomed] : well groomed [Normal Appearance] : normal appearance [General Appearance - Well Nourished] : well nourished [No Deformities] : no deformities [General Appearance - In No Acute Distress] : no acute distress [Normal Conjunctiva] : the conjunctiva exhibited no abnormalities [Eyelids - No Xanthelasma] : the eyelids demonstrated no xanthelasmas [Normal Oral Mucosa] : normal oral mucosa [No Oral Pallor] : no oral pallor [FreeTextEntry1] : no carotid bruits or JVD [No Oral Cyanosis] : no oral cyanosis [Respiration, Rhythm And Depth] : normal respiratory rhythm and effort [Exaggerated Use Of Accessory Muscles For Inspiration] : no accessory muscle use [Auscultation Breath Sounds / Voice Sounds] : lungs were clear to auscultation bilaterally [Heart Rate And Rhythm] : heart rate and rhythm were normal [Murmurs] : no murmurs present [Heart Sounds] : normal S1 and S2 [Edema] : no peripheral edema present [Abdomen Soft] : soft [Abdomen Tenderness] : non-tender [Gait - Sufficient For Exercise Testing] : the gait was sufficient for exercise testing [Abnormal Walk] : normal gait [Cyanosis, Localized] : no localized cyanosis [Skin Color & Pigmentation] : normal skin color and pigmentation [] : no rash [No Venous Stasis] : no venous stasis [Mood] : the mood was normal [Affect] : the affect was normal [Oriented To Time, Place, And Person] : oriented to person, place, and time [No Anxiety] : not feeling anxious

## 2020-09-16 NOTE — DISCUSSION/SUMMARY
[Coronary Artery Disease] : coronary artery disease [Stable] : stable [FreeTextEntry1] : \par Currently stable from a cardiovascular standpoint. Normotensive. Euvolemic. Stable CAD. Asymptomatic. Continue current medications. ECG completed today and reviewed. Follow up in 6 months.

## 2021-01-05 NOTE — PROGRESS NOTE ADULT - PROBLEM SELECTOR PROBLEM 1
[facility]  [address]  [facility] [address]  Phone: [phone]  Fax: [phone]                                    Ramirez Leslie   MRN: [id number]    Department: [facility]   Date of Visit: 1/5/2021           After Visit Summary Signature Page    I have received my discharge instructions, and my questions have been answered. I have discussed any challenges I see with this plan with the nurse or doctor.    ..........................................................................................................................................  Patient/Patient Representative Signature      ..........................................................................................................................................  Patient Representative Print Name and Relationship to Patient    ..................................................               ................................................  Date                                   Time    ..........................................................................................................................................  Reviewed by Signature/Title    ...................................................              ..............................................  Date                                               Time          22EPIC Rev 08/18        PNA (pneumonia)

## 2021-03-13 ENCOUNTER — RX RENEWAL (OUTPATIENT)
Age: 76
End: 2021-03-13

## 2021-03-16 ENCOUNTER — RESULT REVIEW (OUTPATIENT)
Age: 76
End: 2021-03-16

## 2021-03-24 ENCOUNTER — NON-APPOINTMENT (OUTPATIENT)
Age: 76
End: 2021-03-24

## 2021-03-24 ENCOUNTER — APPOINTMENT (OUTPATIENT)
Dept: CARDIOLOGY | Facility: CLINIC | Age: 76
End: 2021-03-24
Payer: MEDICARE

## 2021-03-24 VITALS
DIASTOLIC BLOOD PRESSURE: 60 MMHG | HEART RATE: 64 BPM | TEMPERATURE: 96.9 F | SYSTOLIC BLOOD PRESSURE: 118 MMHG | BODY MASS INDEX: 20.41 KG/M2 | OXYGEN SATURATION: 100 % | HEIGHT: 74 IN

## 2021-03-24 VITALS — BODY MASS INDEX: 20.41 KG/M2 | WEIGHT: 159 LBS

## 2021-03-24 DIAGNOSIS — U07.1 COVID-19: ICD-10-CM

## 2021-03-24 PROCEDURE — 93000 ELECTROCARDIOGRAM COMPLETE: CPT

## 2021-03-24 PROCEDURE — 99213 OFFICE O/P EST LOW 20 MIN: CPT

## 2021-03-24 RX ORDER — FERROUS SULFATE TAB EC 325 MG (65 MG FE EQUIVALENT) 325 (65 FE) MG
325 (65 FE) TABLET DELAYED RESPONSE ORAL DAILY
Refills: 0 | Status: ACTIVE | COMMUNITY

## 2021-03-24 NOTE — DISCUSSION/SUMMARY
[Coronary Artery Disease] : coronary artery disease [Stable] : stable [FreeTextEntry1] : \par Currently stable from a cardiovascular standpoint. Normotensive. Euvolemic. Stable CAD (s/p mid RCA stent) with preserved LV systolic function. Asymptomatic. Continue current medications. ECG completed today and reviewed. Follow up in 6 months. Will plan for cardiac testing for risk stratification after our next visit.

## 2021-03-24 NOTE — HISTORY OF PRESENT ILLNESS
[FreeTextEntry1] : Doing well. Denies chest pain, shortness of breath or palpitations. Had asymptomatic COVID. Diagnosed with low iron and anemia. Started taking iron daily.

## 2021-04-07 ENCOUNTER — APPOINTMENT (OUTPATIENT)
Dept: DISASTER EMERGENCY | Facility: OTHER | Age: 76
End: 2021-04-07

## 2021-04-28 ENCOUNTER — APPOINTMENT (OUTPATIENT)
Dept: DISASTER EMERGENCY | Facility: OTHER | Age: 76
End: 2021-04-28
Payer: MEDICARE

## 2021-04-28 PROCEDURE — 0002A: CPT

## 2021-10-06 ENCOUNTER — APPOINTMENT (OUTPATIENT)
Dept: CARDIOLOGY | Facility: CLINIC | Age: 76
End: 2021-10-06

## 2022-03-01 NOTE — H&P ADULT - PROBLEM/PLAN-3
[de-identified] : DEMIAN MATHIAS is a 13 year old, female seen on 03/01/2022 for    CVID.\par \par Pt had COVID19 in Jan 4, 2021 as well as rhinovorius. \par \par Pt was treated for pneumonia in Sept 2021 and has episodic infections every 3-4 weeks, usually requriign antibiotics.  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   \par \par Pt was treated for bronchitis by PMD around 2/15/2021 due to fever.  Treated with Amoxil x 7 days, and another Rx for 7 days.  \par \par Last IVIG was 2/14/22, due on 3/9/22\par \par Symptoms seem to include: fatigue, and overall exaustion.  Having a hard time getting out of bed daily. Not eating much. \par Pt has increased fatigue, and decreased PO intake.  Some days she 'just doesn't feel like eating' and just sleeps mostly. \par After an infusion, no longer does well for very long.  After 1 weeks, she has generlized malaise, body aches.\par \par Doing well with home school x 3 mo (Dec 2021 to present). Honor role again. \par \par She has been getting various URIs since November on and off, despite IgG levels above 800 mg/dl. \par \par Pt has incomplete bladder emptying with urinary reflex and hydronephrois (R).  Pt is following with Dr. Elmore but no followup recently. Pain on right side goign to back. \par \par Previously, the pt was doing great on IVIG for several years without symptoms and things have progressively detereated over the past 3 years.  \par \par F/U scheduled with GI and Peds Urology but they no showed to both appts in early Jan.   \par \par Pt had 2 doses of COVID19 pfizer. No booster yet. COVID19 in Jan 2022. Pt developed cough, nasal congestion, muscle pain, no headache, no fever.  Symtpoms have peristed and neigher improved or worstened.  No effect with receiving IVIG on 1/3/2021 so far.  Pt  was diagnosed with COVID19 at this time (1/4/2022) and has laregely reveocvered with no more cough or nasal congestion or headache.  Muscle pain and body aches persists (and existed prior to COVDI19). \par \par Bowel movement: every other day, diahrea
DISPLAY PLAN FREE TEXT

## 2022-05-11 ENCOUNTER — NON-APPOINTMENT (OUTPATIENT)
Age: 77
End: 2022-05-11

## 2022-05-18 ENCOUNTER — APPOINTMENT (OUTPATIENT)
Dept: ORTHOPEDIC SURGERY | Facility: CLINIC | Age: 77
End: 2022-05-18
Payer: MEDICARE

## 2022-05-18 VITALS — HEIGHT: 74 IN | BODY MASS INDEX: 20.41 KG/M2 | WEIGHT: 159 LBS

## 2022-05-18 DIAGNOSIS — M48.07 SPINAL STENOSIS, LUMBOSACRAL REGION: ICD-10-CM

## 2022-05-18 DIAGNOSIS — M43.16 SPONDYLOLISTHESIS, LUMBAR REGION: ICD-10-CM

## 2022-05-18 DIAGNOSIS — M51.36 OTHER INTERVERTEBRAL DISC DEGENERATION, LUMBAR REGION: ICD-10-CM

## 2022-05-18 PROCEDURE — 72100 X-RAY EXAM L-S SPINE 2/3 VWS: CPT

## 2022-05-18 PROCEDURE — 99204 OFFICE O/P NEW MOD 45 MIN: CPT

## 2023-02-01 ENCOUNTER — NON-APPOINTMENT (OUTPATIENT)
Age: 78
End: 2023-02-01

## 2023-02-01 ENCOUNTER — APPOINTMENT (OUTPATIENT)
Dept: CARDIOLOGY | Facility: CLINIC | Age: 78
End: 2023-02-01
Payer: MEDICARE

## 2023-02-01 VITALS
BODY MASS INDEX: 19.38 KG/M2 | WEIGHT: 151 LBS | HEIGHT: 74 IN | SYSTOLIC BLOOD PRESSURE: 151 MMHG | HEART RATE: 61 BPM | OXYGEN SATURATION: 95 % | DIASTOLIC BLOOD PRESSURE: 74 MMHG

## 2023-02-01 DIAGNOSIS — I25.10 ATHEROSCLEROTIC HEART DISEASE OF NATIVE CORONARY ARTERY W/OUT ANGINA PECTORIS: ICD-10-CM

## 2023-02-01 DIAGNOSIS — R60.9 EDEMA, UNSPECIFIED: ICD-10-CM

## 2023-02-01 DIAGNOSIS — I10 ESSENTIAL (PRIMARY) HYPERTENSION: ICD-10-CM

## 2023-02-01 PROCEDURE — 93000 ELECTROCARDIOGRAM COMPLETE: CPT

## 2023-02-01 PROCEDURE — 99214 OFFICE O/P EST MOD 30 MIN: CPT

## 2023-02-02 PROBLEM — R60.9 PERIPHERAL EDEMA: Status: ACTIVE | Noted: 2023-02-01

## 2023-02-02 NOTE — DISCUSSION/SUMMARY
[Coronary Artery Disease] : coronary artery disease [Stable] : stable [Hypertension] : hypertension [Low Sodium Diet] : low sodium diet [FreeTextEntry1] : \par Currently stable from a cardiovascular standpoint. Hypertensive today. Appears to be in mild volume overload. Stable CAD (s/p mid RCA stent) with preserved LV systolic function. No ischemic symptoms. Advised patient to increase triamterene to daily. Continue all other current medications. ECG completed today and reviewed. Will schedule an echocardiogram to assess his cardiac structures and function. Pending test results, I will make further recommendations. Follow up in 2 months. [EKG obtained to assist in diagnosis and management of assessed problem(s)] : EKG obtained to assist in diagnosis and management of assessed problem(s)

## 2023-02-02 NOTE — PHYSICAL EXAM
[Well Developed] : well developed [Well Nourished] : well nourished [No Acute Distress] : no acute distress [Normal Conjunctiva] : normal conjunctiva [Normal Venous Pressure] : normal venous pressure [No Carotid Bruit] : no carotid bruit [Normal S1, S2] : normal S1, S2 [No Murmur] : no murmur [No Rub] : no rub [No Gallop] : no gallop [Good Air Entry] : good air entry [No Respiratory Distress] : no respiratory distress  [Soft] : abdomen soft [Non Tender] : non-tender [Normal Gait] : normal gait [No Cyanosis] : no cyanosis [No Rash] : no rash [No Skin Lesions] : no skin lesions [Moves all extremities] : moves all extremities [No Focal Deficits] : no focal deficits [Normal Speech] : normal speech [Alert and Oriented] : alert and oriented [de-identified] : decreased breath sounds at the bases [de-identified] : bilateral trace to 1+ pitting edema

## 2023-02-02 NOTE — HISTORY OF PRESENT ILLNESS
[FreeTextEntry1] : Doing okay. Denies chest pain, shortness of breath or palpitations. Developed some leg swelling in the past couple weeks. Started on triamterene 25 mg every other day for BP and for leg swelling. Has been having some left hip and left calf pains.

## 2023-02-17 ENCOUNTER — TRANSCRIPTION ENCOUNTER (OUTPATIENT)
Age: 78
End: 2023-02-17

## 2023-07-24 ENCOUNTER — RX ONLY (RX ONLY)
Age: 78
End: 2023-07-24

## 2023-07-24 ENCOUNTER — OFFICE (OUTPATIENT)
Dept: URBAN - METROPOLITAN AREA CLINIC 109 | Facility: CLINIC | Age: 78
Setting detail: OPHTHALMOLOGY
End: 2023-07-24
Payer: MEDICARE

## 2023-07-24 DIAGNOSIS — H25.13: ICD-10-CM

## 2023-07-24 PROCEDURE — 99204 OFFICE O/P NEW MOD 45 MIN: CPT | Performed by: OPHTHALMOLOGY

## 2023-07-24 ASSESSMENT — REFRACTION_AUTOREFRACTION
OD_CYLINDER: -1.00
OD_SPHERE: -0.50
OS_SPHERE: -2.25
OS_CYLINDER: -1.25
OD_AXIS: 108
OS_AXIS: 111

## 2023-07-24 ASSESSMENT — REFRACTION_CURRENTRX
OS_CYLINDER: -0.50
OD_CYLINDER: -0.50
OD_SPHERE: -1.00
OS_OVR_VA: 20/
OD_OVR_VA: 20/
OS_SPHERE: -2.25
OS_VPRISM_DIRECTION: SV
OD_VPRISM_DIRECTION: SV
OS_AXIS: 139
OD_AXIS: 135

## 2023-07-24 ASSESSMENT — TONOMETRY
OD_IOP_MMHG: 14
OS_IOP_MMHG: 14

## 2023-07-24 ASSESSMENT — CONFRONTATIONAL VISUAL FIELD TEST (CVF)
OD_FINDINGS: FULL
OS_FINDINGS: FULL

## 2023-07-24 ASSESSMENT — SPHEQUIV_DERIVED
OD_SPHEQUIV: -1
OS_SPHEQUIV: -2.875

## 2023-07-24 ASSESSMENT — VISUAL ACUITY
OD_BCVA: 20/300
OS_BCVA: 20/40-2

## 2023-08-16 ENCOUNTER — OFFICE (OUTPATIENT)
Dept: URBAN - METROPOLITAN AREA CLINIC 109 | Facility: CLINIC | Age: 78
Setting detail: OPHTHALMOLOGY
End: 2023-08-16
Payer: MEDICARE

## 2023-08-16 DIAGNOSIS — H25.12: ICD-10-CM

## 2023-08-16 DIAGNOSIS — H25.13: ICD-10-CM

## 2023-08-16 PROCEDURE — 92136 OPHTHALMIC BIOMETRY: CPT | Performed by: OPHTHALMOLOGY

## 2023-08-16 PROCEDURE — 99213 OFFICE O/P EST LOW 20 MIN: CPT | Performed by: OPHTHALMOLOGY

## 2023-08-16 ASSESSMENT — KERATOMETRY
OS_K2POWER_DIOPTERS: 42.87
OD_K2POWER_DIOPTERS: 42.37
OS_AXISANGLE_DEGREES: 178
OS_K1K2_AVERAGE: 42.31
OD_K1K2_AVERAGE: 41.81
OS_K2POWER_DIOPTERS: 42.87
OD_AXISANGLE_DEGREES: 004
OD_K1POWER_DIOPTERS: 41.25
OD_AXISANGLE2_DEGREES: 004
OS_CYLAXISANGLE_DEGREES: 178
OS_AXISANGLE_DEGREES: 178
OS_K1POWER_DIOPTERS: 41.75
OD_K2POWER_DIOPTERS: 42.37
OD_CYLPOWER_DEGREES: 1.11999999
OS_AXISANGLE2_DEGREES: 178
OD_AXISANGLE_DEGREES: 004
OD_CYLAXISANGLE_DEGREES: 4
OS_K1POWER_DIOPTERS: 41.75
OS_CYLPOWER_DEGREES: 1.11999999
OD_K1POWER_DIOPTERS: 41.25

## 2023-08-16 ASSESSMENT — REFRACTION_CURRENTRX
OS_AXIS: 139
OD_VPRISM_DIRECTION: SV
OD_CYLINDER: -0.50
OS_VPRISM_DIRECTION: SV
OS_SPHERE: -2.25
OD_OVR_VA: 20/
OD_SPHERE: -1.00
OD_AXIS: 135
OS_OVR_VA: 20/
OS_CYLINDER: -0.50

## 2023-08-16 ASSESSMENT — REFRACTION_AUTOREFRACTION
OD_SPHERE: 0.00
OS_AXIS: 111
OD_CYLINDER: -1.25
OS_CYLINDER: -1.50
OD_AXIS: 106
OS_SPHERE: -2.75

## 2023-08-16 ASSESSMENT — VISUAL ACUITY
OS_BCVA: 20/40-2
OD_BCVA: 20/300

## 2023-08-16 ASSESSMENT — AXIALLENGTH_DERIVED
OS_AL: 25.54
OD_AL: 24.489

## 2023-08-16 ASSESSMENT — SPHEQUIV_DERIVED
OS_SPHEQUIV: -3.5
OD_SPHEQUIV: -0.625

## 2023-08-16 ASSESSMENT — CONFRONTATIONAL VISUAL FIELD TEST (CVF)
OS_FINDINGS: FULL
OD_FINDINGS: FULL

## 2023-08-29 ENCOUNTER — AMBULATORY SURGERY CENTER (OUTPATIENT)
Dept: URBAN - METROPOLITAN AREA SURGERY 19 | Facility: SURGERY | Age: 78
Setting detail: OPHTHALMOLOGY
End: 2023-08-29
Payer: MEDICARE

## 2023-08-29 DIAGNOSIS — H25.12: ICD-10-CM

## 2023-08-29 PROCEDURE — 66984 XCAPSL CTRC RMVL W/O ECP: CPT | Performed by: OPHTHALMOLOGY

## 2023-08-30 ENCOUNTER — RX ONLY (RX ONLY)
Age: 78
End: 2023-08-30

## 2023-08-30 ENCOUNTER — OFFICE (OUTPATIENT)
Dept: URBAN - METROPOLITAN AREA CLINIC 109 | Facility: CLINIC | Age: 78
Setting detail: OPHTHALMOLOGY
End: 2023-08-30
Payer: MEDICARE

## 2023-08-30 DIAGNOSIS — Z96.1: ICD-10-CM

## 2023-08-30 PROCEDURE — 99024 POSTOP FOLLOW-UP VISIT: CPT | Performed by: OPHTHALMOLOGY

## 2023-08-30 ASSESSMENT — KERATOMETRY
OS_AXISANGLE_DEGREES: 178
OD_AXISANGLE_DEGREES: 004
OS_K2POWER_DIOPTERS: 42.87
OS_K1POWER_DIOPTERS: 41.75
OD_K1POWER_DIOPTERS: 41.25
OD_K2POWER_DIOPTERS: 42.37

## 2023-08-30 ASSESSMENT — SPHEQUIV_DERIVED
OS_SPHEQUIV: -3.5
OD_SPHEQUIV: -0.625

## 2023-08-30 ASSESSMENT — TONOMETRY: OS_IOP_MMHG: 19

## 2023-08-30 ASSESSMENT — VISUAL ACUITY
OS_BCVA: 20/40-2
OD_BCVA: 20/30

## 2023-08-30 ASSESSMENT — REFRACTION_CURRENTRX
OD_VPRISM_DIRECTION: SV
OS_SPHERE: -2.25
OD_SPHERE: -1.00
OS_VPRISM_DIRECTION: SV
OS_OVR_VA: 20/
OD_AXIS: 135
OD_OVR_VA: 20/
OD_CYLINDER: -0.50
OS_CYLINDER: -0.50
OS_AXIS: 139

## 2023-08-30 ASSESSMENT — CONFRONTATIONAL VISUAL FIELD TEST (CVF)
OD_FINDINGS: FULL
OS_FINDINGS: FULL

## 2023-08-30 ASSESSMENT — REFRACTION_AUTOREFRACTION
OD_AXIS: 106
OS_CYLINDER: -1.50
OS_SPHERE: -2.75
OD_SPHERE: 0.00
OD_CYLINDER: -1.25
OS_AXIS: 111

## 2023-08-30 ASSESSMENT — AXIALLENGTH_DERIVED
OD_AL: 24.489
OS_AL: 25.54

## 2023-09-06 ENCOUNTER — OFFICE (OUTPATIENT)
Dept: URBAN - METROPOLITAN AREA CLINIC 109 | Facility: CLINIC | Age: 78
Setting detail: OPHTHALMOLOGY
End: 2023-09-06
Payer: MEDICARE

## 2023-09-06 DIAGNOSIS — H25.11: ICD-10-CM

## 2023-09-06 DIAGNOSIS — Z96.1: ICD-10-CM

## 2023-09-06 PROCEDURE — 92136 OPHTHALMIC BIOMETRY: CPT | Performed by: OPHTHALMOLOGY

## 2023-09-06 PROCEDURE — 99024 POSTOP FOLLOW-UP VISIT: CPT | Performed by: OPHTHALMOLOGY

## 2023-09-06 ASSESSMENT — AXIALLENGTH_DERIVED
OS_AL: 23.9206
OD_AL: 24.4693

## 2023-09-06 ASSESSMENT — CONFRONTATIONAL VISUAL FIELD TEST (CVF)
OD_FINDINGS: FULL
OS_FINDINGS: FULL

## 2023-09-06 ASSESSMENT — REFRACTION_CURRENTRX
OS_AXIS: 139
OS_VPRISM_DIRECTION: SV
OD_AXIS: 135
OS_SPHERE: -2.25
OD_OVR_VA: 20/
OD_CYLINDER: -0.50
OD_SPHERE: -1.00
OD_VPRISM_DIRECTION: SV
OS_OVR_VA: 20/
OS_CYLINDER: -0.50

## 2023-09-06 ASSESSMENT — KERATOMETRY
OS_AXISANGLE_DEGREES: 083
OD_AXISANGLE_DEGREES: 015
OD_K2POWER_DIOPTERS: 42.50
OS_K2POWER_DIOPTERS: 43.00
OD_K1POWER_DIOPTERS: 41.75
OS_K1POWER_DIOPTERS: 42.50

## 2023-09-06 ASSESSMENT — REFRACTION_AUTOREFRACTION
OS_CYLINDER: -0.75
OD_SPHERE: -0.50
OD_AXIS: 136
OS_SPHERE: +0.25
OS_AXIS: 075
OD_CYLINDER: -0.75

## 2023-09-06 ASSESSMENT — VISUAL ACUITY
OS_BCVA: 20/40-2
OD_BCVA: 20/20-2

## 2023-09-06 ASSESSMENT — SPHEQUIV_DERIVED
OD_SPHEQUIV: -0.875
OS_SPHEQUIV: -0.125

## 2023-09-06 ASSESSMENT — TONOMETRY
OD_IOP_MMHG: 11
OS_IOP_MMHG: 12

## 2023-09-12 ENCOUNTER — AMBULATORY SURGERY CENTER (OUTPATIENT)
Dept: URBAN - METROPOLITAN AREA SURGERY 19 | Facility: SURGERY | Age: 78
Setting detail: OPHTHALMOLOGY
End: 2023-09-12
Payer: MEDICARE

## 2023-09-12 DIAGNOSIS — H25.11: ICD-10-CM

## 2023-09-12 PROCEDURE — 66984 XCAPSL CTRC RMVL W/O ECP: CPT | Performed by: OPHTHALMOLOGY

## 2023-09-13 ENCOUNTER — RX ONLY (RX ONLY)
Age: 78
End: 2023-09-13

## 2023-09-13 ENCOUNTER — OFFICE (OUTPATIENT)
Dept: URBAN - METROPOLITAN AREA CLINIC 109 | Facility: CLINIC | Age: 78
Setting detail: OPHTHALMOLOGY
End: 2023-09-13
Payer: MEDICARE

## 2023-09-13 DIAGNOSIS — Z96.1: ICD-10-CM

## 2023-09-13 PROCEDURE — 99024 POSTOP FOLLOW-UP VISIT: CPT | Performed by: OPHTHALMOLOGY

## 2023-09-13 ASSESSMENT — REFRACTION_AUTOREFRACTION
OD_SPHERE: +1.25
OD_AXIS: 111
OD_CYLINDER: -0.75
OS_SPHERE: +0.25
OS_CYLINDER: -0.50
OS_AXIS: 078

## 2023-09-13 ASSESSMENT — VISUAL ACUITY
OS_BCVA: 20/40
OD_BCVA: 20/25-1

## 2023-09-13 ASSESSMENT — REFRACTION_CURRENTRX
OS_CYLINDER: -0.50
OS_VPRISM_DIRECTION: SV
OD_CYLINDER: -0.50
OD_VPRISM_DIRECTION: SV
OS_SPHERE: -2.25
OS_OVR_VA: 20/
OD_OVR_VA: 20/
OS_AXIS: 139
OD_AXIS: 135
OD_SPHERE: -1.00

## 2023-09-13 ASSESSMENT — KERATOMETRY
OS_K2POWER_DIOPTERS: 42.25
OD_K2POWER_DIOPTERS: 42.25
OD_K1POWER_DIOPTERS: 41.50
OD_AXISANGLE_DEGREES: 027
OS_AXISANGLE_DEGREES: 090
OS_K1POWER_DIOPTERS: 42.25

## 2023-09-13 ASSESSMENT — AXIALLENGTH_DERIVED
OS_AL: 24.0602
OD_AL: 23.8507

## 2023-09-13 ASSESSMENT — SPHEQUIV_DERIVED
OS_SPHEQUIV: 0
OD_SPHEQUIV: 0.875

## 2023-09-13 ASSESSMENT — TONOMETRY
OD_IOP_MMHG: 16
OS_IOP_MMHG: 13

## 2023-09-13 ASSESSMENT — CONFRONTATIONAL VISUAL FIELD TEST (CVF)
OS_FINDINGS: FULL
OD_FINDINGS: FULL

## 2023-10-04 ENCOUNTER — OFFICE (OUTPATIENT)
Dept: URBAN - METROPOLITAN AREA CLINIC 109 | Facility: CLINIC | Age: 78
Setting detail: OPHTHALMOLOGY
End: 2023-10-04
Payer: MEDICARE

## 2023-10-04 DIAGNOSIS — Z96.1: ICD-10-CM

## 2023-10-04 PROCEDURE — 99024 POSTOP FOLLOW-UP VISIT: CPT | Performed by: OPHTHALMOLOGY

## 2023-10-04 ASSESSMENT — TONOMETRY: OD_IOP_MMHG: 11

## 2023-10-04 ASSESSMENT — REFRACTION_CURRENTRX
OD_VPRISM_DIRECTION: SV
OS_VPRISM_DIRECTION: SV
OD_CYLINDER: -0.50
OS_SPHERE: -2.25
OS_OVR_VA: 20/
OS_CYLINDER: -0.50
OD_OVR_VA: 20/
OD_AXIS: 135
OS_AXIS: 139
OD_SPHERE: -1.00

## 2023-10-04 ASSESSMENT — REFRACTION_MANIFEST
OD_VA1: 20/20
OD_AXIS: 115
OD_CYLINDER: -1.00
OD_SPHERE: PLANO
OD_ADD: +2.50
OS_VA1: 20/20
OS_AXIS: 95
OS_CYLINDER: -0.50
OS_SPHERE: PLANO
OS_ADD: +2.50

## 2023-10-04 ASSESSMENT — CONFRONTATIONAL VISUAL FIELD TEST (CVF)
OS_FINDINGS: FULL
OD_FINDINGS: FULL

## 2023-10-04 ASSESSMENT — SPHEQUIV_DERIVED
OD_SPHEQUIV: 0.25
OS_SPHEQUIV: -0.375

## 2023-10-04 ASSESSMENT — KERATOMETRY
OS_K2POWER_DIOPTERS: 42.25
OD_AXISANGLE_DEGREES: 027
OS_AXISANGLE_DEGREES: 090
OD_K2POWER_DIOPTERS: 42.25
OS_K1POWER_DIOPTERS: 42.25
OD_K1POWER_DIOPTERS: 41.50

## 2023-10-04 ASSESSMENT — REFRACTION_AUTOREFRACTION
OD_AXIS: 116
OS_SPHERE: -0.25
OS_CYLINDER: -0.25
OS_AXIS: 94
OD_CYLINDER: -1.00
OD_SPHERE: +0.75

## 2023-10-04 ASSESSMENT — VISUAL ACUITY
OS_BCVA: 20/30-2
OD_BCVA: 20/30-2

## 2023-10-04 ASSESSMENT — AXIALLENGTH_DERIVED
OD_AL: 24.1022
OS_AL: 24.2131

## 2024-01-26 ENCOUNTER — OFFICE (OUTPATIENT)
Dept: URBAN - METROPOLITAN AREA CLINIC 109 | Facility: CLINIC | Age: 79
Setting detail: OPHTHALMOLOGY
End: 2024-01-26
Payer: MEDICARE

## 2024-01-26 DIAGNOSIS — H43.813: ICD-10-CM

## 2024-01-26 PROCEDURE — 92201 OPSCPY EXTND RTA DRAW UNI/BI: CPT | Performed by: OPHTHALMOLOGY

## 2024-01-26 PROCEDURE — 92014 COMPRE OPH EXAM EST PT 1/>: CPT | Performed by: OPHTHALMOLOGY

## 2024-01-26 ASSESSMENT — REFRACTION_CURRENTRX
OD_AXIS: 135
OD_VPRISM_DIRECTION: SV
OD_CYLINDER: -0.50
OS_CYLINDER: -0.50
OS_SPHERE: -2.25
OS_AXIS: 139
OD_OVR_VA: 20/
OS_VPRISM_DIRECTION: SV
OS_OVR_VA: 20/
OD_SPHERE: -1.00

## 2024-01-26 ASSESSMENT — REFRACTION_AUTOREFRACTION
OD_SPHERE: +0.50
OS_AXIS: 86
OS_SPHERE: 0.00
OS_CYLINDER: -0.50
OD_AXIS: 109
OD_CYLINDER: -0.75

## 2024-01-26 ASSESSMENT — REFRACTION_MANIFEST
OD_ADD: +2.50
OD_SPHERE: PLANO
OD_VA1: 20/20
OS_ADD: +2.50
OD_CYLINDER: -1.00
OS_VA1: 20/20
OS_CYLINDER: -0.50
OS_SPHERE: PLANO
OS_AXIS: 95
OD_AXIS: 115

## 2024-01-26 ASSESSMENT — SPHEQUIV_DERIVED
OD_SPHEQUIV: 0.125
OS_SPHEQUIV: -0.25

## 2024-01-26 ASSESSMENT — CONFRONTATIONAL VISUAL FIELD TEST (CVF)
OS_FINDINGS: FULL
OD_FINDINGS: FULL

## 2024-09-24 ENCOUNTER — NON-APPOINTMENT (OUTPATIENT)
Age: 79
End: 2024-09-24

## 2024-09-25 ENCOUNTER — APPOINTMENT (OUTPATIENT)
Dept: CARDIOLOGY | Facility: CLINIC | Age: 79
End: 2024-09-25
Payer: MEDICARE

## 2024-09-25 ENCOUNTER — NON-APPOINTMENT (OUTPATIENT)
Age: 79
End: 2024-09-25

## 2024-09-25 VITALS
HEIGHT: 74 IN | DIASTOLIC BLOOD PRESSURE: 48 MMHG | SYSTOLIC BLOOD PRESSURE: 112 MMHG | WEIGHT: 144 LBS | OXYGEN SATURATION: 99 % | HEART RATE: 66 BPM | BODY MASS INDEX: 18.48 KG/M2

## 2024-09-25 DIAGNOSIS — I10 ESSENTIAL (PRIMARY) HYPERTENSION: ICD-10-CM

## 2024-09-25 DIAGNOSIS — R60.0 LOCALIZED EDEMA: ICD-10-CM

## 2024-09-25 DIAGNOSIS — U07.1 COVID-19: ICD-10-CM

## 2024-09-25 DIAGNOSIS — I25.10 ATHEROSCLEROTIC HEART DISEASE OF NATIVE CORONARY ARTERY W/OUT ANGINA PECTORIS: ICD-10-CM

## 2024-09-25 DIAGNOSIS — I73.9 PERIPHERAL VASCULAR DISEASE, UNSPECIFIED: ICD-10-CM

## 2024-09-25 PROCEDURE — 99214 OFFICE O/P EST MOD 30 MIN: CPT

## 2024-09-25 PROCEDURE — G2211 COMPLEX E/M VISIT ADD ON: CPT

## 2024-09-25 PROCEDURE — 93000 ELECTROCARDIOGRAM COMPLETE: CPT

## 2024-09-26 NOTE — REVIEW OF SYSTEMS
[Lower Ext Edema] : lower extremity edema [Negative] : Gastrointestinal [Leg Claudication] : intermittent leg claudication [SOB] : no shortness of breath [Dyspnea on exertion] : not dyspnea during exertion [Chest Discomfort] : no chest discomfort [Palpitations] : no palpitations [Orthopnea] : no orthopnea [PND] : no PND [Syncope] : no syncope

## 2024-09-26 NOTE — PHYSICAL EXAM
[Well Developed] : well developed [Well Nourished] : well nourished [No Acute Distress] : no acute distress [Normal Conjunctiva] : normal conjunctiva [Normal Venous Pressure] : normal venous pressure [No Carotid Bruit] : no carotid bruit [Normal S1, S2] : normal S1, S2 [No Murmur] : no murmur [No Rub] : no rub [No Gallop] : no gallop [Good Air Entry] : good air entry [No Respiratory Distress] : no respiratory distress  [Soft] : abdomen soft [Non Tender] : non-tender [Normal Gait] : normal gait [No Cyanosis] : no cyanosis [No Rash] : no rash [No Skin Lesions] : no skin lesions [Moves all extremities] : moves all extremities [No Focal Deficits] : no focal deficits [Normal Speech] : normal speech [Alert and Oriented] : alert and oriented [de-identified] : bilateral trace pitting edema [de-identified] : decreased breath sounds at the bases

## 2024-09-26 NOTE — HISTORY OF PRESENT ILLNESS
[FreeTextEntry1] : Currently doing okay. Denies chest pain, shortness of breath or palpitations. Complains of some legs pains when he walks which he states taking Tylenol helps. Overall, he is relatively sedentary.

## 2024-09-26 NOTE — CARDIOLOGY SUMMARY
[de-identified] : 09/25/24 - normal sinus rhythm, nonspecific ST abnormality  [de-identified] : 02/15/23 - aortic sclerosis, normal LA, normal LV and RV size and function, LVEF 60-65% 10/02/12 - normal LA, normal LV and RV size and function, LVEF 65%  [de-identified] : \par  04/15/12 (PCI) - RESOLUTE to mRCA 99%\par  04/15/12 (CATH) - pLAD 20%, oD1 50%, mRCA 99%, LVEDP 16 mmHg

## 2024-09-26 NOTE — PHYSICAL EXAM
[Well Developed] : well developed [Well Nourished] : well nourished [No Acute Distress] : no acute distress [Normal Conjunctiva] : normal conjunctiva [Normal Venous Pressure] : normal venous pressure [No Carotid Bruit] : no carotid bruit [Normal S1, S2] : normal S1, S2 [No Murmur] : no murmur [No Rub] : no rub [No Gallop] : no gallop [Good Air Entry] : good air entry [No Respiratory Distress] : no respiratory distress  [Soft] : abdomen soft [Non Tender] : non-tender [Normal Gait] : normal gait [No Cyanosis] : no cyanosis [No Rash] : no rash [No Skin Lesions] : no skin lesions [Moves all extremities] : moves all extremities [No Focal Deficits] : no focal deficits [Normal Speech] : normal speech [Alert and Oriented] : alert and oriented [de-identified] : bilateral trace pitting edema [de-identified] : decreased breath sounds at the bases

## 2024-09-26 NOTE — CARDIOLOGY SUMMARY
[de-identified] : 09/25/24 - normal sinus rhythm, nonspecific ST abnormality  [de-identified] : 02/15/23 - aortic sclerosis, normal LA, normal LV and RV size and function, LVEF 60-65% 10/02/12 - normal LA, normal LV and RV size and function, LVEF 65%  [de-identified] : \par  04/15/12 (PCI) - RESOLUTE to mRCA 99%\par  04/15/12 (CATH) - pLAD 20%, oD1 50%, mRCA 99%, LVEDP 16 mmHg

## 2024-09-26 NOTE — DISCUSSION/SUMMARY
[Coronary Artery Disease] : coronary artery disease [Stable] : stable [Hypertension] : hypertension [Low Sodium Diet] : low sodium diet [FreeTextEntry1] : Currently stable from a cardiovascular standpoint. Normotensive (low diastolic pressure). Appears to have some degree of dependent edema. Stable CAD (s/p mid RCA stent) with preserved LV systolic function (LVEF 60-65%). No ischemic symptoms. Continue current medications including aspirin and atorvastatin. ECG completed today and reviewed. Leg elevations and use of support stockings advised. Will schedule SAMANTHA/PVR to rule out any significant PAD given symptoms of leg pains with walking. Follow up in 6 months. Patient to follow up with PCP to discuss depression management. [EKG obtained to assist in diagnosis and management of assessed problem(s)] : EKG obtained to assist in diagnosis and management of assessed problem(s)

## 2024-11-11 ENCOUNTER — NON-APPOINTMENT (OUTPATIENT)
Age: 79
End: 2024-11-11

## 2024-12-23 ENCOUNTER — EMERGENCY (EMERGENCY)
Facility: HOSPITAL | Age: 79
LOS: 1 days | Discharge: ANOTHER HOSPITAL | End: 2024-12-23
Attending: STUDENT IN AN ORGANIZED HEALTH CARE EDUCATION/TRAINING PROGRAM | Admitting: STUDENT IN AN ORGANIZED HEALTH CARE EDUCATION/TRAINING PROGRAM
Payer: MEDICARE

## 2024-12-23 VITALS
WEIGHT: 145.51 LBS | TEMPERATURE: 98 F | DIASTOLIC BLOOD PRESSURE: 83 MMHG | RESPIRATION RATE: 16 BRPM | OXYGEN SATURATION: 98 % | HEIGHT: 72 IN | SYSTOLIC BLOOD PRESSURE: 182 MMHG | HEART RATE: 90 BPM

## 2024-12-23 VITALS
TEMPERATURE: 98 F | RESPIRATION RATE: 18 BRPM | SYSTOLIC BLOOD PRESSURE: 147 MMHG | OXYGEN SATURATION: 98 % | HEART RATE: 70 BPM | DIASTOLIC BLOOD PRESSURE: 77 MMHG

## 2024-12-23 LAB
ALBUMIN SERPL ELPH-MCNC: 2.6 G/DL — LOW (ref 3.3–5)
ALP SERPL-CCNC: 118 U/L — SIGNIFICANT CHANGE UP (ref 30–120)
ALT FLD-CCNC: 46 U/L — SIGNIFICANT CHANGE UP (ref 10–60)
ANION GAP SERPL CALC-SCNC: 5 MMOL/L — SIGNIFICANT CHANGE UP (ref 5–17)
APPEARANCE UR: CLEAR — SIGNIFICANT CHANGE UP
APTT BLD: 29.5 SEC — SIGNIFICANT CHANGE UP (ref 24.5–35.6)
AST SERPL-CCNC: 35 U/L — SIGNIFICANT CHANGE UP (ref 10–40)
BASOPHILS # BLD AUTO: 0.04 K/UL — SIGNIFICANT CHANGE UP (ref 0–0.2)
BASOPHILS NFR BLD AUTO: 0.5 % — SIGNIFICANT CHANGE UP (ref 0–2)
BILIRUB SERPL-MCNC: 0.6 MG/DL — SIGNIFICANT CHANGE UP (ref 0.2–1.2)
BILIRUB UR-MCNC: NEGATIVE — SIGNIFICANT CHANGE UP
BUN SERPL-MCNC: 20 MG/DL — SIGNIFICANT CHANGE UP (ref 7–23)
CALCIUM SERPL-MCNC: 9.2 MG/DL — SIGNIFICANT CHANGE UP (ref 8.4–10.5)
CHLORIDE SERPL-SCNC: 104 MMOL/L — SIGNIFICANT CHANGE UP (ref 96–108)
CO2 SERPL-SCNC: 30 MMOL/L — SIGNIFICANT CHANGE UP (ref 22–31)
COLOR SPEC: YELLOW — SIGNIFICANT CHANGE UP
CREAT SERPL-MCNC: 0.94 MG/DL — SIGNIFICANT CHANGE UP (ref 0.5–1.3)
DIFF PNL FLD: ABNORMAL
EGFR: 81 ML/MIN/1.73M2 — SIGNIFICANT CHANGE UP
EOSINOPHIL # BLD AUTO: 0.05 K/UL — SIGNIFICANT CHANGE UP (ref 0–0.5)
EOSINOPHIL NFR BLD AUTO: 0.7 % — SIGNIFICANT CHANGE UP (ref 0–6)
GLUCOSE SERPL-MCNC: 93 MG/DL — SIGNIFICANT CHANGE UP (ref 70–99)
GLUCOSE UR QL: 100 MG/DL
HCT VFR BLD CALC: 35.5 % — LOW (ref 39–50)
HGB BLD-MCNC: 11.9 G/DL — LOW (ref 13–17)
IMM GRANULOCYTES NFR BLD AUTO: 0.3 % — SIGNIFICANT CHANGE UP (ref 0–0.9)
INR BLD: 0.93 RATIO — SIGNIFICANT CHANGE UP (ref 0.85–1.16)
KETONES UR-MCNC: NEGATIVE MG/DL — SIGNIFICANT CHANGE UP
LEUKOCYTE ESTERASE UR-ACNC: ABNORMAL
LYMPHOCYTES # BLD AUTO: 0.51 K/UL — LOW (ref 1–3.3)
LYMPHOCYTES # BLD AUTO: 6.7 % — LOW (ref 13–44)
MCHC RBC-ENTMCNC: 32 PG — SIGNIFICANT CHANGE UP (ref 27–34)
MCHC RBC-ENTMCNC: 33.5 G/DL — SIGNIFICANT CHANGE UP (ref 32–36)
MCV RBC AUTO: 95.4 FL — SIGNIFICANT CHANGE UP (ref 80–100)
MONOCYTES # BLD AUTO: 0.67 K/UL — SIGNIFICANT CHANGE UP (ref 0–0.9)
MONOCYTES NFR BLD AUTO: 8.8 % — SIGNIFICANT CHANGE UP (ref 2–14)
NEUTROPHILS # BLD AUTO: 6.3 K/UL — SIGNIFICANT CHANGE UP (ref 1.8–7.4)
NEUTROPHILS NFR BLD AUTO: 83 % — HIGH (ref 43–77)
NITRITE UR-MCNC: NEGATIVE — SIGNIFICANT CHANGE UP
NRBC # BLD: 0 /100 WBCS — SIGNIFICANT CHANGE UP (ref 0–0)
PH UR: 6 — SIGNIFICANT CHANGE UP (ref 5–8)
PLATELET # BLD AUTO: 210 K/UL — SIGNIFICANT CHANGE UP (ref 150–400)
POTASSIUM SERPL-MCNC: 4.5 MMOL/L — SIGNIFICANT CHANGE UP (ref 3.5–5.3)
POTASSIUM SERPL-SCNC: 4.5 MMOL/L — SIGNIFICANT CHANGE UP (ref 3.5–5.3)
PROT SERPL-MCNC: 6 G/DL — SIGNIFICANT CHANGE UP (ref 6–8.3)
PROT UR-MCNC: NEGATIVE MG/DL — SIGNIFICANT CHANGE UP
PROTHROM AB SERPL-ACNC: 11 SEC — SIGNIFICANT CHANGE UP (ref 9.9–13.4)
RBC # BLD: 3.72 M/UL — LOW (ref 4.2–5.8)
RBC # FLD: 12.9 % — SIGNIFICANT CHANGE UP (ref 10.3–14.5)
SODIUM SERPL-SCNC: 139 MMOL/L — SIGNIFICANT CHANGE UP (ref 135–145)
SP GR SPEC: 1.02 — SIGNIFICANT CHANGE UP (ref 1–1.03)
UROBILINOGEN FLD QL: 1 MG/DL — SIGNIFICANT CHANGE UP (ref 0.2–1)
WBC # BLD: 7.59 K/UL — SIGNIFICANT CHANGE UP (ref 3.8–10.5)
WBC # FLD AUTO: 7.59 K/UL — SIGNIFICANT CHANGE UP (ref 3.8–10.5)

## 2024-12-23 PROCEDURE — 86850 RBC ANTIBODY SCREEN: CPT

## 2024-12-23 PROCEDURE — 81001 URINALYSIS AUTO W/SCOPE: CPT

## 2024-12-23 PROCEDURE — 72192 CT PELVIS W/O DYE: CPT | Mod: MC

## 2024-12-23 PROCEDURE — 36415 COLL VENOUS BLD VENIPUNCTURE: CPT

## 2024-12-23 PROCEDURE — 72131 CT LUMBAR SPINE W/O DYE: CPT | Mod: MC

## 2024-12-23 PROCEDURE — 85610 PROTHROMBIN TIME: CPT

## 2024-12-23 PROCEDURE — 72192 CT PELVIS W/O DYE: CPT | Mod: 26,MC

## 2024-12-23 PROCEDURE — 86900 BLOOD TYPING SEROLOGIC ABO: CPT

## 2024-12-23 PROCEDURE — 96374 THER/PROPH/DIAG INJ IV PUSH: CPT

## 2024-12-23 PROCEDURE — 80053 COMPREHEN METABOLIC PANEL: CPT

## 2024-12-23 PROCEDURE — 99285 EMERGENCY DEPT VISIT HI MDM: CPT | Mod: FS

## 2024-12-23 PROCEDURE — 99285 EMERGENCY DEPT VISIT HI MDM: CPT | Mod: 25

## 2024-12-23 PROCEDURE — 72131 CT LUMBAR SPINE W/O DYE: CPT | Mod: 26,MC

## 2024-12-23 PROCEDURE — 85730 THROMBOPLASTIN TIME PARTIAL: CPT

## 2024-12-23 PROCEDURE — 85025 COMPLETE CBC W/AUTO DIFF WBC: CPT

## 2024-12-23 PROCEDURE — 86901 BLOOD TYPING SEROLOGIC RH(D): CPT

## 2024-12-23 RX ORDER — ACETAMINOPHEN 500MG 500 MG/1
1000 TABLET, COATED ORAL ONCE
Refills: 0 | Status: COMPLETED | OUTPATIENT
Start: 2024-12-23 | End: 2024-12-23

## 2024-12-23 RX ADMIN — ACETAMINOPHEN 500MG 1000 MILLIGRAM(S): 500 TABLET, COATED ORAL at 16:54

## 2024-12-23 RX ADMIN — ACETAMINOPHEN 500MG 1000 MILLIGRAM(S): 500 TABLET, COATED ORAL at 17:24

## 2024-12-23 RX ADMIN — ACETAMINOPHEN 500MG 400 MILLIGRAM(S): 500 TABLET, COATED ORAL at 16:54

## 2024-12-23 NOTE — ED PROVIDER NOTE - PROGRESS NOTE DETAILS
Labs/XR ordered. On the phone with transfer center. Discussed with Dr. Lackey from [place] via transfer center. he advised no indication for emergent transfer or emergent MRI. He advised to perform CT scan and will reassess. Dr. Lackey now accepting transfer

## 2024-12-23 NOTE — ED PROVIDER NOTE - CLINICAL SUMMARY MEDICAL DECISION MAKING FREE TEXT BOX
Patient is a 79y old  Male who presents with a chief complaint of back pain. patient reports falling 1 week ago only his buttock/back. he was having pain but otherwise doing okay, today he was on the toilet to have a BM and then realized that he had one without feeling anything.     PE: Patient is  not in acute distress, no signs of head trauma, lungs clear bilaterally, abdomen is soft and nontender to palpation without guarding or rebound, normal pulses in all extremities, has low back tenderness. no saddle anesthesia but PA reports some decreased rectal tone. able to move arms and legs well    concerning for possible cord compression injury due to his bowel incontinence, PA spoke with spine attending at [facility] for emergent MRI and possible intervention however they wanted to have a CT scan done here first. We explained that we do not have MRI and cannot safetly rule out cord compression/cauda equina here without one, however he states no emergent need to transfer the patient yet. will obtain CT scan and re-consult. anticipate transfer vs admission

## 2024-12-23 NOTE — ED ADULT NURSE NOTE - OBJECTIVE STATEMENT
patient A&Ox3 in no acute distress c/o of lower back pain , patient had a fall 1 week ago denied hitting head no LOC . c/o of L lower leg pain , patient stated " I had  BM and I didn't even know it " lower extremities swollen no chest pain no difficulty breathing at this time

## 2024-12-23 NOTE — ED ADULT NURSE NOTE - NSFALLHARMRISKINTERV_ED_ALL_ED
Assistance OOB with selected safe patient handling equipment if applicable/Communicate risk of Fall with Harm to all staff, patient, and family/Monitor gait and stability/Provide patient with walking aids/Provide visual cue: red socks, yellow wristband, yellow gown, etc/Reinforce activity limits and safety measures with patient and family/Bed in lowest position, wheels locked, appropriate side rails in place/Call bell, personal items and telephone in reach/Instruct patient to call for assistance before getting out of bed/chair/stretcher/Non-slip footwear applied when patient is off stretcher/[place] to call system/Physically safe environment - no spills, clutter or unnecessary equipment/Purposeful Proactive Rounding/Room/bathroom lighting operational, light cord in reach

## 2024-12-23 NOTE — ED PROVIDER NOTE - NSICDXPASTMEDICALHX_GEN_ALL_CORE_FT
PAST MEDICAL HISTORY:  Bipolar disorder     H/O gastric ulcer back in 1978    MI (myocardial infarction)

## 2024-12-23 NOTE — ED PROVIDER NOTE - PHYSICAL EXAMINATION
Constitutional: Awake, Alert, non-toxic  HEAD: Normocephalic, atraumatic.   EYES: EOM intact, conjunctiva and sclera are clear bilaterally. No raccoon eyes.   ENT: No rhinorrhea, normal pharynx, patent, no tonsillar exudate or enlargement, mucous membranes pink/moist, no erythema, no drooling or stridor.   NECK: Supple, non-tender  BACK: (+) mild sacral TTP, No midline or paraspinal TTP of cervical/thoracic/lumbar spine, FROM. No ecchymosis or hematomas.   CARDIOVASCULAR: Normal S1, S2; regular rate and rhythm.  RESPIRATORY: Normal respiratory effort; breath sounds CTAB, no wheezes, rhonchi, or rales. Speaking in full sentences. No accessory muscle use.   ABDOMEN: Soft; non-tender, non-distended  RECTAL: (+) diminished tone   EXTREMITIES: Full passive and active ROM in all extremities; non-tender to palpation; distal pulses palpable and symmetric, no edema, no crepitus or step off  SKIN: Warm, dry; good skin turgor, no apparent lesions or rashes, no ecchymosis, brisk capillary refill.  NEURO: A&O x3. Sensory and motor functions are grossly intact. Speech is normal. Appearance and judgement seem appropriate for gender and age. No neurological deficits. Neurovascular sensation intact motor function 5/5 of upper and lower extremities

## 2024-12-23 NOTE — ED ADULT TRIAGE NOTE - CHIEF COMPLAINT QUOTE
" I fell a week ago from today, I went to see an Ortho PA at [facility] today. They sent me here because I told them that  I have a back pain and a bowel movement that I didn't even know " Pt reported pain goes down to left leg Pt  took Tylenol at 12 noon today

## 2024-12-23 NOTE — ED PROVIDER NOTE - OBJECTIVE STATEMENT
78 y/o male with PMHx HLD and Pancreatitis presents today due to a fall 1 week ago. pt reports he was doing well but experienced increased pain Thursday. pt has been taking Tylenol for pain management. pt reports he urinated slightly on himself 3 days ago but that has resolved. pt reports today he was sitting on the toilet to defecate in which he did not realize that he had a bowel movement. Pt went to urgent care prior to arrival today in which was advised to come to ED. Pt describes pain to lower back as aching, localized, and currently 8/10. pt denies numbness/weakness, head injury, blood thinner use, fever, abd pain, vomiting, headache, LE pain, or any other complaints.

## 2024-12-24 ENCOUNTER — INPATIENT (INPATIENT)
Facility: HOSPITAL | Age: 79
LOS: 3 days | Discharge: HOME HEALTH | DRG: 552 | End: 2024-12-28
Attending: STUDENT IN AN ORGANIZED HEALTH CARE EDUCATION/TRAINING PROGRAM | Admitting: STUDENT IN AN ORGANIZED HEALTH CARE EDUCATION/TRAINING PROGRAM
Payer: MEDICARE

## 2024-12-24 VITALS
TEMPERATURE: 98 F | RESPIRATION RATE: 20 BRPM | HEIGHT: 72 IN | OXYGEN SATURATION: 96 % | WEIGHT: 154.98 LBS | HEART RATE: 84 BPM | SYSTOLIC BLOOD PRESSURE: 160 MMHG | DIASTOLIC BLOOD PRESSURE: 72 MMHG

## 2024-12-24 DIAGNOSIS — S32.10XA UNSPECIFIED FRACTURE OF SACRUM, INITIAL ENCOUNTER FOR CLOSED FRACTURE: ICD-10-CM

## 2024-12-24 PROCEDURE — 99232 SBSQ HOSP IP/OBS MODERATE 35: CPT | Mod: FS

## 2024-12-24 PROCEDURE — 99284 EMERGENCY DEPT VISIT MOD MDM: CPT

## 2024-12-24 PROCEDURE — 27197 CLSD TX PELVIC RING FX: CPT

## 2024-12-24 PROCEDURE — 71045 X-RAY EXAM CHEST 1 VIEW: CPT | Mod: 26

## 2024-12-24 PROCEDURE — 99285 EMERGENCY DEPT VISIT HI MDM: CPT

## 2024-12-24 PROCEDURE — 72158 MRI LUMBAR SPINE W/O & W/DYE: CPT | Mod: 26

## 2024-12-24 PROCEDURE — 73590 X-RAY EXAM OF LOWER LEG: CPT | Mod: 26,LT

## 2024-12-24 PROCEDURE — 93010 ELECTROCARDIOGRAM REPORT: CPT

## 2024-12-24 PROCEDURE — 99223 1ST HOSP IP/OBS HIGH 75: CPT | Mod: 57,25

## 2024-12-24 PROCEDURE — 73600 X-RAY EXAM OF ANKLE: CPT | Mod: 26,LT

## 2024-12-24 RX ORDER — ATORVASTATIN CALCIUM 40 MG/1
20 TABLET, FILM COATED ORAL AT BEDTIME
Refills: 0 | Status: DISCONTINUED | OUTPATIENT
Start: 2024-12-24 | End: 2024-12-28

## 2024-12-24 RX ORDER — ASPIRIN 81 MG
81 TABLET, DELAYED RELEASE (ENTERIC COATED) ORAL DAILY
Refills: 0 | Status: DISCONTINUED | OUTPATIENT
Start: 2024-12-24 | End: 2024-12-28

## 2024-12-24 RX ORDER — LIDOCAINE 50 MG/G
1 OINTMENT TOPICAL EVERY 24 HOURS
Refills: 0 | Status: DISCONTINUED | OUTPATIENT
Start: 2024-12-24 | End: 2024-12-28

## 2024-12-24 RX ORDER — CHOLECALCIFEROL (VITAMIN D3) 10 MCG
1 TABLET ORAL
Refills: 0 | DISCHARGE

## 2024-12-24 RX ORDER — ASPIRIN 81 MG
81 TABLET, DELAYED RELEASE (ENTERIC COATED) ORAL DAILY
Refills: 0 | Status: DISCONTINUED | OUTPATIENT
Start: 2024-12-24 | End: 2024-12-24

## 2024-12-24 RX ORDER — CYANOCOBALAMIN 1000 UG/ML
1 INJECTION, SOLUTION INTRAMUSCULAR; SUBCUTANEOUS
Refills: 0 | DISCHARGE

## 2024-12-24 RX ORDER — ASCORBIC ACID 1000 MG
1 TABLET ORAL
Refills: 0 | DISCHARGE

## 2024-12-24 RX ORDER — LIPASE/PROTEASE/AMYLASE 8K-30K-30K
2 TABLET ORAL
Refills: 0 | DISCHARGE

## 2024-12-24 RX ORDER — IBUPROFEN 200 MG
600 TABLET ORAL EVERY 6 HOURS
Refills: 0 | Status: DISCONTINUED | OUTPATIENT
Start: 2024-12-24 | End: 2024-12-28

## 2024-12-24 RX ORDER — ASPIRIN 81 MG
2 TABLET, DELAYED RELEASE (ENTERIC COATED) ORAL
Refills: 0 | DISCHARGE

## 2024-12-24 RX ORDER — ACETAMINOPHEN 80 MG/.8ML
975 SOLUTION/ DROPS ORAL EVERY 8 HOURS
Refills: 0 | Status: DISCONTINUED | OUTPATIENT
Start: 2024-12-24 | End: 2024-12-28

## 2024-12-24 RX ORDER — ONDANSETRON 4 MG/1
4 TABLET ORAL EVERY 6 HOURS
Refills: 0 | Status: DISCONTINUED | OUTPATIENT
Start: 2024-12-24 | End: 2024-12-28

## 2024-12-24 RX ADMIN — ACETAMINOPHEN 975 MILLIGRAM(S): 80 SOLUTION/ DROPS ORAL at 07:30

## 2024-12-24 RX ADMIN — ACETAMINOPHEN 975 MILLIGRAM(S): 80 SOLUTION/ DROPS ORAL at 13:50

## 2024-12-24 RX ADMIN — Medication 81 MILLIGRAM(S): at 13:51

## 2024-12-24 RX ADMIN — ACETAMINOPHEN 975 MILLIGRAM(S): 80 SOLUTION/ DROPS ORAL at 06:18

## 2024-12-24 RX ADMIN — ACETAMINOPHEN 975 MILLIGRAM(S): 80 SOLUTION/ DROPS ORAL at 22:45

## 2024-12-24 RX ADMIN — LIDOCAINE 1 PATCH: 50 OINTMENT TOPICAL at 13:51

## 2024-12-24 RX ADMIN — ACETAMINOPHEN 975 MILLIGRAM(S): 80 SOLUTION/ DROPS ORAL at 14:20

## 2024-12-24 RX ADMIN — ACETAMINOPHEN 975 MILLIGRAM(S): 80 SOLUTION/ DROPS ORAL at 21:51

## 2024-12-24 NOTE — H&P ADULT - HISTORY OF PRESENT ILLNESS
HPI: 79y Male with history of HLD, CAD s/p stents (no AC/AP), pancreatitis and PSH of partial gastrectomy 2/2 perf gastric ulcer presents to the ED as a transfer from [place] after sustaining and mechanical ground level fall. Patient states that he slipped and fell last week, landing on his buttocks. Patient states the pain resolved and he was able to ambulate on his own without issue. However, 2 days prior to presentation he states that he went to use the bathroom and had bowel movements without knowing or having the urge to. He is able to control urination. He presentedt [facility] due to increasing pain in his back. In the ED, investigations significant for normal labs, CT pelvis and lumbar spine significant for left and right sacral fxs with the right extended into S1-S2 neural foramen, and L5 TP fx. Patient c/o back pain, no other issues or concerns at this time.       PAST MEDICAL & SURGICAL HISTORY:  Bipolar disorder      H/O gastric ulcer  back in       MI (myocardial infarction)      History of gastrectomy  in , for gastric ulcer        Home Meds: Home Medications:  aspirin 81 mg oral tablet: 1 tab(s) orally 2 times a day (24 May 2017 02:16)  atorvastatin 20 mg oral tablet: 1 tab(s) orally once a day (24 May 2017 02:16)  Depakote  mg oral tablet, extended release: 1 tab(s) orally 2 times a day (26 May 2017 14:53)  lactobacillus acidophilus oral capsule:  orally  (26 May 2017 14:15)  Lexapro 10 mg oral tablet: 1 tab(s) orally once a day (24 May 2017 02:16)    Allergies: Allergies    No Known Allergies    Intolerances      Soc:   Advanced Directives: Presumed Full Code     CURRENT MEDICATIONS:   --------------------------------------------------------------------------------------  Neurologic Medications  acetaminophen     Tablet .. 975 milliGRAM(s) Oral every 8 hours  ibuprofen  Tablet. 600 milliGRAM(s) Oral every 6 hours PRN Mild Pain (1 - 3)  ondansetron Injectable 4 milliGRAM(s) IV Push every 6 hours PRN Nausea    Respiratory Medications    Cardiovascular Medications    Gastrointestinal Medications    Genitourinary Medications    Hematologic/Oncologic Medications    Antimicrobial/Immunologic Medications    Endocrine/Metabolic Medications    Topical/Other Medications  lidocaine   4% Patch 1 Patch Transdermal every 24 hours    --------------------------------------------------------------------------------------    VITAL SIGNS, INS/OUTS (last 24 hours):  --------------------------------------------------------------------------------------  ICU Vital Signs Last 24 Hrs  T(C): 36.5 (24 Dec 2024 00:28), Max: 36.8 (23 Dec 2024 16:12)  T(F): 97.7 (24 Dec 2024 00:), Max: 98.2 (23 Dec 2024 16:12)  HR: 84 (24 Dec 2024 00:28) (70 - 90)  BP: 160/72 (24 Dec 2024 00:28) (147/77 - 182/83)  BP(mean): --  ABP: --  ABP(mean): --  RR: 20 (24 Dec 2024 00:28) (16 - 20)  SpO2: 96% (24 Dec 2024 00:28) (96% - 98%)    O2 Parameters below as of 24 Dec 2024 00:28  Patient On (Oxygen Delivery Method): room air          I&O's Summary    --------------------------------------------------------------------------------------    EXAM:  Constitutional: patient appears comfortable resting in bed, in no apparent distress  HEENT: head normocephalic and atraumatic  Respiratory: respirations are unlabored, no accessory muscle use, no conversational dyspnea  Cardiovascular: normal rate & normal rhythm on monitor  Gastrointestinal: abdomen is soft & non-distended, non-tender, no rebound tenderness / guarding, no saddle anesthesia, decreased rectal tone  Neurological: GCS15, A&O x 3  Musculoskeletal: CONNELL x 4 spontaneously, extremities are without point tenderness or deformity, no motor sensory deficits  Skin: mucous membranes moist, no diaphoresis, pallor, cyanosis or jaundice    LABS  --------------------------------------------------------------------------------------  Labs:  CAPILLARY BLOOD GLUCOSE                              11.9   7.59  )-----------( 210      ( 23 Dec 2024 16:53 )             35.5       Auto Neutrophil %: 83.0 % (24 @ 16:53)  Auto Immature Granulocyte %: 0.3 % (24 @ 16:53)        139  |  104  |  20  ----------------------------<  93  4.5   |  30  |  0.94      Calcium: 9.2 mg/dL (24 @ 16:53)      LFTs:             6.0  | 0.6  | 35       ------------------[118     ( 23 Dec 2024 16:53 )  2.6  | x    | 46          Lipase:x      Amylase:x             Coags:     11.0   ----< 0.93    ( 23 Dec 2024 16:53 )     29.5                Urinalysis Basic - ( 23 Dec 2024 18:01 )    Color: Yellow / Appearance: Clear / S.020 / pH: x  Gluc: x / Ketone: Negative mg/dL  / Bili: Negative / Urobili: 1.0 mg/dL   Blood: x / Protein: Negative mg/dL / Nitrite: Negative   Leuk Esterase: Trace / RBC: 30 /HPF / WBC 0 /HPF   Sq Epi: x / Non Sq Epi: x / Bacteria: x

## 2024-12-24 NOTE — H&P ADULT - NS ATTEND AMEND GEN_ALL_CORE FT
I have seen and examined this patient in the ED.  79y Male with history of HLD, CAD s/p stents (no AC/AP), pancreatitis and PSH of partial gastrectomy 2/2 perf gastric ulcer presents to the ED as a transfer from [place] after sustaining and mechanical ground level fall.  He endorses some pain in his back and left leg.  He endorses some urinary and fecal incontinence, but after Presley placement in the ED it was found to be in urinary retention. He denies any neurological complaints.  Imaging shows a left L5 TP fracture, and sacral fractures.  He will require admission, NSx consultation, MRI L-spine, CXR, and LLE XR to complete the trauma workup. Alert and oriented to person, place and time

## 2024-12-24 NOTE — ED PROVIDER NOTE - CLINICAL SUMMARY MEDICAL DECISION MAKING FREE TEXT BOX
79y M presents as transfer from [place] for L5 TP and sacral fractures after mechanical fall 1 week ago. Pt neuro intact on exam; however found to be in urinary retention with > 600 cc PVR. Presley ordered. Trauma and neurosurgery consulted. Admitted to trauma service.

## 2024-12-24 NOTE — CONSULT NOTE ADULT - ASSESSMENT
79[facility] [place] [facility], pancreatitis, bipolar disorder, presents today due to a fall 1 week ago onto butt. Pt reports low back pain that started worsening 4 days ago. States he urinated on himself twice 3 days ago but that has since resolved. Reports yesterday he was sitting on the toilet to defecate in which he did not feel his bowel movement. Currently, pt c/o b/l buttock pain with pain radiating down his leg leg to his L calf. Denies saddle anesthesia, weakness, numbness/tingling. [place] CT L-spine showing L5 transverse process nondisplaced fracture and comminuted sacral fractures.    Plan:  - Q4 neuro checks  - All imaging reviewed by Dr. Solomon   - Recommend MRI L-spine/sacrum w/wo contrast  - LSO brace when OOB, informed orthotist to deliver in AM  - PVR recorded in ED: 600cc, grigsby placed  - Pain control prn, avoid oversedation   - Ortho consult for sacral fxs  - Trauma eval  - Hold AC/AP  - DVT ppx: SCDs only  - Further medical management per primary team  - Will discuss with Dr. Solomon

## 2024-12-24 NOTE — CHART NOTE - NSCHARTNOTEFT_GEN_A_CORE
Patient seen and examined by Dr. Nichelle Lucas and Neurosurgery PAs. Patient found lying in bed without acute distress. No acute concerns at this time.     Plan-per Dr. Lucas  -MRI images reviewed by Dr. Lucas, no concern for Cauda Equina at this time  -Not recommending acute neurosurgical intervention at this time  -LSO brace ordered, use when out of bed  -Okay for SQL for DVT prophylaxis  -Further medical management per primary team  -Neurosurgery to sign off, please reconsult as needed. Patient seen and examined by Dr. Nichelle Lucas and Neurosurgery PAs. Patient found lying in bed without acute distress. Denies pain. No acute concerns at this time. Bilateral hip flexion and knee extension 4/5 (limited secondary to pain) and bilateral dorsi/plantar flexion 5/5.     Plan-per Dr. Lucas  -MRI images reviewed by Dr. Lucas, no concern for Cauda Equina at this time  -Not recommending acute neurosurgical intervention at this time  -LSO brace ordered, use when out of bed  -Okay for SQL for DVT prophylaxis  -Further medical management per primary team  -Neurosurgery to sign off, please reconsult as needed. Patient seen and examined by Dr. Nichelle Lucas and Neurosurgery PAs. Patient found lying in bed without acute distress. Denies pain. No acute concerns at this time. Bilateral hip flexion and knee extension 4/5 (limited secondary to pain) and bilateral dorsi/plantar flexion 5/5.     Plan-per Dr. Lucas  -MRI images reviewed by Dr. Lucas, no concern for Cauda Equina at this time  -Not recommending acute neurosurgical intervention at this time  -LSO brace ordered, use when out of bed  -Okay for SQL for DVT prophylaxis  -PVR recorded in ED: 600cc, grigsby placed  -Pain control per primary team, avoid over-sedation  -Ortho consult for sacral fxs  -Further medical management per primary team  -Neurosurgery to sign off, please reconsult as needed. Patient seen and examined by Dr. Nichelle Lucas and Neurosurgery PAs. Patient found lying in bed without acute distress. Denies pain. No acute concerns at this time. Bilateral hip flexion and knee extension 4/5 (limited secondary to pain) and bilateral dorsi/plantar flexion 5/5.     Plan-per Dr. Lucas  -MRI images reviewed by Dr. Lucas, no concern for Cauda Equina at this time  -Not recommending acute neurosurgical intervention at this time  -LSO brace ordered, use when out of bed  -Okay for SQL for DVT prophylaxis  -PVR recorded in ED: 600cc, grigsby placed  -Pain control per primary team, avoid over-sedation  -Ortho consult for sacral fxs  -Further medical management per primary team  -Neurosurgery to sign off, please reconsult as needed.  -Follow up with Dr. Solomon for outpatient visit in 1-2 weeks. Patient seen and examined by Dr. Nichelle Lucas and Neurosurgery PAs. Patient found lying in bed without acute distress. Denies pain. No acute concerns at this time. Bilateral hip flexion and knee extension 4/5 (limited secondary to pain) and bilateral dorsi/plantar flexion 5/5.     Plan-per Dr. Lucas  -MRI images reviewed by Dr. Lucas, no concern for Cauda Equina compression at this time  -Not recommending acute neurosurgical intervention at this time  -LSO brace ordered, use when out of bed  -Okay for SQL for DVT prophylaxis  -PVR recorded in ED: 600cc, grigsby placed  -Pain control per primary team, avoid over-sedation  -Ortho consult for sacral fxs  -Further medical management per primary team  -Neurosurgery to sign off, please reconsult as needed.  -Follow up with Dr. Solomon for outpatient visit in 1-2 weeks.    ATTENDING ATTESTATION:  Agree with above. Intact. No cauda equina compression on MRI. Outpatient follow up.

## 2024-12-24 NOTE — ED ADULT NURSE REASSESSMENT NOTE - NS ED NURSE REASSESS COMMENT FT1
Assumed care of pt from previous RN. Pt resting in bed A&Ox4 denies chest pain and SOB. Pt aware of plan to go to MRI and be admitted, pt RR even and unlabored, NAD noted.

## 2024-12-24 NOTE — CHART NOTE - NSCHARTNOTEFT_GEN_A_CORE
Harry was awake and alert during evaluation, measurements, and fitting of an [place]en LSO with lateral panels. Molded lateral panels to contour to patient's anatomy. Fit was good. Showed patient how to don and doff LSO. Provided written and verbal instructions. [facility] [phone]

## 2024-12-24 NOTE — CONSULT NOTE ADULT - SUBJECTIVE AND OBJECTIVE BOX
Pt Name: ASHLEY MARCELINO    MRN: [id number]      Patient is a 79y Male presenting to the emergency department with a chief complaint low back and pelvic pain after a fall last week. Patient reports he sustained a mechanical ground level fall landing on his buttocks. Patient states the pain resolved and he was able to ambulate on his own without issue. However, several days later he had worsening pain and presented to [facility].  CT pelvis and lumbar spine significant for left and right sacral fxs with the right extended into S1-S2 neural foramen, and L5 TP fx. Patient c/o back pain, no other issues or concerns at this time. Neurosurgery evaluation requested in addition to orthopedics consulted.         REVIEW OF SYSTEMS    General: Alert, responsive, in NAD    Skin/Breast: No rashes, no pruritis   	  Ophthalmologic: No visual changes. No redness.   	  ENMT:	No discharge. No swelling.    Respiratory and Thorax: No difficulty breathing. No cough.  	   Cardiovascular:	No chest pain. No palpitations.    Gastrointestinal:	 No abdominal pain. No diarrhea.     Genitourinary: No dysuria. No bleeding.    Musculoskeletal: SEE HPI.    Neurological: No sensory or motor changes.     Hematology/Lymphatics: No swelling.  ROS is otherwise negative.    PAST MEDICAL & SURGICAL HISTORY:  PAST MEDICAL & SURGICAL HISTORY:  Bipolar disorder      H/O gastric ulcer  back in 1978      MI (myocardial infarction)      History of gastrectomy  in 1978, for gastric ulcer          Allergies: No Known Allergies      Medications: acetaminophen     Tablet .. 975 milliGRAM(s) Oral every 8 hours  aspirin enteric coated 81 milliGRAM(s) Oral daily  atorvastatin 20 milliGRAM(s) Oral at bedtime  ibuprofen  Tablet. 600 milliGRAM(s) Oral every 6 hours PRN  lidocaine   4% Patch 1 Patch Transdermal every 24 hours  ondansetron Injectable 4 milliGRAM(s) IV Push every 6 hours PRN      FAMILY HISTORY:  Family history of heart attack (Father)    : non-contributory    Social History:     Ambulation: With Walker                         11.9   7.59  )-----------( 210      ( 23 Dec 2024 16:53 )             35.5       12-23    139  |  104  |  20  ----------------------------<  93  4.5   |  30  |  0.94    Ca    9.2      23 Dec 2024 16:53    TPro  6.0  /  Alb  2.6[L]  /  TBili  0.6  /  DBili  x   /  AST  35  /  ALT  46  /  AlkPhos  118  12-23      Vital Signs Last 24 Hrs  T(C): 36.7 (24 Dec 2024 12:14), Max: 36.8 (23 Dec 2024 16:12)  T(F): 98.1 (24 Dec 2024 12:14), Max: 98.2 (23 Dec 2024 16:12)  HR: 72 (24 Dec 2024 12:14) (70 - 90)  BP: 158/71 (24 Dec 2024 12:14) (138/62 - 182/83)  BP(mean): --  RR: 18 (24 Dec 2024 12:14) (16 - 20)  SpO2: 98% (24 Dec 2024 12:14) (95% - 98%)    Parameters below as of 24 Dec 2024 12:14  Patient On (Oxygen Delivery Method): room air        Daily Height in cm: 182.88 (24 Dec 2024 00:28)    Daily       PHYSICAL EXAM:      Appearance: Alert, responsive, in no acute distress.    Neurological: Sensation is grossly intact to light touch. 5/5 motor function of bilateral lower extremities. No focal deficits or weaknesses found.    Skin: no rash on visible skin. Skin is clean, dry and intact. No bleeding. No abrasions. No ulcerations.    Vascular: 2+ distal pulses. Cap refill < 2 sec. No signs of venous insufficiency or stasis. No extremity ulcerations. No cyanosis.    Musculoskeletal:       Bilateral lower extremities:   EHL/FHL intact. Compartments soft non tender  Motor and sensory remain intact.   Tenderness over sacral region. Pain with straight leg raises bilaterally.       Imaging Studies:  < from: Xray Ankle 2 Views, Left (12.24.24 @ 10:07) >  ACC: [id number] EXAM:  XR TIB FIB AP LAT 2 VIEWS LT   ORDERED BY: CINTHYA BOOKER     ACC: [id number] EXAM:  XR ANKLE 2 VIEWS LT   ORDERED BY: CINTHYA BOOKER     ACC: [id number] EXAM:  XR CHEST PORTABLE IMMED 1V   ORDERED BY: HAN OREILLY     PROCEDUREDATE:  12/24/2024          INTERPRETATION:  AP chest on December 24, 2024 at 9:56 AM. Patient had   trauma.    Heart size normal. Hyperexpanded lungs again noted. Fibrocalcific changes   in the left apex again noted.    There is no definite infiltrate or visible fracture.    Findings are similar to May 23, 2017.    Left tib-fib. 2 views. 4 images.    No knee effusion. Arterial calcification. Mild knee degeneration. No   fracture.    Left ankle. 2 views.    There is an inferior calcaneal spur. Mild degeneration of the malleolar   tips noted. No fracture.    IMPRESSION: No fracture. Stable COPD fibrocalcific changes left apex.    --- End of Report ---    MARGARETH SOTELO MD; Attending Radiologist  This document has been electronically signed. Dec 24 2024 10:35AM    < end of copied text >  < from: CT Pelvis Bony Only No Cont (12.23.24 @ 17:56) >  ACC: [id number] EXAM:  CT PELVIS BONY ONLY   ORDERED BY: NADIR YEBOAH     PROCEDURE DATE:  12/23/2024          INTERPRETATION:  Exam Type: CT PELVIS BONY  Exam Date and Time: 12/23/2024 5:56 PM  Indication: Fall with back pain  Comparison:No relevant prior studies available for comparison.    TECHNIQUE:  Multiplanar CT images of the pelvis were obtained without contrast. 3-D   reconstructions were performed    FINDINGS:    Fractures of the bilateral sacral justice extending from S1 to S3. Fracture   of the right L5 transverse process.    Moderate to severe degenerative changes of the bilateral hips with joint   space loss and osteophyte formation. Mild to moderate degenerative   changes of pubic symphysis. Moderate degenerative changes of lower lumbar   spine.    Vascular calcifications are present. Distended bladder.    IMPRESSION:  1.  Fractures of the bilateral sacral justice extending from S1 to S3.  2.  Fracture of the right L5 transverse process.    --- End of Report ---            MAHENDRA ARNOLD DO; Attending Radiologist  This document has been electronically signed. Dec 23 2024  6:13PM    < end of copied text >    A/P:  Pt is a  79y Male with bilateral sacral ala fx extending from S1-3     PLAN:   Images / reviewed and discussed with Dr Saenz, no surgical orthopedic intervention necessary for this fracture pattern.  * WBAT bilateral lower extremities with LSO brace   * Pain control  Neurosurgical team reviewed by Dr. Lucas, no concern for Cauda Equina at this time  -Not recommending acute neurosurgical intervention at this time  -LSO brace ordered, use when out of bed  PT eval / gait and stair training

## 2024-12-24 NOTE — CONSULT NOTE ADULT - SUBJECTIVE AND OBJECTIVE BOX
HPI:  79M PMH HLD, MI in 2012 on ASA81, pancreatitis presents today due to a fall 1 week ago onto butt. Pt reports low back pain that started worsening 4 days ago. States he urinated on himself twice 3 days ago but that has since resolved. Reports yesterday he was sitting on the toilet to defecate in which he did not feel his bowel movement. Pt has been taking Tylenol for pain . pt reports he urinated slightly on himself 3 days ago but that has resolved. pt reports today  Pt went to urgent care prior to arrival today in which was advised to come to ED. Pt describes pain to lower back as aching, localized, and currently 8/10. pt denies numbness/weakness, head injury, blood thinner use, fever, abd pain, vomiting, headache, LE pain, or any other complaints.    HISTORY OF PRESENT ILLNESS:   79yM PMH     PAST MEDICAL & SURGICAL HISTORY:  Bipolar disorder      H/O gastric ulcer  back in       MI (myocardial infarction)      History of gastrectomy  in , for gastric ulcer        FAMILY HISTORY:  Family history of heart attack (Father)        SOCIAL HISTORY:  Tobacco Use:  EtOH use:   Substance:    Allergies    No Known Allergies    Intolerances        REVIEW OF SYSTEMS  Negative except as noted in HPI  CONSTITUTIONAL: No fever, weight loss, or fatigue  EYES: No eye pain, visual disturbances, or discharge  ENMT:  No difficulty hearing, tinnitus, vertigo; No sinus or throat pain  NECK: No pain or stiffness  BREASTS: No pain, masses, or nipple discharge  RESPIRATORY: No cough, wheezing, chills or hemoptysis; No shortness of breath  CARDIOVASCULAR: No chest pain, palpitations, dizziness, or leg swelling  GASTROINTESTINAL: No abdominal or epigastric pain. No nausea, vomiting, or hematemesis; No diarrhea or constipation. No melena or hematochezia.  GENITOURINARY: No dysuria, frequency, hematuria, or incontinence  NEUROLOGICAL: No headaches, memory loss, loss of strength, numbness, or tremors  SKIN: No itching, burning, rashes, or lesions   LYMPH NODES: No enlarged glands  ENDOCRINE: No heat or cold intolerance; No hair loss  MUSCULOSKELETAL: No joint pain or swelling; No muscle, back, or extremity pain  PSYCHIATRIC: No depression, anxiety, mood swings, or difficulty sleeping  HEME/LYMPH: No easy bruising, or bleeding gums  ALLERY AND IMMUNOLOGIC: No hives or eczema    HOME MEDICATIONS:  Home Medications:  aspirin 81 mg oral tablet: 1 tab(s) orally 2 times a day (24 May 2017 02:16)  atorvastatin 20 mg oral tablet: 1 tab(s) orally once a day (24 May 2017 02:16)  Depakote  mg oral tablet, extended release: 1 tab(s) orally 2 times a day (26 May 2017 14:53)  lactobacillus acidophilus oral capsule:  orally  (26 May 2017 14:15)  Lexapro 10 mg oral tablet: 1 tab(s) orally once a day (24 May 2017 02:16)      MEDICATIONS:  Antibiotics:    Neuro:    Anticoagulation:    OTHER:    IVF:      Vital Signs Last 24 Hrs  T(C): 36.5 (24 Dec 2024 00:28), Max: 36.8 (23 Dec 2024 16:12)  T(F): 97.7 (24 Dec 2024 00:28), Max: 98.2 (23 Dec 2024 16:12)  HR: 84 (24 Dec 2024 00:28) (70 - 90)  BP: 160/72 (24 Dec 2024 00:28) (147/77 - 182/83)  BP(mean): --  RR: 20 (24 Dec 2024 00:28) (16 - 20)  SpO2: 96% (24 Dec 2024 00:28) (96% - 98%)    Parameters below as of 23 Dec 2024 16:12  Patient On (Oxygen Delivery Method): room air          PHYSICAL EXAM:  GENERAL: NAD, well-groomed  HEAD:  Atraumatic, normocephalic  DRAINS:   WOUND: Dressing clean dry intact; well healed  SHUNT: easily compressible and refills  EYES: Conjunctiva and sclera clear; corneal reflex intact  ENMT: No tonsillar erythema, exudates, or enlargement; moist mucous membranes, good dentition, no lesions  NECK: Supple, nontender to palpation  LOC COMA SCORE: E- V- M- =       E: 4= opens eyes spontaneously 3= to voice 2= to noxious 1= no opening       V: 5= oriented 4= confused 3= inappropriate words 2= incomprehensible sounds 1= nonverbal 1T= intubated       M: 6= follows commands 5= localizes 4= withdraws 3= flexor posturing 2= extensor posturing 1= no movement  MENTAL STATUS: AAO x3; Awake/Comatose; Opens eyes spontaneously/to voice/to light touch/to noxious stimuli; Appropriately conversant without aphasia/Nonverbal; following simple commands/mimicking/not following commands  CRANIAL NERVES: Visual acuity normal for age, visual fields full to confrontation, PERRL. EOMI without nystagmus. Facial sensation intact V1-3 distribution b/l. Face symmetric w/ normal eye closure and smile, tongue midline. Hearing grossly intact. Speech clear. Head turning and shoulder shrug intact.   REFLEXES: PERRL. Corneals intact b/l. Gag intact. Cough intact. Oculocephalic reflex intact (Doll's eye). Negative Lora's b/l. Negative clonus b/l  MOTOR: strength 5/5 b/l upper and lower extremities  Uppers     Delt (C5/6)     Bicep (C5/6)     Wrist Extend (C6)     Tricep (C7)     HG (C8/T1)  R                     5/5                 5/5                         5/5                           5/5                   5/5  L                      5/5                 5/5                         5/5                           5/5                   5/5  Lowers      HF(L1/L2)     KE (L3)     DF (L4)     EHL (L5)     PF (S1)      R                     5/5              5/5           5/5           5/5            5/5  L                     5/5               5/5          5/5            5/5            5/5  SENSATION: grossly intact to light touch all extremities  COORDINATION: Gait intact; rapid alternating movements intact b/l upper extremities; no upper extremity dysmetria  MUSCLE STRETCH REFLEXES: DTRs 2+ intact and symmetric  PLANTAR: upgoing/downgoing/mute (Babinski)  CHEST/LUNG: Clear to auscultation bilaterally; no rales, rhonchi, wheezing, or rubs  HEART: +S1/+S2; Regular rate and rhythm; no murmurs, rubs, or gallops  ABDOMEN: Soft, nontender, nondistended; bowel sounds present all four quadrants  EXTREMITIES:  2+ peripheral pulses, no clubbing, cyanosis, or edema  LYMPH: No lymphadenopathy noted  SKIN: Warm, dry; no rashes or lesions    LABS:                        11.9   7.59  )-----------( 210      ( 23 Dec 2024 16:53 )             35.5     12-    139  |  104  |  20  ----------------------------<  93  4.5   |  30  |  0.94    Ca    9.2      23 Dec 2024 16:53    TPro  6.0  /  Alb  2.6[L]  /  TBili  0.6  /  DBili  x   /  AST  35  /  ALT  46  /  AlkPhos  118  12-23    PT/INR - ( 23 Dec 2024 16:53 )   PT: 11.0 sec;   INR: 0.93 ratio         PTT - ( 23 Dec 2024 16:53 )  PTT:29.5 sec  Urinalysis Basic - ( 23 Dec 2024 18:01 )    Color: Yellow / Appearance: Clear / S.020 / pH: x  Gluc: x / Ketone: Negative mg/dL  / Bili: Negative / Urobili: 1.0 mg/dL   Blood: x / Protein: Negative mg/dL / Nitrite: Negative   Leuk Esterase: Trace / RBC: 30 /HPF / WBC 0 /HPF   Sq Epi: x / Non Sq Epi: x / Bacteria: x        CULTURES:      RADIOLOGY & ADDITIONAL STUDIES: HPI:  79M transfer from [place] with [facility] [place], [place] in  on ASA81, pancreatitis, bipolar disorder, presents today due to a fall 1 week ago onto butt. Pt reports low back pain that started worsening 4 days ago. States he urinated on himself twice 3 days ago but that has since resolved. Reports yesterday he was sitting on the toilet to defecate in which he did not feel his bowel movement. Pt has been taking Tylenol for pain with minimal improvement. Pt went to urgent care prior to arrival today in which was advised to come to ED. Currently, pt c/o b/l buttock pain with pain radiating down his leg leg to his L calf. Denies saddle anesthesia, weakness, numbness/tingling. Neurosurgery consulted for [place] CT L-spine showing L5 transverse process nondisplaced fracture and comminuted sacral fractures.    PAST MEDICAL & SURGICAL HISTORY:  Bipolar disorder  H/O gastric ulcer  back in   MI (myocardial infarction)  History of gastrectomy  in , for gastric ulcer    FAMILY HISTORY:  Family history of heart attack (Father)    SOCIAL HISTORY:  Tobacco Use: quit 40 years ago  EtOH use: occasionally 1 glass of wine  Substance: denies    Allergies  No Known Allergies    REVIEW OF SYSTEMS  Negative except as noted in HPI    HOME MEDICATIONS:  Home Medications:  aspirin 81 mg oral tablet: 1 tab(s) orally 2 times a day (24 May 2017 02:16)  atorvastatin 20 mg oral tablet: 1 tab(s) orally once a day (24 May 2017 02:16)  Depakote  mg oral tablet, extended release: 1 tab(s) orally 2 times a day (26 May 2017 14:53)  lactobacillus acidophilus oral capsule:  orally  (26 May 2017 14:15)  Lexapro 10 mg oral tablet: 1 tab(s) orally once a day (24 May 2017 02:16)    Vital Signs Last 24 Hrs  T(C): 36.5 (24 Dec 2024 00:28), Max: 36.8 (23 Dec 2024 16:12)  T(F): 97.7 (24 Dec 2024 00:28), Max: 98.2 (23 Dec 2024 16:12)  HR: 84 (24 Dec 2024 00:28) (70 - 90)  BP: 160/72 (24 Dec 2024 00:28) (147/77 - 182/83)  BP(mean): --  RR: 20 (24 Dec 2024 00:28) (16 - 20)  SpO2: 96% (24 Dec 2024 00:28) (96% - 98%)    Parameters below as of 23 Dec 2024 16:12  Patient On (Oxygen Delivery Method): room air    PHYSICAL EXAM:  GENERAL: NAD  HEAD: Atraumatic, normocephalic  LOC COMA SCORE: E-4 V-5 M-6 = 15  MENTAL STATUS: AAO x3; Awake; Opens eyes spontaneously; Appropriately conversant without aphasia; following commands  CRANIAL NERVES: PERRL. EOMI without nystagmus. Facial sensation intact V1-3 distribution b/l. Face symmetric w/ normal eye closure and smile, tongue midline. Hearing grossly intact. Speech clear.   REFLEXES: Negative clonus b/l  MOTOR: b/l UE 5/5; b/l HF 4/5, b/l KE 4/5 (pain limited), otherwise 5/5 strength; +rectal tone  SENSATION: grossly intact to light touch all extremities  CHEST/LUNG: Nonlabored breathing   SKIN: Warm, dry    LABS:                        11.9   7.59  )-----------( 210      ( 23 Dec 2024 16:53 )             35.5     12-23    139  |  104  |  20  ----------------------------<  93  4.5   |  30  |  0.94    Ca    9.2      23 Dec 2024 16:53    TPro  6.0  /  Alb  2.6[L]  /  TBili  0.6  /  DBili  x   /  AST  35  /  ALT  46  /  AlkPhos  118  12-23    PT/INR - ( 23 Dec 2024 16:53 )   PT: 11.0 sec;   INR: 0.93 ratio      PTT - ( 23 Dec 2024 16:53 )  PTT:29.5 sec  Urinalysis Basic - ( 23 Dec 2024 18:01 )    Color: Yellow / Appearance: Clear / S.020 / pH: x  Gluc: x / Ketone: Negative mg/dL  / Bili: Negative / Urobili: 1.0 mg/dL   Blood: x / Protein: Negative mg/dL / Nitrite: Negative   Leuk Esterase: Trace / RBC: 30 /HPF / WBC 0 /HPF   Sq Epi: x / Non Sq Epi: x / Bacteria: x    RADIOLOGY & ADDITIONAL STUDIES:  < from: CT Lumbar Spine No Cont (24 @ 17:52) >  IMPRESSION:    1. L5 right transverse process nondisplaced fracture    2. Right sacral fracture is comminuted and disrupts the anterior cortex   at the S2 level    3. Nondisplaced left sacral fracture    4. Multilevel lumbar degenerative disc disease includes grade 1 anterior   listhesis L4 on L5 contributing with additional degenerative features to   at least moderate degenerative central canal stenosis    5. Additional body imaging findings, see above

## 2024-12-24 NOTE — ED ADULT NURSE NOTE - NSFALLRISKINTERV_ED_ALL_ED
Assistance OOB with selected safe patient handling equipment if applicable/Assistance with ambulation/Communicate fall risk and risk factors to all staff, patient, and family/Provide patient with walking aids/Provide visual cue: yellow wristband, yellow gown, etc/Reinforce activity limits and safety measures with patient and family/Call bell, personal items and telephone in reach/Instruct patient to call for assistance before getting out of bed/chair/stretcher/Non-slip footwear applied when patient is off stretcher/[place] to call system/Physically safe environment - no spills, clutter or unnecessary equipment/Purposeful Proactive Rounding/Room/bathroom lighting operational, light cord in reach

## 2024-12-24 NOTE — ED PROVIDER NOTE - OBJECTIVE STATEMENT
79y M w/ hx HLD, CAD, pancreatic insufficiency, bipolar; presents as transfer from [place] for back injury. Pt had mechanical fall 1 week ago. Since then has had worsening low back pain radiating to legs. No numbness/weakness, no urinary symptoms, no incontinence. Was found to have L5 transverse process nondisplaced fracture and comminuted sacral fractures on CT scan. Was transferred for evaluation by spine/trauma teams.

## 2024-12-24 NOTE — ED PROVIDER NOTE - CARE PLAN
1 Principal Discharge DX:	Sacral fracture   Principal Discharge DX:	Sacral fracture  Secondary Diagnosis:	Lumbar transverse process fracture

## 2024-12-24 NOTE — ED ADULT TRIAGE NOTE - TEMPERATURE IN FAHRENHEIT (DEGREES F)
Per Dr. Proctor routing to nursing pools of Dr. Kevin Phillips and Dr. Vu Lara for scheduling   97.7

## 2024-12-24 NOTE — ED ADULT NURSE REASSESSMENT NOTE - NS ED NURSE REASSESS COMMENT FT1
assumed care of pt from critical care RN. pt a&ox4, in NAD resps even and unlabored. pt states he tripped on step at home and fell on his bottom. pt c/o mild pain to left leg. grigsby in place with urine output. pt denies any slurred speech, numbness/tingling, loss of sensation, shortness of breath, chest pain. safety maintained awaiting MRI.

## 2024-12-24 NOTE — ED ADULT TRIAGE NOTE - CHIEF COMPLAINT QUOTE
PT reports to ED as transfer [facility] with complaints of injuries from a fall. pt fell 1 week prior with complaints of hip and back pain. pt noted loss of bowel control which is new and loss of urine continence. PT noted in [place] to have fracture of bilateral sacral justice extending from S1 - S3, EMS reports non-eventful transfer reports 1g of acetaminophen given IV in [place]

## 2024-12-24 NOTE — CONSULT NOTE ADULT - NS ATTEND AMEND GEN_ALL_CORE FT
Orthopaedic Trauma Surgeon Addendum:    I have reviewed the physician assistant note and agree with the history, exam, and plan of care, except as noted.    Patient already able to ambulate despite pelvic ring fractures.  No intervention needed at this time.  WBAT, PT/OT. He may follow up with orthopedic surgery in [facility] or may follow up with an orthopedist closer to home.     Osteopenia and osteoporosis are significant risk factors for fragility fractures. Given the type of fracture that has occurred, I would highly recommend that the patient followup with their primary care physician to discuss treatment for low bone mineral density. We discussed the benefits of these medications frequently far outweigh the small risks. Their primary care physician can call the office with any specific questions or concerns.     Harry Saenz MD  Orthopaedic Trauma Surgeon  [facility] [place]

## 2024-12-24 NOTE — H&P ADULT - NSHPPOAPULMEMBOLUS_GEN_A_CORE
Medical Necessity Clause: This procedure was medically necessary because the lesions that were treated were: Include Z78.9 (Other Specified Conditions Influencing Health Status) As An Associated Diagnosis?: Yes no Detail Level: Zone Medical Necessity Information: It is in your best interest to select a reason for this procedure from the list below. All of these items fulfill various CMS LCD requirements except the new and changing color options. Pared With?: 15 blade Consent: The patient's consent was obtained including but not limited to risks of crusting, scabbing, blistering, scarring, darker or lighter pigmentary change, recurrence, incomplete removal and infection. Post-Care Instructions: I reviewed with the patient in detail post-care instructions. Patient is to wear sun protection and avoid picking at any of the treated lesions. Pt may apply petrolatum to crusted or scabbing areas. Number Of Freeze-Thaw Cycles: 3 freeze-thaw cycles Duration Of Freeze Thaw-Cycle (Seconds): 5

## 2024-12-24 NOTE — H&P ADULT - ASSESSMENT
79y Male with history of HLD, CAD s/p stents (no AC/AP), pancreatitis and PSH of partial gastrectomy 2/2 perf gastric ulcer presents to the ED as a transfer from [place] after sustaining and mechanical ground level fall. Patient states that he slipped and fell last week, landing on his buttocks. Patient states the pain resolved and he was able to ambulate on his own without issue. However, 2 days prior to presentation he states that he went to use the bathroom and had bowel movements without knowing or having the urge to. He is able to control urination. He presentedt [facility] due to increasing pain in his back. In the ED, investigations significant for normal labs, CT pelvis and lumbar spine significant for left and right sacral fxs with the right extended into S1-S2 neural foramen, and L5 TP fx. Patient c/o back pain, no other issues or concerns at this time.

## 2024-12-24 NOTE — ED ADULT NURSE NOTE - OBJECTIVE STATEMENT
[place], transfer from [place], patient reports having a fall a week ago, complaining of hip and back pain, patient reports urine incontinence and bowel control over the past day. . PT noted in [place] to have fracture of bilateral sacral justice extending from S1 - S3, patient reports receiving iv Tylenol at [place].

## 2024-12-25 LAB
ALBUMIN SERPL ELPH-MCNC: 2.7 G/DL — LOW (ref 3.3–5.2)
ALP SERPL-CCNC: 118 U/L — SIGNIFICANT CHANGE UP (ref 40–120)
ALT FLD-CCNC: 26 U/L — SIGNIFICANT CHANGE UP
ANION GAP SERPL CALC-SCNC: 10 MMOL/L — SIGNIFICANT CHANGE UP (ref 5–17)
AST SERPL-CCNC: 23 U/L — SIGNIFICANT CHANGE UP
BASOPHILS # BLD AUTO: 0.03 K/UL — SIGNIFICANT CHANGE UP (ref 0–0.2)
BASOPHILS NFR BLD AUTO: 0.5 % — SIGNIFICANT CHANGE UP (ref 0–2)
BILIRUB SERPL-MCNC: 0.5 MG/DL — SIGNIFICANT CHANGE UP (ref 0.4–2)
BUN SERPL-MCNC: 17 MG/DL — SIGNIFICANT CHANGE UP (ref 8–20)
CALCIUM SERPL-MCNC: 8.6 MG/DL — SIGNIFICANT CHANGE UP (ref 8.4–10.5)
CHLORIDE SERPL-SCNC: 103 MMOL/L — SIGNIFICANT CHANGE UP (ref 96–108)
CO2 SERPL-SCNC: 26 MMOL/L — SIGNIFICANT CHANGE UP (ref 22–29)
CREAT SERPL-MCNC: 0.7 MG/DL — SIGNIFICANT CHANGE UP (ref 0.5–1.3)
EGFR: 93 ML/MIN/1.73M2 — SIGNIFICANT CHANGE UP
EOSINOPHIL # BLD AUTO: 0.14 K/UL — SIGNIFICANT CHANGE UP (ref 0–0.5)
EOSINOPHIL NFR BLD AUTO: 2.2 % — SIGNIFICANT CHANGE UP (ref 0–6)
GLUCOSE SERPL-MCNC: 91 MG/DL — SIGNIFICANT CHANGE UP (ref 70–99)
HCT VFR BLD CALC: 35.9 % — LOW (ref 39–50)
HGB BLD-MCNC: 11.7 G/DL — LOW (ref 13–17)
IMM GRANULOCYTES NFR BLD AUTO: 0.5 % — SIGNIFICANT CHANGE UP (ref 0–0.9)
LYMPHOCYTES # BLD AUTO: 0.52 K/UL — LOW (ref 1–3.3)
LYMPHOCYTES # BLD AUTO: 8 % — LOW (ref 13–44)
MCHC RBC-ENTMCNC: 31.3 PG — SIGNIFICANT CHANGE UP (ref 27–34)
MCHC RBC-ENTMCNC: 32.6 G/DL — SIGNIFICANT CHANGE UP (ref 32–36)
MCV RBC AUTO: 96 FL — SIGNIFICANT CHANGE UP (ref 80–100)
MONOCYTES # BLD AUTO: 0.55 K/UL — SIGNIFICANT CHANGE UP (ref 0–0.9)
MONOCYTES NFR BLD AUTO: 8.5 % — SIGNIFICANT CHANGE UP (ref 2–14)
NEUTROPHILS # BLD AUTO: 5.19 K/UL — SIGNIFICANT CHANGE UP (ref 1.8–7.4)
NEUTROPHILS NFR BLD AUTO: 80.3 % — HIGH (ref 43–77)
PLATELET # BLD AUTO: 210 K/UL — SIGNIFICANT CHANGE UP (ref 150–400)
POTASSIUM SERPL-MCNC: 3.7 MMOL/L — SIGNIFICANT CHANGE UP (ref 3.5–5.3)
POTASSIUM SERPL-SCNC: 3.7 MMOL/L — SIGNIFICANT CHANGE UP (ref 3.5–5.3)
PROT SERPL-MCNC: 5.2 G/DL — LOW (ref 6.6–8.7)
RBC # BLD: 3.74 M/UL — LOW (ref 4.2–5.8)
RBC # FLD: 12.6 % — SIGNIFICANT CHANGE UP (ref 10.3–14.5)
SODIUM SERPL-SCNC: 139 MMOL/L — SIGNIFICANT CHANGE UP (ref 135–145)
WBC # BLD: 6.46 K/UL — SIGNIFICANT CHANGE UP (ref 3.8–10.5)
WBC # FLD AUTO: 6.46 K/UL — SIGNIFICANT CHANGE UP (ref 3.8–10.5)

## 2024-12-25 PROCEDURE — 99232 SBSQ HOSP IP/OBS MODERATE 35: CPT | Mod: FS

## 2024-12-25 RX ORDER — POTASSIUM CHLORIDE 600 MG/1
20 TABLET, FILM COATED, EXTENDED RELEASE ORAL ONCE
Refills: 0 | Status: COMPLETED | OUTPATIENT
Start: 2024-12-25 | End: 2024-12-25

## 2024-12-25 RX ADMIN — ACETAMINOPHEN 975 MILLIGRAM(S): 80 SOLUTION/ DROPS ORAL at 13:47

## 2024-12-25 RX ADMIN — LIDOCAINE 1 PATCH: 50 OINTMENT TOPICAL at 01:00

## 2024-12-25 RX ADMIN — ACETAMINOPHEN 975 MILLIGRAM(S): 80 SOLUTION/ DROPS ORAL at 06:59

## 2024-12-25 RX ADMIN — ACETAMINOPHEN 975 MILLIGRAM(S): 80 SOLUTION/ DROPS ORAL at 13:17

## 2024-12-25 RX ADMIN — ACETAMINOPHEN 975 MILLIGRAM(S): 80 SOLUTION/ DROPS ORAL at 23:45

## 2024-12-25 RX ADMIN — Medication 81 MILLIGRAM(S): at 13:17

## 2024-12-25 RX ADMIN — ATORVASTATIN CALCIUM 20 MILLIGRAM(S): 40 TABLET, FILM COATED ORAL at 22:56

## 2024-12-25 RX ADMIN — ACETAMINOPHEN 975 MILLIGRAM(S): 80 SOLUTION/ DROPS ORAL at 22:56

## 2024-12-25 RX ADMIN — POTASSIUM CHLORIDE 20 MILLIEQUIVALENT(S): 600 TABLET, FILM COATED, EXTENDED RELEASE ORAL at 13:17

## 2024-12-25 RX ADMIN — ATORVASTATIN CALCIUM 20 MILLIGRAM(S): 40 TABLET, FILM COATED ORAL at 00:48

## 2024-12-25 RX ADMIN — LIDOCAINE 1 PATCH: 50 OINTMENT TOPICAL at 13:17

## 2024-12-25 NOTE — PHYSICAL THERAPY INITIAL EVALUATION ADULT - ADDITIONAL COMMENTS
Pt reports independent with RW PTA, admits to recent falls in the house. Owns RW, SAC, shower chair. Pt lives with wife in private home, 3 MARIO without handrails, 4 steps with rails to bathroom. Pt reports wife can assist if needed.

## 2024-12-25 NOTE — PHYSICAL THERAPY INITIAL EVALUATION ADULT - PREDICTED DURATION OF THERAPY (DAYS/WKS), PT EVAL
To [facility]    Last Visit: 3.31.22    Last Refill:  amphetamine-dextroamphetamine (ADDERALL) 20 MG tablet 60 tablet 0 6/8/2022     Sig - Route: Take 1 tablet by mouth 2 times daily. - Oral    Sent to pharmacy as: Amphetamine-Dextroamphetamine 20 MG Oral Tablet (ADDERALL)      Pended for approval      
2 weeks

## 2024-12-25 NOTE — PHYSICAL THERAPY INITIAL EVALUATION ADULT - SHORT TERM MEMORY, REHAB EVAL
----- Message from Charlotte Hurley DO sent at 2/14/2024  4:43 PM CST -----  Normal mammogram.  Repeat in 1 year intact

## 2024-12-25 NOTE — PROGRESS NOTE ADULT - NS ATTEND AMEND GEN_ALL_CORE FT
I have seen and examined this patient with the surgery team on AM rounds.  No acute events overnight.  Patient appears well this morning.  Endorses pain in his leg.  Denies urinary or rectal incontinence.  PT recs appreciated.  Plan for DC to [place].

## 2024-12-25 NOTE — PHYSICAL THERAPY INITIAL EVALUATION ADULT - PERTINENT HX OF CURRENT PROBLEM, REHAB EVAL
Pt is 79y Male with history of HLD, CAD s/p stents (no AC/AP), pancreatitis and PSH of partial gastrectomy 2/2 perf gastric ulcer presents to the ED as a transfer from [place] after sustaining and mechanical ground level fall. Patient states that he slipped and fell last week, landing on his buttocks. Now with sacral fx and L5 TP fx

## 2024-12-25 NOTE — PROGRESS NOTE ADULT - SUBJECTIVE AND OBJECTIVE BOX
Subjective:  Patient seen and examined at bedside. No acute events overnight. No concerns reported by patient.       Vital Signs Last 24 Hrs  T(C): 36.7 (25 Dec 2024 05:07), Max: 36.9 (24 Dec 2024 15:52)  T(F): 98.1 (25 Dec 2024 05:07), Max: 98.5 (24 Dec 2024 15:52)  HR: 68 (25 Dec 2024 05:07) (63 - 78)  BP: 153/73 (25 Dec 2024 05:07) (126/65 - 158/71)  BP(mean): 85 (24 Dec 2024 19:10) (85 - 85)  RR: 18 (25 Dec 2024 05:07) (17 - 18)  SpO2: 96% (25 Dec 2024 05:07) (95% - 98%)    Parameters below as of 25 Dec 2024 05:07  Patient On (Oxygen Delivery Method): room air        EXAM:  Constitutional: patient appears comfortable resting in bed, in no apparent distress  HEENT: head normocephalic and atraumatic  Respiratory: respirations are unlabored, no accessory muscle use, no conversational dyspnea  Cardiovascular: normal rate & normal rhythm on monitor  Gastrointestinal: abdomen is soft & non-distended, non-tender, no rebound tenderness / guarding, no saddle anesthesia, decreased rectal tone  Neurological: GCS15, A&O x 3  Musculoskeletal: CONNELL x 4 spontaneously, extremities are without point tenderness or deformity, no motor sensory deficits  Skin: mucous membranes moist, no diaphoresis, pallor, cyanosis or jaundice      LABS:                        11.9   7.59  )-----------( 210      ( 23 Dec 2024 16:53 )             35.5     12-    139  |  104  |  20  ----------------------------<  93  4.5   |  30  |  0.94    Ca    9.2      23 Dec 2024 16:53    TPro  6.0  /  Alb  2.6[L]  /  TBili  0.6  /  DBili  x   /  AST  35  /  ALT  46  /  AlkPhos  118  12-23    PT/INR - ( 23 Dec 2024 16:53 )   PT: 11.0 sec;   INR: 0.93 ratio         PTT - ( 23 Dec 2024 16:53 )  PTT:29.5 sec  Urinalysis Basic - ( 23 Dec 2024 18:01 )    Color: Yellow / Appearance: Clear / S.020 / pH: x  Gluc: x / Ketone: Negative mg/dL  / Bili: Negative / Urobili: 1.0 mg/dL   Blood: x / Protein: Negative mg/dL / Nitrite: Negative   Leuk Esterase: Trace / RBC: 30 /HPF / WBC 0 /HPF   Sq Epi: x / Non Sq Epi: x / Bacteria: x

## 2024-12-26 PROCEDURE — 99232 SBSQ HOSP IP/OBS MODERATE 35: CPT

## 2024-12-26 PROCEDURE — 99223 1ST HOSP IP/OBS HIGH 75: CPT | Mod: GC

## 2024-12-26 RX ORDER — POTASSIUM CHLORIDE 600 MG/1
40 TABLET, FILM COATED, EXTENDED RELEASE ORAL ONCE
Refills: 0 | Status: COMPLETED | OUTPATIENT
Start: 2024-12-26 | End: 2024-12-26

## 2024-12-26 RX ADMIN — ATORVASTATIN CALCIUM 20 MILLIGRAM(S): 40 TABLET, FILM COATED ORAL at 21:23

## 2024-12-26 RX ADMIN — ACETAMINOPHEN 975 MILLIGRAM(S): 80 SOLUTION/ DROPS ORAL at 17:53

## 2024-12-26 RX ADMIN — POTASSIUM CHLORIDE 40 MILLIEQUIVALENT(S): 600 TABLET, FILM COATED, EXTENDED RELEASE ORAL at 08:59

## 2024-12-26 RX ADMIN — LIDOCAINE 1 PATCH: 50 OINTMENT TOPICAL at 19:00

## 2024-12-26 RX ADMIN — ACETAMINOPHEN 975 MILLIGRAM(S): 80 SOLUTION/ DROPS ORAL at 16:53

## 2024-12-26 RX ADMIN — Medication 81 MILLIGRAM(S): at 12:55

## 2024-12-26 RX ADMIN — ACETAMINOPHEN 975 MILLIGRAM(S): 80 SOLUTION/ DROPS ORAL at 06:50

## 2024-12-26 RX ADMIN — LIDOCAINE 1 PATCH: 50 OINTMENT TOPICAL at 12:55

## 2024-12-26 RX ADMIN — ACETAMINOPHEN 975 MILLIGRAM(S): 80 SOLUTION/ DROPS ORAL at 07:20

## 2024-12-26 NOTE — CHART NOTE - NSCHARTNOTEFT_GEN_A_CORE
Tertiary Trauma Survey (TTS)    Date of TTS: 12-26-24 @ 16:19                             Admit Date: 12-24-24 @ 04:15      Trauma Activation:    List Injuries Identified to Date:  1.  Fractures of the bilateral sacral justice extending from S1 to S3.  2.  Fracture of the right L5 transverse process.      List Operative and Interventional Radiological Procedures:           EXAM:  Constitutional: patient appears comfortable resting in bed, in no apparent distress  HEENT: head normocephalic and atraumatic  Respiratory: respirations are unlabored, no accessory muscle use, no conversational dyspnea  Cardiovascular: normal rate & normal rhythm on monitor  Gastrointestinal: abdomen is soft & non-distended, non-tender, no rebound tenderness / guarding, no saddle anesthesia, decreased rectal tone  Neurological: GCS15, A&O x 3  Musculoskeletal: CONNELL x 4 spontaneously, extremities are without point tenderness or deformity, no motor sensory deficits  Skin: mucous membranes moist, no diaphoresis, pallor, cyanosis or jaundice      RADIOLOGICAL FINDINGS REVIEW:  CT Pelvis  IMPRESSION:  1.  Fractures of the bilateral sacral justice extending from S1 to S3.  2.  Fracture of the right L5 transverse process.    MR L-spine  IMPRESSION:  Acute sacral insufficiency fracture.  Multilevel degenerative changes as detailed above.    INCIDENTAL FINDINGS:    [ ] No    [X] Yes, Findings are:  Impression:    Right upper lobe airspace consolidation with smaller areas of groundglass   and centrilobular/tree-in-bud nodular opacities, findings likely   represent pneumonia and infectious/inflammatory airway disease.  Recommend short interval follow-up chest CT examination in 10-12 weeks   following the appropriate clinical course of treatment.        [X] Tertiary exam complete, there are no new injuries identified    [ ] Tertiary exam done, new injuries identified are:                [ ] Imaging ordered:

## 2024-12-26 NOTE — PROGRESS NOTE ADULT - SUBJECTIVE AND OBJECTIVE BOX
Subjective: Patient was seen and examined at bedside. Pt denies any acute complaints, no overnight events       MEDICATIONS  (STANDING):  acetaminophen     Tablet .. 975 milliGRAM(s) Oral every 8 hours  aspirin enteric coated 81 milliGRAM(s) Oral daily  atorvastatin 20 milliGRAM(s) Oral at bedtime  lidocaine   4% Patch 1 Patch Transdermal every 24 hours    MEDICATIONS  (PRN):  ibuprofen  Tablet. 600 milliGRAM(s) Oral every 6 hours PRN Mild Pain (1 - 3)  ondansetron Injectable 4 milliGRAM(s) IV Push every 6 hours PRN Nausea      Vital Signs Last 24 Hrs  T(C): 36.6 (26 Dec 2024 12:22), Max: 36.8 (26 Dec 2024 04:50)  T(F): 97.9 (26 Dec 2024 12:22), Max: 98.2 (26 Dec 2024 04:50)  HR: 96 (26 Dec 2024 12:22) (68 - 99)  BP: 129/73 (26 Dec 2024 12:22) (120/65 - 160/74)  BP(mean): --  RR: 18 (26 Dec 2024 12:22) (17 - 18)  SpO2: 97% (26 Dec 2024 12:22) (93% - 98%)    Parameters below as of 26 Dec 2024 09:42  Patient On (Oxygen Delivery Method): room air        Physical Exam:    Constitutional: NAD  HEENT: PERRL, EOMI  Neck: No JVD, FROM without pain  Respiratory: Respirations non-labored, no accessory muscle use          LABS:                        11.7   6.46  )-----------( 210      ( 25 Dec 2024 06:30 )             35.9     12-25    139  |  103  |  17.0  ----------------------------<  91  3.7   |  26.0  |  0.70    Ca    8.6      25 Dec 2024 06:30    TPro  5.2[L]  /  Alb  2.7[L]  /  TBili  0.5  /  DBili  x   /  AST  23  /  ALT  26  /  AlkPhos  118  12-25      Urinalysis Basic - ( 25 Dec 2024 06:30 )    Color: x / Appearance: x / SG: x / pH: x  Gluc: 91 mg/dL / Ketone: x  / Bili: x / Urobili: x   Blood: x / Protein: x / Nitrite: x   Leuk Esterase: x / RBC: x / WBC x   Sq Epi: x / Non Sq Epi: x / Bacteria: x        A: 79y Male with history of HLD, CAD s/p stents (no AC/AP), pancreatitis and PSH of partial gastrectomy 2/2 perf gastric ulcer presents to the ED as a transfer from [place] after sustaining and mechanical ground level fall. Patient states that he slipped and fell last week, landing on his buttocks. Patient states the pain resolved and he was able to ambulate on his own without issue. However, 2 days prior to presentation he states that he went to use the bathroom and had bowel movements without knowing or having the urge to. He is able to control urination. He presentedt [facility] due to increasing pain in his back. In the ED, investigations significant for normal labs, CT pelvis and lumbar spine significant for left and right sacral fxs with the right extended into S1-S2 neural foramen, and L5 TP fx. Patient c/o back pain, no other issues or concerns at this time.     Plan:  -reg diet   -MRI L-spine done, LSO brace when out of bed as per neurosurgery   -Q4 neurochecks  - DC to acute rehab

## 2024-12-26 NOTE — CONSULT NOTE ADULT - ATTENDING COMMENTS
Patient seen and examined. Case discussed with Dr. Ramsey and agree with recommendations outlined above. I have personally reviewed the imaging and labs listed above.    Rehab/Impaired mobility and function -  Patient continues to require hospitalization for the above diagnoses and ongoing active management of comorbid complications that are substantially posing a threat to bodily function, functional ability and quality of life.    RECOMMEND - OOB daily     When medically optimized, based on the patient's diagnosis, current functional status and potential for progress, recommend ACUTE inpatient rehabilitation for the functional deficits consisting of 3 hours of therapy/day & 24 hour RN/daily PMR physician for comorbid medical management. Patient will be able to tolerate 3 hours a day.      Will continue to follow. Rehab recommendations are dependent on how functional progress changes as well as how patient continues to participate and tolerate therapeutic interventions, which may change. Recommend ongoing mobilization by staff to maintain cardiopulmonary function and prevention of secondary complications related to debility. Discussed the specific rehabilitation management and recommendations above with Ayah BLUM and [facility] Trauma Team.    Total Time Spent on Encounter (reviewing clinical notes, labs, radiology and medications, reviewing patient history, discussing with resident, pre-rounding, physical exam, assessment and discussing rehabilitation options - with consideration of prior level of function, expected level of recovery and return to community living, rounding with team) - 75 minutes

## 2024-12-26 NOTE — CONSULT NOTE ADULT - SUBJECTIVE AND OBJECTIVE BOX
HPI:  Patient is a 79y Male presenting to the emergency department with a chief complaint low back and pelvic pain after a fall last week. Patient reports he sustained a mechanical ground level fall landing on his buttocks. Patient states the pain resolved and he was able to ambulate on his own without issue. However, several days later he had worsening pain and presented to [facility].  CT pelvis and lumbar spine significant for left and right sacral fxs with the right extended into S1-S2 neural foramen, and L5 TP fx. Patient c/o back pain, no other issues or concerns at this time. Neurosurgery evaluation requested in addition to orthopedics consulted.           Imaging Reviewed Today:  CT L-spine 12/23  1. L5 right transverse process nondisplaced fracture    2. Right sacral fracture is comminuted and disrupts the anterior cortex   at the S2 level    3. Nondisplaced left sacral fracture    4. Multilevel lumbar degenerative disc disease includes grade 1 anterior   listhesis L4 on L5 contributing with additional degenerative features to   at least moderate degenerative central canal stenosis    5. Additional body imaging findings, see above    12/23 CT Pelvis  1.  Fractures of the bilateral sacral justice extending from S1 to S3.  2.  Fracture of the right L5 transverse process.    12/23 MR Spine  Acute sacral insufficiency fracture.  Multilevel degenerative changes as detailed in full report.    12/24 CXR, tib/fib/ankle XR  No fracture. Stable COPD fibrocalcific changes left apex.      ----------------------------------------------------------------------------------------  CHIEF COMPLAINT    sacral lumbar fx      ----------------------------------------------------------------------------------------  VITALS  T(C): 36.8 (12-26-24 @ 04:50), Max: 36.8 (12-25-24 @ 12:38)  HR: 75 (12-26-24 @ 04:50) (64 - 90)  BP: 131/64 (12-26-24 @ 04:50) (120/65 - 158/82)  RR: 18 (12-26-24 @ 04:50) (17 - 18)  SpO2: 96% (12-26-24 @ 04:50) (92% - 98%)  Wt(kg): --    PAST MEDICAL & SURGICAL HISTORY  Bipolar disorder    H/O gastric ulcer    MI (myocardial infarction)    History of gastrectomy          LABS  REVIEWED    CBC Full  -  ( 25 Dec 2024 06:30 )  WBC Count : 6.46 K/uL  RBC Count : 3.74 M/uL  Hemoglobin : 11.7 g/dL  Hematocrit : 35.9 %  Platelet Count - Automated : 210 K/uL  Mean Cell Volume : 96.0 fl  Mean Cell Hemoglobin : 31.3 pg  Mean Cell Hemoglobin Concentration : 32.6 g/dL  Auto Neutrophil # : 5.19 K/uL  Auto Lymphocyte # : 0.52 K/uL  Auto Monocyte # : 0.55 K/uL  Auto Eosinophil # : 0.14 K/uL  Auto Basophil # : 0.03 K/uL  Auto Neutrophil % : 80.3 %  Auto Lymphocyte % : 8.0 %  Auto Monocyte % : 8.5 %  Auto Eosinophil % : 2.2 %  Auto Basophil % : 0.5 %    12-25    139  |  103  |  17.0  ----------------------------<  91  3.7   |  26.0  |  0.70    Ca    8.6      25 Dec 2024 06:30    TPro  5.2[L]  /  Alb  2.7[L]  /  TBili  0.5  /  DBili  x   /  AST  23  /  ALT  26  /  AlkPhos  118  12-25    Urinalysis Basic - ( 25 Dec 2024 06:30 )    Color: x / Appearance: x / SG: x / pH: x  Gluc: 91 mg/dL / Ketone: x  / Bili: x / Urobili: x   Blood: x / Protein: x / Nitrite: x   Leuk Esterase: x / RBC: x / WBC x   Sq Epi: x / Non Sq Epi: x / Bacteria: x        ALLERGIES  No Known Allergies      MEDICATIONS   acetaminophen     Tablet .. 975 milliGRAM(s) Oral every 8 hours  aspirin enteric coated 81 milliGRAM(s) Oral daily  atorvastatin 20 milliGRAM(s) Oral at bedtime  ibuprofen  Tablet. 600 milliGRAM(s) Oral every 6 hours PRN  lidocaine   4% Patch 1 Patch Transdermal every 24 hours  ondansetron Injectable 4 milliGRAM(s) IV Push every 6 hours PRN        ----------------------------------------------------------------------------------------  FUNCTIONAL HISTORY  Pt reports independent with RW PTA, admits to recent falls in the house.   Owns RW, SAC, shower chair.   Pt lives with wife in private home, 3 MARIO without handrails, 4 steps with rails to bathroom. Pt reports wife can assist if needed.    CURRENT FUNCTIONAL STATUS  12/25 PT    Bed Mobility: Rolling/Turning:     · Level of [place]	moderate assist (50% patients effort)  · Physical Assist/Nonphysical Assist	1 person assist; verbal cues    Bed Mobility: Sit to Supine:     · Level of [place]	minimum assist (75% patients effort)  · Physical Assist/Nonphysical Assist	1 person assist; verbal cues    Bed Mobility: Supine to Sit:     · Level of [place]	moderate assist (50% patients effort)  · Physical Assist/Nonphysical Assist	1 person assist; verbal cues    Transfer: Sit to Stand:     · Level of [place]	moderate assist (50% patients effort)  · Physical Assist/Nonphysical Assist	verbal cues; 1 person assist  · Weight-Bearing Restrictions	weight-bearing as tolerated  · Assistive Device	rolling walker    Transfer: Stand to Sit:     · Level of [place]	moderate assist (50% patients effort)  · Physical Assist/Nonphysical Assist	verbal cues; 1 person assist  · Weight-Bearing Restrictions	weight-bearing as tolerated  · Assistive Device	rolling walker    Gait Skills:     · Level of [place]	moderate assist (50% patients effort)  · Physical Assist/Nonphysical Assist	1 person assist; verbal cues; VCs for proximity to walker  · Weight-Bearing Restrictions	weight-bearing as tolerated  · Assistive Device	rolling walker  · Gait Distance	25 feet  · Brace/Orthotics	LSO    ----------------------------------------------------------------------------------------  PHYSICAL EXAM  Constitutional - NAD, Comfortable  HEENT - NCAT, EOMI  Neck - Supple, No limited ROM  Chest - Breathing comfortably, No wheezing  Cardiovascular - S1S2   Abdomen - Soft   Extremities - No C/C/E, No calf tenderness   Neurologic Exam -                    Cognitive - AAO to self, place, date, year, situation     Communication - Fluent, No dysarthria     Cranial Nerves - CN 2-12 intact     FUNCTIONAL MOTOR EXAM - No focal deficits                    LEFT    UE - ShAB 5/5, EF 5/5, EE 5/5, WE 5/5,  5/5                    RIGHT UE - ShAB 5/5, EF 5/5, EE 5/5, WE 5/5,  5/5                    LEFT    LE - HF 5/5, KE 5/5, DF 5/5, PF 5/5                    RIGHT LE - HF 5/5, KE 5/5, DF 5/5, PF 5/5        Sensory - Intact to LT     Reflexes - DTR Intact, No primitive reflexive     Coordination - FTN intact     OculoVestibular - No saccades, No nystagmus, VOR         Balance - WNL Static  Psychiatric - Mood stable, Affect WNL  ----------------------------------------------------------------------------------------  ASSESSMENT/PLAN  79yMale with functional deficits after fall with bl sacaral fracture extending into s1-s2 neural foramen. Orthopedics consulted and no acute surgical intervention indicated.       BL sacral justice fx extending from S1 to S3 and R L5 transverse process fx s/p mechanical fall  - Ortho and NSGY following  - No concern for cauda equina or surgical intervention per chart review  - C/w conservative management at this time  - WBAT bl LEs with LSO brace  - Pain control as below     HLD  - Lipitor 20 Qd    Pain   - Tylenol 975 q8  - Ibuprofen 600 q6 PRN for mild pain  - Lidocaine patch PRN    Diet/ Bowel regimen  - Regular diet  - Consider miralax/senna PRN     Bowel/bladder  - No reported bowel/bladder incontinence  - Presley in place  - Consider TOV when able and bladder training with timed voids as appropriate    DVT PPX  - SCDs    Impaired Mobility/Function/Rehab Recommendations   - WBAT bilateral lower extremities with LSO brace   - Seen by PT and is mod assist with mobility and transfers. Ambulated 25 feet with RW mod assist.  - F/u OT eval when able  - Patient may benefit from acute rehab given new functional deficits in mobility, transfers, gait, balance and limited help at home with need for stair training in order for navigation at [facility].  - Recommend OOB to chair and ongoing mobilization as tolerated with assistance while inpatient to prevent debility  HPI:  Patient is a 79y Male presenting to the emergency department with a chief complaint low back and pelvic pain after a fall last week. Patient reports he sustained a mechanical ground level fall landing on his buttocks. Patient states the pain resolved and he was able to ambulate on his own without issue. However, several days later he had worsening pain and presented to [facility].  CT pelvis and lumbar spine significant for left and right sacral fxs with the right extended into S1-S2 neural foramen, and L5 TP fx. Patient c/o back pain, no other issues or concerns at this time. Neurosurgery evaluation requested in addition to orthopedics consulted.           Imaging Reviewed Today:  CT L-spine 12/23  1. L5 right transverse process nondisplaced fracture    2. Right sacral fracture is comminuted and disrupts the anterior cortex   at the S2 level    3. Nondisplaced left sacral fracture    4. Multilevel lumbar degenerative disc disease includes grade 1 anterior   listhesis L4 on L5 contributing with additional degenerative features to   at least moderate degenerative central canal stenosis    5. Additional body imaging findings, see above    12/23 CT Pelvis  1.  Fractures of the bilateral sacral justice extending from S1 to S3.  2.  Fracture of the right L5 transverse process.    12/23 MR Spine  Acute sacral insufficiency fracture.  Multilevel degenerative changes as detailed in full report.    12/24 CXR, tib/fib/ankle XR  No fracture. Stable COPD fibrocalcific changes left apex.      ----------------------------------------------------------------------------------------  CHIEF COMPLAINT    sacral lumbar fx      ----------------------------------------------------------------------------------------  VITALS  T(C): 36.8 (12-26-24 @ 04:50), Max: 36.8 (12-25-24 @ 12:38)  HR: 75 (12-26-24 @ 04:50) (64 - 90)  BP: 131/64 (12-26-24 @ 04:50) (120/65 - 158/82)  RR: 18 (12-26-24 @ 04:50) (17 - 18)  SpO2: 96% (12-26-24 @ 04:50) (92% - 98%)  Wt(kg): --    PAST MEDICAL & SURGICAL HISTORY  Bipolar disorder    H/O gastric ulcer    MI (myocardial infarction)    History of gastrectomy          LABS  REVIEWED    CBC Full  -  ( 25 Dec 2024 06:30 )  WBC Count : 6.46 K/uL  RBC Count : 3.74 M/uL  Hemoglobin : 11.7 g/dL  Hematocrit : 35.9 %  Platelet Count - Automated : 210 K/uL  Mean Cell Volume : 96.0 fl  Mean Cell Hemoglobin : 31.3 pg  Mean Cell Hemoglobin Concentration : 32.6 g/dL  Auto Neutrophil # : 5.19 K/uL  Auto Lymphocyte # : 0.52 K/uL  Auto Monocyte # : 0.55 K/uL  Auto Eosinophil # : 0.14 K/uL  Auto Basophil # : 0.03 K/uL  Auto Neutrophil % : 80.3 %  Auto Lymphocyte % : 8.0 %  Auto Monocyte % : 8.5 %  Auto Eosinophil % : 2.2 %  Auto Basophil % : 0.5 %    12-25    139  |  103  |  17.0  ----------------------------<  91  3.7   |  26.0  |  0.70    Ca    8.6      25 Dec 2024 06:30    TPro  5.2[L]  /  Alb  2.7[L]  /  TBili  0.5  /  DBili  x   /  AST  23  /  ALT  26  /  AlkPhos  118  12-25    Urinalysis Basic - ( 25 Dec 2024 06:30 )    Color: x / Appearance: x / SG: x / pH: x  Gluc: 91 mg/dL / Ketone: x  / Bili: x / Urobili: x   Blood: x / Protein: x / Nitrite: x   Leuk Esterase: x / RBC: x / WBC x   Sq Epi: x / Non Sq Epi: x / Bacteria: x        ALLERGIES  No Known Allergies      MEDICATIONS   acetaminophen     Tablet .. 975 milliGRAM(s) Oral every 8 hours  aspirin enteric coated 81 milliGRAM(s) Oral daily  atorvastatin 20 milliGRAM(s) Oral at bedtime  ibuprofen  Tablet. 600 milliGRAM(s) Oral every 6 hours PRN  lidocaine   4% Patch 1 Patch Transdermal every 24 hours  ondansetron Injectable 4 milliGRAM(s) IV Push every 6 hours PRN        ----------------------------------------------------------------------------------------  FUNCTIONAL HISTORY  Pt reports independent with RW PTA, admits to recent falls in the house.   Owns RW, SAC, shower chair.   Pt lives with wife in private home, 3 MARIO without handrails, 4 steps with rails to bathroom. Pt reports wife can assist if needed.    CURRENT FUNCTIONAL STATUS  12/25 PT    Bed Mobility: Rolling/Turning:     · Level of [place]	moderate assist (50% patients effort)  · Physical Assist/Nonphysical Assist	1 person assist; verbal cues    Bed Mobility: Sit to Supine:     · Level of [place]	minimum assist (75% patients effort)  · Physical Assist/Nonphysical Assist	1 person assist; verbal cues    Bed Mobility: Supine to Sit:     · Level of [place]	moderate assist (50% patients effort)  · Physical Assist/Nonphysical Assist	1 person assist; verbal cues    Transfer: Sit to Stand:     · Level of [place]	moderate assist (50% patients effort)  · Physical Assist/Nonphysical Assist	verbal cues; 1 person assist  · Weight-Bearing Restrictions	weight-bearing as tolerated  · Assistive Device	rolling walker    Transfer: Stand to Sit:     · Level of [place]	moderate assist (50% patients effort)  · Physical Assist/Nonphysical Assist	verbal cues; 1 person assist  · Weight-Bearing Restrictions	weight-bearing as tolerated  · Assistive Device	rolling walker    Gait Skills:     · Level of [place]	moderate assist (50% patients effort)  · Physical Assist/Nonphysical Assist	1 person assist; verbal cues; VCs for proximity to walker  · Weight-Bearing Restrictions	weight-bearing as tolerated  · Assistive Device	rolling walker  · Gait Distance	25 feet  · Brace/Orthotics	LSO    ----------------------------------------------------------------------------------------  PHYSICAL EXAM  Constitutional - NAD, Comfortable  HEENT - NCAT, EOM grossly intact  Neck - Supple, No limited ROM  Chest - Breathing comfortably, No wheezing  Cardiovascular - S1S2   Abdomen - Soft, NT/ND  Extremities - No C/C/E, No calf tenderness   Neurologic Exam -                    Cognitive - AAO to self, place, date, year, situation     Communication - Fluent, No dysarthria     Cranial Nerves - CN grossly intact     FUNCTIONAL MOTOR EXAM -                     LEFT    UE - ShAB 5/5, EF 5/5, EE 5/5, WE 5/5,  5/5                    RIGHT UE - ShAB 5/5, EF 5/5, EE 5/5, WE 5/5,  5/5                    LEFT    LE - HF 4/5, KE 4/5, DF 5/5, PF 5/5                    RIGHT LE - HF 4/5, KE 4/5, DF 5/5, PF 5/5        Sensory - Intact to LT  Psychiatric - Mood stable, Affect WNL  ----------------------------------------------------------------------------------------  ASSESSMENT/PLAN  79yMale with functional deficits after fall with bl sacaral fracture extending into s1-s2 neural foramen. Orthopedics consulted and no acute surgical intervention indicated.       BL sacral justice fx extending from S1 to S3 and R L5 transverse process fx s/p mechanical fall  - Ortho and NSGY following  - No concern for cauda equina or surgical intervention per chart review  - C/w conservative management at this time  - WBAT bl LEs with LSO brace  - Pain control as below     HLD  - Lipitor 20 Qd    Pain   - Tylenol 975 q8  - Ibuprofen 600 q6 PRN for mild pain  - Lidocaine patch PRN    Diet/ Bowel regimen  - Regular diet  - Consider miralax/senna PRN     Bowel/bladder  - No reported bowel/bladder incontinence  - Presley in place  - Consider TOV when able and bladder training with timed voids as appropriate    DVT PPX  - SCDs    Impaired Mobility/Function/Rehab Recommendations   - WBAT bilateral lower extremities with LSO brace   - Seen by PT and is mod assist with mobility and transfers. Ambulated 25 feet with RW mod assist.  - F/u OT eval when able  - Recommend acute rehab as patient meets criteria and would be able to tolerate 3 hours of therapy 5xweek. Patient would benefit from this comprehensive rehabiliation program for training given new functional deficits in mobility, transfers, gait, balance and limited help at home with need for stair training in order for navigation at home.   - Recommend OOB to chair and ongoing mobilization as tolerated with assistance while inpatient to prevent debility

## 2024-12-27 PROCEDURE — 99233 SBSQ HOSP IP/OBS HIGH 50: CPT | Mod: GC

## 2024-12-27 RX ADMIN — LIDOCAINE 1 PATCH: 50 OINTMENT TOPICAL at 19:30

## 2024-12-27 RX ADMIN — ACETAMINOPHEN 975 MILLIGRAM(S): 80 SOLUTION/ DROPS ORAL at 00:25

## 2024-12-27 RX ADMIN — ACETAMINOPHEN 975 MILLIGRAM(S): 80 SOLUTION/ DROPS ORAL at 21:38

## 2024-12-27 RX ADMIN — LIDOCAINE 1 PATCH: 50 OINTMENT TOPICAL at 11:32

## 2024-12-27 RX ADMIN — LIDOCAINE 1 PATCH: 50 OINTMENT TOPICAL at 23:30

## 2024-12-27 RX ADMIN — ACETAMINOPHEN 975 MILLIGRAM(S): 80 SOLUTION/ DROPS ORAL at 11:31

## 2024-12-27 RX ADMIN — Medication 81 MILLIGRAM(S): at 11:31

## 2024-12-27 RX ADMIN — LIDOCAINE 1 PATCH: 50 OINTMENT TOPICAL at 00:00

## 2024-12-27 RX ADMIN — ACETAMINOPHEN 975 MILLIGRAM(S): 80 SOLUTION/ DROPS ORAL at 12:31

## 2024-12-27 RX ADMIN — ACETAMINOPHEN 975 MILLIGRAM(S): 80 SOLUTION/ DROPS ORAL at 06:15

## 2024-12-27 RX ADMIN — ACETAMINOPHEN 975 MILLIGRAM(S): 80 SOLUTION/ DROPS ORAL at 05:24

## 2024-12-27 RX ADMIN — ATORVASTATIN CALCIUM 20 MILLIGRAM(S): 40 TABLET, FILM COATED ORAL at 21:38

## 2024-12-27 RX ADMIN — ACETAMINOPHEN 975 MILLIGRAM(S): 80 SOLUTION/ DROPS ORAL at 01:25

## 2024-12-27 NOTE — PROGRESS NOTE ADULT - ATTENDING COMMENTS
Patient seen and examined. Case discussed with Dr. Ramsey and agree with recommendations outlined above. I have personally reviewed the imaging and labs listed above.      Rehab/Impaired mobility and function - Patient continues to require hospitalization for the above diagnoses and ongoing active management of comorbid complications that are substantially posing a threat to bodily function, functional ability and quality of life, necessitating transfer of hospitalization at an acute inpatient rehabilitation facility.     When medically optimized, based on the patient's diagnosis, current functional status and potential for progress, recommend ACUTE inpatient rehabilitation for the functional deficits consisting of 3 hours of therapy/day & 24 hour RN/daily PMR physician for active comorbid medical management. Patient will be able to tolerate 3 hours a day.     Will need a PT and OT follow-up within 48 hrs discharge.     Will continue to follow. Rehab recommendations are dependent on how functional progress changes as well as how patient continues to participate and tolerate therapeutic interventions, WHICH MAY CHANGE UPON FOLLOW UP EVALUATIONS. Recommend ongoing mobilization by staff to maintain cardiopulmonary function and prevention of secondary complications related to debility. Have discussed the specific rehabilitation management and recommendations above with rehab clinical care team/rehab liaison.      Total Time Spent on Encounter (reviewing clinical notes, labs, radiology, medications, patient history/exam, assessment and plan) - 50 minutes which excludes teaching and separately reported services.

## 2024-12-27 NOTE — PROGRESS NOTE ADULT - SUBJECTIVE AND OBJECTIVE BOX
CC: Patient being seen for rehabilitation follow up.  Reports feeling well this AM.  Denies any pain or any questions/concerns. Motivated to begin acute rehab.      FUNCTIONAL PROGRESS  12/26 PT  Bed Mobility  Bed Mobility Training Supine-to-Sit: contact guard;  1 person assist  Bed Mobility Training Limitations: decreased strength;  pain    Sit-Stand Transfer Training  Transfer Training Sit-to-Stand Transfer: contact guard;  1 person assist;  full weight-bearing   rolling walker  Transfer Training Stand-to-Sit Transfer: contact guard;  1 person assist;  full weight-bearing   rolling walker  Sit-to-Stand Transfer Training Transfer Safety Analysis: decreased strength;  pain    Gait Training  Gait Training: contact guard;  1 person assist;  full weight-bearing   rolling walker;  40 feet   Gait Analysis: swing-through gait   decreased ariana;  decreased step length;  decreased stride length;  Decreased ROM, Decreased Strength, Pain     12/25 PT    Bed Mobility: Rolling/Turning:     · Level of [place]	moderate assist (50% patients effort)  · Physical Assist/Nonphysical Assist	1 person assist; verbal cues    Bed Mobility: Sit to Supine:     · Level of [place]	minimum assist (75% patients effort)  · Physical Assist/Nonphysical Assist	1 person assist; verbal cues    Bed Mobility: Supine to Sit:     · Level of [place]	moderate assist (50% patients effort)  · Physical Assist/Nonphysical Assist	1 person assist; verbal cues    Transfer: Sit to Stand:     · Level of [place]	moderate assist (50% patients effort)  · Physical Assist/Nonphysical Assist	verbal cues; 1 person assist  · Weight-Bearing Restrictions	weight-bearing as tolerated  · Assistive Device	rolling walker    Transfer: Stand to Sit:     · Level of [place]	moderate assist (50% patients effort)  · Physical Assist/Nonphysical Assist	verbal cues; 1 person assist  · Weight-Bearing Restrictions	weight-bearing as tolerated  · Assistive Device	rolling walker    Gait Skills:     · Level of [place]	moderate assist (50% patients effort)  · Physical Assist/Nonphysical Assist	1 person assist; verbal cues; VCs for proximity to walker  · Weight-Bearing Restrictions	weight-bearing as tolerated  · Assistive Device	rolling walker  · Gait Distance	25 feet  · Brace/Orthotics	LSO        VITALS  T(C): 36.6 (12-27-24 @ 04:48), Max: 37 (12-27-24 @ 00:20)  HR: 84 (12-27-24 @ 04:48) (76 - 99)  BP: 136/67 (12-27-24 @ 04:48) (122/62 - 160/74)  RR: 18 (12-27-24 @ 04:48) (16 - 18)  SpO2: 98% (12-27-24 @ 04:48) (94% - 98%)  Wt(kg): --    MEDICATIONS   acetaminophen     Tablet .. 975 milliGRAM(s) every 8 hours  aspirin enteric coated 81 milliGRAM(s) daily  atorvastatin 20 milliGRAM(s) at bedtime  ibuprofen  Tablet. 600 milliGRAM(s) every 6 hours PRN  lidocaine   4% Patch 1 Patch every 24 hours  ondansetron Injectable 4 milliGRAM(s) every 6 hours PRN      RECENT LABS/IMAGING  - Reviewed Today    Imaging Reviewed Today:  CT L-spine 12/23  1. L5 right transverse process nondisplaced fracture    2. Right sacral fracture is comminuted and disrupts the anterior cortex   at the S2 level    3. Nondisplaced left sacral fracture    4. Multilevel lumbar degenerative disc disease includes grade 1 anterior   listhesis L4 on L5 contributing with additional degenerative features to   at least moderate degenerative central canal stenosis    5. Additional body imaging findings, see above    12/23 CT Pelvis  1.  Fractures of the bilateral sacral justice extending from S1 to S3.  2.  Fracture of the right L5 transverse process.    12/23 MR Spine  Acute sacral insufficiency fracture.  Multilevel degenerative changes as detailed in full report.    12/24 CXR, tib/fib/ankle XR  No fracture. Stable COPD fibrocalcific changes left apex.        PHYSICAL EXAM  Constitutional - NAD, Comfortable  HEENT - NCAT, EOM grossly intact  Neck - Supple, No limited ROM  Chest - Breathing comfortably, No wheezing  Cardiovascular - S1S2   Abdomen - Soft, NT/ND  Extremities - No C/C/E, No calf tenderness   Neurologic Exam -                    Cognitive - AAO to self, place, date, year, situation     Communication - Fluent, No dysarthria     Cranial Nerves - CN grossly intact     FUNCTIONAL MOTOR EXAM -                     LEFT    UE - ShAB 5/5, EF 5/5, EE 5/5, WE 5/5,  5/5                    RIGHT UE - ShAB 5/5, EF 5/5, EE 5/5, WE 5/5,  5/5                    LEFT    LE - HF 4/5, KE 4/5, DF 5/5, PF 5/5                    RIGHT LE - HF 4/5, KE 4/5, DF 5/5, PF 5/5        Sensory - Intact to LT  Psychiatric - Mood stable, Affect WNL  ----------------------------------------------------------------------------------------  ASSESSMENT/PLAN  79yMale with functional deficits after fall with bl sacaral fracture extending into s1-s2 neural foramen. Orthopedics consulted and no acute surgical intervention indicated.       BL sacral justice fx extending from S1 to S3 and R L5 transverse process fx s/p mechanical fall  - Ortho and NSGY following  - No concern for cauda equina or surgical intervention per chart review  - C/w conservative management at this time  - WBAT bl LEs with LSO brace  - Pain control as below     HLD  - Lipitor 20 Qd    Pain   - Tylenol 975 q8  - Ibuprofen 600 q6 PRN for mild pain  - Lidocaine patch PRN    Diet/ Bowel regimen  - Regular diet  - Consider miralax/senna PRN     Bowel/bladder  - No reported bowel/bladder incontinence  - Presley in place  - Consider TOV when able and bladder training with timed voids as appropriate    DVT PPX  - SCDs    Impaired Mobility/Function/Rehab Recommendations   - WBAT bilateral lower extremities with LSO brace   - Seen by PT and is mod assist with mobility and transfers. Ambulated 25 feet with RW mod assist.  - F/u OT eval when able  - Recommend acute rehab as patient meets criteria and would be able to tolerate 3 hours of therapy 5xweek. Patient would benefit from this comprehensive rehabiliation program for training given new functional deficits in mobility, transfers, gait, balance and limited help at home with need for stair training in order for navigation at home.   - Recommend OOB to chair and ongoing mobilization as tolerated with assistance while inpatient to prevent debility         CC: Patient being seen for rehabilitation follow up.  Reports feeling well this AM. Was able to ambulate 40 ft with PT using RW yesterday without pain/complaints.  Denies any pain or any questions/concerns. Reports performing in bed exercises and is motivated to begin acute rehab.      FUNCTIONAL PROGRESS  12/26 PT  Bed Mobility  Bed Mobility Training Supine-to-Sit: contact guard;  1 person assist  Bed Mobility Training Limitations: decreased strength;  pain    Sit-Stand Transfer Training  Transfer Training Sit-to-Stand Transfer: contact guard;  1 person assist;  full weight-bearing   rolling walker  Transfer Training Stand-to-Sit Transfer: contact guard;  1 person assist;  full weight-bearing   rolling walker  Sit-to-Stand Transfer Training Transfer Safety Analysis: decreased strength;  pain    Gait Training  Gait Training: contact guard;  1 person assist;  full weight-bearing   rolling walker;  40 feet   Gait Analysis: swing-through gait   decreased ariana;  decreased step length;  decreased stride length;  Decreased ROM, Decreased Strength, Pain     12/25 PT    Bed Mobility: Rolling/Turning:     · Level of [place]	moderate assist (50% patients effort)  · Physical Assist/Nonphysical Assist	1 person assist; verbal cues    Bed Mobility: Sit to Supine:     · Level of [place]	minimum assist (75% patients effort)  · Physical Assist/Nonphysical Assist	1 person assist; verbal cues    Bed Mobility: Supine to Sit:     · Level of [place]	moderate assist (50% patients effort)  · Physical Assist/Nonphysical Assist	1 person assist; verbal cues    Transfer: Sit to Stand:     · Level of [place]	moderate assist (50% patients effort)  · Physical Assist/Nonphysical Assist	verbal cues; 1 person assist  · Weight-Bearing Restrictions	weight-bearing as tolerated  · Assistive Device	rolling walker    Transfer: Stand to Sit:     · Level of [place]	moderate assist (50% patients effort)  · Physical Assist/Nonphysical Assist	verbal cues; 1 person assist  · Weight-Bearing Restrictions	weight-bearing as tolerated  · Assistive Device	rolling walker    Gait Skills:     · Level of [place]	moderate assist (50% patients effort)  · Physical Assist/Nonphysical Assist	1 person assist; verbal cues; VCs for proximity to walker  · Weight-Bearing Restrictions	weight-bearing as tolerated  · Assistive Device	rolling walker  · Gait Distance	25 feet  · Brace/Orthotics	LSO        VITALS  T(C): 36.6 (12-27-24 @ 04:48), Max: 37 (12-27-24 @ 00:20)  HR: 84 (12-27-24 @ 04:48) (76 - 99)  BP: 136/67 (12-27-24 @ 04:48) (122/62 - 160/74)  RR: 18 (12-27-24 @ 04:48) (16 - 18)  SpO2: 98% (12-27-24 @ 04:48) (94% - 98%)  Wt(kg): --    MEDICATIONS   acetaminophen     Tablet .. 975 milliGRAM(s) every 8 hours  aspirin enteric coated 81 milliGRAM(s) daily  atorvastatin 20 milliGRAM(s) at bedtime  ibuprofen  Tablet. 600 milliGRAM(s) every 6 hours PRN  lidocaine   4% Patch 1 Patch every 24 hours  ondansetron Injectable 4 milliGRAM(s) every 6 hours PRN      RECENT LABS/IMAGING  - Reviewed Today    Imaging Reviewed Today:  CT L-spine 12/23  1. L5 right transverse process nondisplaced fracture    2. Right sacral fracture is comminuted and disrupts the anterior cortex   at the S2 level    3. Nondisplaced left sacral fracture    4. Multilevel lumbar degenerative disc disease includes grade 1 anterior   listhesis L4 on L5 contributing with additional degenerative features to   at least moderate degenerative central canal stenosis    5. Additional body imaging findings, see above    12/23 CT Pelvis  1.  Fractures of the bilateral sacral justice extending from S1 to S3.  2.  Fracture of the right L5 transverse process.    12/23 MR Spine  Acute sacral insufficiency fracture.  Multilevel degenerative changes as detailed in full report.    12/24 CXR, tib/fib/ankle XR  No fracture. Stable COPD fibrocalcific changes left apex.        PHYSICAL EXAM  Constitutional - NAD, Comfortable  HEENT - NCAT, EOM grossly intact  Neck - Supple, No limited ROM  Chest - Breathing comfortably on RA  Cardiovascular - warm and well perfused  Abdomen - Soft, NT/ND  Extremities - No C/C/E, No calf tenderness   Neurologic Exam -                    Cognitive - AAO to self, place, date, year, situation     Cranial Nerves - CN grossly intact     FUNCTIONAL MOTOR EXAM -                     5/5 in BL Upper extremities.                    4/5 HF and KE in bl lower extremities. 5/5 DF/PF bilaterally.     Sensory - Intact to LT  Psychiatric - Mood stable, Affect WNL  ----------------------------------------------------------------------------------------  ASSESSMENT/PLAN  79yMale with functional deficits after fall with bl sacaral fracture extending into s1-s2 neural foramen. Orthopedics consulted and no acute surgical intervention indicated.       BL sacral justice fx extending from S1 to S3 and R L5 transverse process fx s/p mechanical fall  - Ortho and NSGY following  - No concern for cauda equina or indication for surgical intervention   - C/w conservative management at this time  - WBAT bl LEs with LSO brace  - Pain control as below     Pain   - Tylenol 975 q8  - Ibuprofen 600 q6 PRN for mild pain  - Lidocaine patch PRN    HLD  - Lipitor 20 Qd    Diet/ Bowel regimen  - Regular diet  - Consider miralax/senna PRN     Bowel/bladder  - No reported bowel/bladder incontinence  - Presley in place for urinary retention  - Consider TOV when able and bladder training with timed voids as appropriate    DVT PPX  - SCDs    Impaired Mobility/Function/Rehab Recommendations   - WBAT bilateral lower extremities with LSO brace  - Seen by PT and has made good progress. Most recently seen on 12/26 and is CG with mobility/transfers. Also able to ambulate 40 feet with RW, CG.  - Pending OT evaluation  - Recommend acute rehab as patient meets criteria and would be able to tolerate 3 hours of therapy 5xweek. Patient would benefit from this comprehensive rehabilitation program for training given new functional deficits in mobility, transfers, gait, balance and limited help at home with additional need for stair training in order for navigation at home.   - Recommend OOB to chair and ongoing mobilization, as tolerated with assistance, and in-bed exercises while inpatient to prevent debility

## 2024-12-27 NOTE — OCCUPATIONAL THERAPY INITIAL EVALUATION ADULT - PERTINENT HX OF CURRENT PROBLEM, REHAB EVAL
As per MD note: 79y Male with history of HLD, CAD s/p stents (no AC/AP), pancreatitis and PSH of partial gastrectomy 2/2 perf gastric ulcer presents to the ED as a transfer from [place] after sustaining and mechanical ground level fall. Patient states that he slipped and fell last week, landing on his buttocks. Patient states the pain resolved and he was able to ambulate on his own without issue. However, 2 days prior to presentation he states that he went to use the bathroom and had bowel movements without knowing or having the urge to. He is able to control urination. He presentedt [facility] due to increasing pain in his back. In the ED, investigations significant for normal labs, CT pelvis and lumbar spine significant for left and right sacral fxs with the right extended into S1-S2 neural foramen, and L5 TP fx. Patient c/o back pain, no other issues or concerns at this time.

## 2024-12-28 ENCOUNTER — TRANSCRIPTION ENCOUNTER (OUTPATIENT)
Age: 79
End: 2024-12-28

## 2024-12-28 VITALS
SYSTOLIC BLOOD PRESSURE: 154 MMHG | RESPIRATION RATE: 18 BRPM | OXYGEN SATURATION: 97 % | DIASTOLIC BLOOD PRESSURE: 73 MMHG | HEART RATE: 86 BPM | TEMPERATURE: 97 F

## 2024-12-28 PROCEDURE — 97116 GAIT TRAINING THERAPY: CPT

## 2024-12-28 PROCEDURE — 99285 EMERGENCY DEPT VISIT HI MDM: CPT

## 2024-12-28 PROCEDURE — 93005 ELECTROCARDIOGRAM TRACING: CPT

## 2024-12-28 PROCEDURE — 73590 X-RAY EXAM OF LOWER LEG: CPT

## 2024-12-28 PROCEDURE — 99231 SBSQ HOSP IP/OBS SF/LOW 25: CPT

## 2024-12-28 PROCEDURE — 73600 X-RAY EXAM OF ANKLE: CPT

## 2024-12-28 PROCEDURE — 85025 COMPLETE CBC W/AUTO DIFF WBC: CPT

## 2024-12-28 PROCEDURE — 71045 X-RAY EXAM CHEST 1 VIEW: CPT

## 2024-12-28 PROCEDURE — 72158 MRI LUMBAR SPINE W/O & W/DYE: CPT | Mod: MC

## 2024-12-28 PROCEDURE — 36415 COLL VENOUS BLD VENIPUNCTURE: CPT

## 2024-12-28 PROCEDURE — 80053 COMPREHEN METABOLIC PANEL: CPT

## 2024-12-28 PROCEDURE — 97167 OT EVAL HIGH COMPLEX 60 MIN: CPT

## 2024-12-28 PROCEDURE — 99233 SBSQ HOSP IP/OBS HIGH 50: CPT

## 2024-12-28 PROCEDURE — 97110 THERAPEUTIC EXERCISES: CPT

## 2024-12-28 RX ORDER — LIDOCAINE 50 MG/G
1 OINTMENT TOPICAL
Qty: 24 | Refills: 0
Start: 2024-12-28 | End: 2025-01-06

## 2024-12-28 RX ADMIN — Medication 600 MILLIGRAM(S): at 11:14

## 2024-12-28 RX ADMIN — ACETAMINOPHEN 975 MILLIGRAM(S): 80 SOLUTION/ DROPS ORAL at 05:16

## 2024-12-28 RX ADMIN — Medication 81 MILLIGRAM(S): at 11:14

## 2024-12-28 NOTE — DISCHARGE NOTE PROVIDER - NSDCFUSCHEDAPPT_GEN_ALL_CORE_FT
João Keating  [facility] Physician [facility]  CARDIOLOGY [phone]  Scheduled Appointment: 03/26/2025

## 2024-12-28 NOTE — DISCHARGE NOTE PROVIDER - NSDCMRMEDTOKEN_GEN_ALL_CORE_FT
ascorbic acid 500 mg oral tablet: 1 tab(s) orally once a day  aspirin 81 mg oral delayed release tablet: 2 tab(s) orally once a day  atorvastatin 20 mg oral tablet: 1 tab(s) orally once a day  cholecalciferol 25 mcg (1000 intl units) oral tablet: 1 tab(s) orally once a day  cyanocobalamin 50 mcg oral tablet: 1 tab(s) orally once a day  lidocaine 4% topical film: Apply topically to affected area every 8 hours  pancrelipase 36,000 units-114,000 units-180,000 units oral delayed release capsule: 2 cap(s) orally 3 times a day (before meals)

## 2024-12-28 NOTE — PROGRESS NOTE ADULT - REASON FOR ADMISSION
R/O caudae equinae 2/2 fall

## 2024-12-28 NOTE — DISCHARGE NOTE NURSING/CASE MANAGEMENT/SOCIAL WORK - PATIENT PORTAL LINK FT
You can access the FollowMyHealth Patient Portal offered by [facility] by registering at the following website: [email or web address]. By joining [facility]’s FollowMyHealth portal, you will also be able to view your health information using other applications (apps) compatible with our system.

## 2024-12-28 NOTE — DISCHARGE NOTE PROVIDER - NSFOLLOWUPCLINICS_GEN_ALL_ED_FT
[facility] Acute Care Surgery  Acute Care Surgery  [address] [place] [id number]  Phone: [phone]  Fax:

## 2024-12-28 NOTE — PROGRESS NOTE ADULT - ASSESSMENT
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 Patient states that he slipped and fell last week, landing on his buttocks. Patient states the pain resolved and he was able to ambulate on his own without issue. However, 2 days prior to presentation he states that he went to use the bathroom and had bowel movements without knowing or having the urge to. He is able to control urination. He presentedt [facility] due to increasing pain in his back. In the ED, investigations significant for normal labs, CT pelvis and lumbar spine significant for left and right sacral fxs with the right extended into S1-S2 neural foramen, and L5 TP fx. Patient c/o back pain, no other issues or concerns at this time.     Plan:  -reg diet   -MRI L-spine done, LSO brace when out of bed as per neurosurgery   -Q4 neurochecks  - DC to acute rehab , PMR and OT notes appreciated   -awaiting bed 
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 Patient states that he slipped and fell last week, landing on his buttocks. Patient states the pain resolved and he was able to ambulate on his own without issue. However, 2 days prior to presentation he states that he went to use the bathroom and had bowel movements without knowing or having the urge to. He is able to control urination. He presentedt [facility] due to increasing pain in his back. In the ED, investigations significant for normal labs, CT pelvis and lumbar spine significant for left and right sacral fxs with the right extended into S1-S2 neural foramen, and L5 TP fx. Patient c/o back pain, no other issues or concerns at this time.    Plan:  -NPO  -NSx consultation  -MRI L-spine  -LLE XR  -Q4 neurochecks

## 2024-12-28 NOTE — DISCHARGE NOTE PROVIDER - HOSPITAL COURSE
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 Patient stated that he slipped and fell last week, landing on his buttocks. Patient stated the pain resolved and he was able to ambulate on his own without issue. However, 2 days prior to presentation he states that he went to use the bathroom and had bowel movements without knowing or having the urge to. He is able to control urination. He presented to [place] due to increasing pain in his back. Pt was found to have a left and right sacral fxs with the right extended into S1-S2 neural foramen, and L5 TP fx. Neurosurgery was consulted to rule out cauda equina and recommended no surgical intervention required at this time due to no concern for it. Ortho recommended a LSO brace when out of bed for the sacral fracture. Patient was evaluated by PT and OT and was recommended acute rehab. Patient was found to have Right upper lobe airspace consolidation with smaller areas of ground glass and centrilobular/tree-in-bud nodular opacities during tertiary survey and is recommended a follow up CT scan outpatient in 10-12 weeks.     On the day of discharge, patient's grigsby was discontinued, patient passed TOV, patient is tolerating diet, pain is controlled and patient is ready discharge to acute rehab.

## 2024-12-28 NOTE — PROGRESS NOTE ADULT - SUBJECTIVE AND OBJECTIVE BOX
Subjective:       STATUS POST:      POST OPERATIVE DAY #:     MEDICATIONS  (STANDING):  acetaminophen     Tablet .. 975 milliGRAM(s) Oral every 8 hours  aspirin enteric coated 81 milliGRAM(s) Oral daily  atorvastatin 20 milliGRAM(s) Oral at bedtime  lidocaine   4% Patch 1 Patch Transdermal every 24 hours    MEDICATIONS  (PRN):  ibuprofen  Tablet. 600 milliGRAM(s) Oral every 6 hours PRN Mild Pain (1 - 3)  ondansetron Injectable 4 milliGRAM(s) IV Push every 6 hours PRN Nausea      Vital Signs Last 24 Hrs  T(C): 36.7 (27 Dec 2024 23:27), Max: 36.9 (27 Dec 2024 15:11)  T(F): 98 (27 Dec 2024 23:27), Max: 98.5 (27 Dec 2024 19:00)  HR: 67 (27 Dec 2024 23:27) (67 - 100)  BP: 140/69 (27 Dec 2024 23:27) (114/60 - 148/85)  BP(mean): --  RR: 18 (27 Dec 2024 23:27) (18 - 18)  SpO2: 97% (27 Dec 2024 23:27) (94% - 98%)    Parameters below as of 27 Dec 2024 23:27  Patient On (Oxygen Delivery Method): room air        Physical Exam:    Constitutional: NAD  Gastrointestinal: Soft, non-tender  Extremities: No peripheral edema, No cyanosis  Neurological: GCS: 15        LABS:

## 2024-12-28 NOTE — DISCHARGE NOTE NURSING/CASE MANAGEMENT/SOCIAL WORK - FINANCIAL ASSISTANCE
[facility] provides services at a reduced cost to those who are determined to be eligible through [facility]’s financial assistance program. Information regarding [facility]’s financial assistance program can be found by going to [email or web address] or by calling [phone].

## 2024-12-28 NOTE — PROGRESS NOTE ADULT - SUBJECTIVE AND OBJECTIVE BOX
CC: Patient being seen for rehabilitation follow up.  Patient feels well.  Pain is a bit controlled.  Pending DC today to [facility].     FUNCTIONAL PROGRESS  12/27 PT  Bed Mobility  Bed Mobility Training Supine-to-Sit: minimum assist (75% patient effort);  1 person assist  Bed Mobility Training Limitations: Decreased ROM, Decreased Strength, Pain     Sit-Stand Transfer Training  Transfer Training Sit-to-Stand Transfer: moderate assist (50% patient effort);  1 person assist;  full weight-bearing   rolling walker  Transfer Training Stand-to-Sit Transfer: minimum assist (75% patient effort);  1 person assist;  full weight-bearing   rolling walker  Sit-to-Stand Transfer Training Transfer Safety Analysis: Decreased ROM, Decreased Strength, Pain     Gait Training  Gait Training: minimum assist (75% patient effort);  verbal cues;  1 person assist;  full weight-bearing   rolling walker;  25 feet  Gait Analysis: swing-to gait   decreased ariana;  decreased step length;  decreased stride length;  Decreased ROM, Decreased Strength, Pain     12/27 OT  Bathing Training:     · Level of [place]	moderate assist (50% patients effort)    Upper Body Dressing Training:     · Level of [place]	minimum assist (75% patients effort)    Lower Body Dressing Training:     · Level of [place]	maximum assist (25% patients effort)    Toilet Hygiene Training:     · Level of [place]	minimum assist (75% patients effort)    Grooming Training:     · Level of [place]	supervision    Eating/Self-Feeding Training:     · Level of [place]	N/A    VITALS  T(C): 36.6 (12-28-24 @ 08:52), Max: 36.9 (12-27-24 @ 15:11)  HR: 72 (12-28-24 @ 08:52) (57 - 100)  BP: 149/77 (12-28-24 @ 08:52) (114/60 - 150/74)  RR: 16 (12-28-24 @ 08:52) (16 - 18)  SpO2: 96% (12-28-24 @ 08:52) (94% - 99%)  Wt(kg): --    MEDICATIONS   acetaminophen     Tablet .. 975 milliGRAM(s) every 8 hours  aspirin enteric coated 81 milliGRAM(s) daily  atorvastatin 20 milliGRAM(s) at bedtime  ibuprofen  Tablet. 600 milliGRAM(s) every 6 hours PRN  lidocaine   4% Patch 1 Patch every 24 hours  ondansetron Injectable 4 milliGRAM(s) every 6 hours PRN      RECENT LABS/IMAGING  - Reviewed Today                    CT L-spine 12/23  1. L5 right transverse process nondisplaced fracture    2. Right sacral fracture is comminuted and disrupts the anterior cortex   at the S2 level    3. Nondisplaced left sacral fracture    4. Multilevel lumbar degenerative disc disease includes grade 1 anterior   listhesis L4 on L5 contributing with additional degenerative features to   at least moderate degenerative central canal stenosis    5. Additional body imaging findings, see above    12/23 CT Pelvis  1.  Fractures of the bilateral sacral justice extending from S1 to S3.  2.  Fracture of the right L5 transverse process.    12/23 MR Spine  Acute sacral insufficiency fracture.  Multilevel degenerative changes as detailed in full report.    12/24 CXR, tib/fib/ankle XR  No fracture. Stable COPD fibrocalcific changes left apex.      ----------------------------------------------------------------------------------------  PHYSICAL EXAM  Constitutional - NAD, Comfortable    Extremities - No C/C/E, No calf tenderness   Neurologic Exam -                    Cognitive - AAO to self, place, date, year, situation      FUNCTIONAL MOTOR EXAM -                     5/5 in BL Upper extremities.                    4/5 HF and KE in bl lower extremities. 5/5 DF/PF bilaterally.     Sensory - Intact to LT  Psychiatric - Mood stable, Affect WNL  ----------------------------------------------------------------------------------------  ASSESSMENT/PLAN  79yMale with functional deficits after fall with bl sacaral fracture extending into s1-s2 neural foramen. Orthopedics consulted and no acute surgical intervention indicated.     BL sacral justice fx extending from S1 to S3 and R L5 transverse process fx s/p mechanical fall  - Ortho and NSGY following  - No concern for cauda equina or indication for surgical intervention   - C/w conservative management at this time  - WBAT bl LEs with LSO brace  - Pain control as below     Pain   - Tylenol 975 q8  - Ibuprofen 600 q6 PRN for mild pain  - Lidocaine patch PRN    HLD  - Lipitor 20 Qd    Diet/ Bowel regimen  - Regular diet  - Consider miralax/senna PRN     Bowel/bladder  - No reported bowel/bladder incontinence  - Presley in place for urinary retention  - Consider TOV when able and bladder training with timed voids as appropriate    DVT PPX  - SCDs    Rehab/Impaired mobility and function - Patient continues to require hospitalization for the above diagnoses and ongoing active management of comorbid complications that are substantially posing a threat to bodily function, functional ability and quality of life, necessitating transfer of hospitalization at an acute inpatient rehabilitation facility.     When medically optimized, based on the patient's diagnosis, current functional status and potential for progress, recommend ACUTE inpatient rehabilitation for the functional deficits consisting of 3 hours of therapy/day & 24 hour RN/daily PMR physician for active comorbid medical management. Patient will be able to tolerate 3 hours a day.     Will need a PT and OT follow-up within 48 hrs discharge.     Will continue to follow. Rehab recommendations are dependent on how functional progress changes as well as how patient continues to participate and tolerate therapeutic interventions, WHICH MAY CHANGE UPON FOLLOW UP EVALUATIONS. Recommend ongoing mobilization by staff to maintain cardiopulmonary function and prevention of secondary complications related to debility. Have discussed the specific rehabilitation management and recommendations above with rehab clinical care team/rehab liaison.      Total Time Spent on Encounter (reviewing clinical notes, labs, radiology, medications, patient history/exam, assessment and plan) - 50 minutes

## 2024-12-28 NOTE — PROVIDER CONTACT NOTE (OTHER) - SITUATION
Notified by  that pt has bed in [facility]. Pt with grigsby cath, requested TOV. Physician agreed.

## 2024-12-28 NOTE — DISCHARGE NOTE PROVIDER - NSDCCPCAREPLAN_GEN_ALL_CORE_FT
PRINCIPAL DISCHARGE DIAGNOSIS  Diagnosis: Sacral fracture  Assessment and Plan of Treatment: 1) Regular diet as tolerated  2) Call  to make appointment in 2 weeks with the acute car surgery clinic   3) Over the Counter Tylenol and Ibuprofen (with food) for mild to moderate pain, narcotic prescription for severe pain  4) Call clinic with concerns regarding persistent fevers, severe pain not relieved with medications, persistent nausea/vomiting        SECONDARY DISCHARGE DIAGNOSES  Diagnosis: Lumbar transverse process fracture  Assessment and Plan of Treatment:

## 2025-01-16 ENCOUNTER — NON-APPOINTMENT (OUTPATIENT)
Age: 80
End: 2025-01-16

## 2025-02-04 DIAGNOSIS — S32.009A UNSPECIFIED FRACTURE OF UNSPECIFIED LUMBAR VERTEBRA, INITIAL ENCOUNTER FOR CLOSED FRACTURE: ICD-10-CM
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
## 2025-02-04 DIAGNOSIS — S32.10XA UNSPECIFIED FRACTURE OF SACRUM, INITIAL ENCOUNTER FOR CLOSED FRACTURE: ICD-10-CM

## 2025-02-04 RX ORDER — METOPROLOL TARTRATE 25 MG/1
25 TABLET ORAL TWICE DAILY
Qty: 45 | Refills: 3 | Status: ACTIVE | COMMUNITY
Start: 1900-01-01 | End: 1900-01-01

## 2025-02-11 ENCOUNTER — NON-APPOINTMENT (OUTPATIENT)
Age: 80
End: 2025-02-11

## 2025-03-18 DIAGNOSIS — I25.2 OLD MYOCARDIAL INFARCTION: ICD-10-CM

## 2025-03-18 RX ORDER — LIDOCAINE 4% 40 MG/G
4 PATCH TOPICAL
Refills: 0 | Status: ACTIVE | COMMUNITY

## 2025-03-18 RX ORDER — PANCRELIPASE 36000; 180000; 114000 [USP'U]/1; [USP'U]/1; [USP'U]/1
36000-114000 CAPSULE, DELAYED RELEASE PELLETS ORAL
Refills: 0 | Status: ACTIVE | COMMUNITY

## 2025-03-18 RX ORDER — MULTIVIT-MIN/FOLIC/VIT K/LYCOP 400-300MCG
500 TABLET ORAL DAILY
Refills: 0 | Status: ACTIVE | COMMUNITY

## 2025-03-26 ENCOUNTER — APPOINTMENT (OUTPATIENT)
Dept: CARDIOLOGY | Facility: CLINIC | Age: 80
End: 2025-03-26
Payer: MEDICARE

## 2025-03-26 ENCOUNTER — NON-APPOINTMENT (OUTPATIENT)
Age: 80
End: 2025-03-26

## 2025-03-26 VITALS
BODY MASS INDEX: 18.48 KG/M2 | WEIGHT: 144 LBS | HEART RATE: 61 BPM | DIASTOLIC BLOOD PRESSURE: 68 MMHG | HEIGHT: 74 IN | OXYGEN SATURATION: 98 % | SYSTOLIC BLOOD PRESSURE: 130 MMHG

## 2025-03-26 DIAGNOSIS — R60.0 LOCALIZED EDEMA: ICD-10-CM

## 2025-03-26 DIAGNOSIS — I10 ESSENTIAL (PRIMARY) HYPERTENSION: ICD-10-CM

## 2025-03-26 DIAGNOSIS — I25.10 ATHEROSCLEROTIC HEART DISEASE OF NATIVE CORONARY ARTERY W/OUT ANGINA PECTORIS: ICD-10-CM

## 2025-03-26 PROCEDURE — 99214 OFFICE O/P EST MOD 30 MIN: CPT

## 2025-03-26 PROCEDURE — G2211 COMPLEX E/M VISIT ADD ON: CPT

## 2025-03-26 PROCEDURE — 93000 ELECTROCARDIOGRAM COMPLETE: CPT

## 2025-04-20 ENCOUNTER — EMERGENCY (EMERGENCY)
Facility: HOSPITAL | Age: 80
LOS: 1 days | End: 2025-04-20
Attending: EMERGENCY MEDICINE | Admitting: EMERGENCY MEDICINE
Payer: MEDICARE

## 2025-04-20 VITALS
SYSTOLIC BLOOD PRESSURE: 152 MMHG | HEART RATE: 69 BPM | OXYGEN SATURATION: 98 % | WEIGHT: 145.06 LBS | TEMPERATURE: 97 F | RESPIRATION RATE: 16 BRPM | HEIGHT: 72 IN | DIASTOLIC BLOOD PRESSURE: 72 MMHG

## 2025-04-20 PROCEDURE — 99285 EMERGENCY DEPT VISIT HI MDM: CPT

## 2025-04-20 NOTE — ED ADULT TRIAGE NOTE - CHIEF COMPLAINT QUOTE
Patient brought by ambulance from home fell complaining of right leg and right hip pain not on thinners

## 2025-04-20 NOTE — ED ADULT NURSE NOTE - OBJECTIVE STATEMENT
Received patient alert and orientated times 4 s/p fall. Reports tripping over walker..  Reports right leg and hip pain.  Leg is slightly rotated , not able to weight bare, positive pedal pulses. Denied  head strike, no LOC, take 81 mg ASA daily..   placed in a gown.. Call bell placed with patient. Family at the bedside

## 2025-04-20 NOTE — ED ADULT NURSE NOTE - NSFALLASSISTNEEDED_ED_ALL_ED
Anesthesia Pre Eval Note    Anesthesia ROS/Med Hx        Anesthetic Complication History:  Patient does not have a history of anesthetic complications      Pulmonary Review:  History of: no asthma -     Neuro/Psych Review:    Negative for psychiatric history    Cardiovascular Review:  Patient does not have a cardiovascular history       GI/HEPATIC/RENAL Review:  Patient does not have a GI/hepatic/renalhistory       End/Other Review:  Patient does not have an endo/other history    Additional Results:     ALLERGIES:  No Known Allergies       Lab Results       Component                Value               Date                       WBC                      6.5                 10/01/2021                 WBC                      5.7                 11/13/2019                 RBC                      4.38                10/01/2021                 RBC                      4.72                11/13/2019                 HGB                      13.9                10/01/2021                 HGB                      13.0                11/13/2019                 HCT                      42.5                10/01/2021                 HCT                      42.0                11/13/2019                 MCHC                     32.7                10/01/2021                 MCHC                     31.0 (L)            11/13/2019                 SODIUM                   138                 10/01/2021                 SODIUM                   144                 03/02/2019                 POTASSIUM                4.2                 10/01/2021                 POTASSIUM                3.7                 03/02/2019                 CHLORIDE                 106                 10/01/2021                 CO2                      27                  10/01/2021                 CO2                      24                  03/02/2019                 GLUCOSE                  74                  10/01/2021                 GLUCOSE                   90                  03/02/2019                 BUN                      14                  10/01/2021                 BUN                      3 (L)               03/02/2019                 CREATININE               0.75                10/01/2021                 CREATININE               0.56                03/02/2019                 GFRESTIMATE              >90                 10/01/2021                 GFRA                     >90                 03/02/2019                 GFRNA                    >90                 03/02/2019                 CALCIUM                  9.1                 10/01/2021                 PLT                      306                 10/01/2021                 PLT                      310                 11/13/2019                 PTT                      33 (H)              03/04/2016                 INR                      1.0                 03/01/2019             Past Medical History:  No date: Anemia  No date: Crohn's disease (CMS/HCC)    Past Surgical History:  No date: Small intestine surgery       Prior to Admission medications :  Medication VEDOLizumab (Entyvio) 300 MG injection, Sig Indications: Crohn's Disease Inject into the vein every 4weeks, Start Date 6/28/21, End Date , Taking? Yes, Authorizing Provider Curt Gee MD    Medication Cyanocobalamin 1000 MCG/ML Kit, Sig Inject 1,000 mg as directed every 28 days., Start Date 9/28/20, End Date , Taking? Yes, Authorizing Provider Curt Gee MD    Medication Multiple Vitamins-Minerals (MULTIVITAMIN WITH MINERALS) tablet, Sig Take 1 tablet by mouth daily., Start Date , End Date , Taking? Yes, Authorizing Provider Outside Provider    Medication drospirenone-ethinyl estradiol (Ocella) 3-0.03 MG per tablet, Sig Take 1 tablet by mouth daily., Start Date 8/2/21, End Date 10/11/21, Taking? , Authorizing Provider Venkatesh Swain MD            Relevant Problems   No relevant active problems       Physical Exam     Airway    Mallampati: II  TM Distance: >3 FB  Neck ROM: Full  Neck: Non-tender and Able to place in sniff position  TMJ Mobility: Good    Cardiovascular  Cardiovascular exam normal  Cardio Rhythm: Regular  Cardio Rate: Normal    Head Assessment  Head assessment: Normocephalic and Atraumatic    General Assessment  General Assessment: Alert and oriented and No acute distress    Dental Exam  Dental exam normal    Pulmonary Exam  Pulmonary exam normal  Breath sounds clear to auscultation:  Yes    Abdominal Exam  Abdominal exam normal      Anesthesia Plan:  Anesthesia Plan    ASA Status: 2    Anesthesia Type: General    Induction: Intravenous  Maintenance: TIVA  Premedication: None    Consent/Risks Discussed Statement:  The proposed anesthetic plan, including its risks and benefits, have been discussed with the Patient along with the risks and benefits of alternatives. Questions were encouraged and answered and the patient and/or representative understands and agrees to proceed.        I discussed with the patient (and/or patient's legal representative) the risks and benefits of the proposed anesthesia plan, General, which may include services performed by other anesthesia providers.    Alternative anesthesia plans, if available, were reviewed with the patient (and/or patient's legal representative). Discussion has been held with the patient (and/or patient's legal representative) regarding risks of anesthesia, which include Sore Throat and emergent situations that may require change in anesthesia plan.    The patient (and/or patient's legal representative) has indicated understanding, his/her questions have been answered, and he/she wishes to proceed with the planned anesthetic.    Blood Products: Not Anticipated     Standing/Walking/Toileting/Moving from bed to stretcher

## 2025-04-20 NOTE — ED ADULT NURSE NOTE - NSFALLRISKINTERV_ED_ALL_ED

## 2025-04-21 ENCOUNTER — INPATIENT (INPATIENT)
Facility: HOSPITAL | Age: 80
LOS: 1 days | Discharge: ANOTHER HOSPITAL | DRG: 536 | End: 2025-04-23
Attending: STUDENT IN AN ORGANIZED HEALTH CARE EDUCATION/TRAINING PROGRAM | Admitting: STUDENT IN AN ORGANIZED HEALTH CARE EDUCATION/TRAINING PROGRAM
Payer: MEDICARE

## 2025-04-21 ENCOUNTER — TRANSCRIPTION ENCOUNTER (OUTPATIENT)
Age: 80
End: 2025-04-21

## 2025-04-21 VITALS
DIASTOLIC BLOOD PRESSURE: 72 MMHG | SYSTOLIC BLOOD PRESSURE: 136 MMHG | OXYGEN SATURATION: 100 % | RESPIRATION RATE: 18 BRPM | HEART RATE: 97 BPM | TEMPERATURE: 98 F

## 2025-04-21 VITALS
OXYGEN SATURATION: 99 % | HEART RATE: 100 BPM | RESPIRATION RATE: 20 BRPM | DIASTOLIC BLOOD PRESSURE: 74 MMHG | TEMPERATURE: 98 F | SYSTOLIC BLOOD PRESSURE: 153 MMHG | WEIGHT: 145.51 LBS

## 2025-04-21 DIAGNOSIS — D64.9 ANEMIA, UNSPECIFIED: ICD-10-CM

## 2025-04-21 DIAGNOSIS — Z29.9 ENCOUNTER FOR PROPHYLACTIC MEASURES, UNSPECIFIED: ICD-10-CM

## 2025-04-21 DIAGNOSIS — S72.001A FRACTURE OF UNSPECIFIED PART OF NECK OF RIGHT FEMUR, INITIAL ENCOUNTER FOR CLOSED FRACTURE: ICD-10-CM
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
## 2025-04-21 LAB
ALBUMIN SERPL ELPH-MCNC: 2.3 G/DL — LOW (ref 3.3–5)
ALP SERPL-CCNC: 143 U/L — HIGH (ref 40–120)
ALT FLD-CCNC: 21 U/L — SIGNIFICANT CHANGE UP (ref 12–78)
ANION GAP SERPL CALC-SCNC: 6 MMOL/L — SIGNIFICANT CHANGE UP (ref 5–17)
APTT BLD: 30.8 SEC — SIGNIFICANT CHANGE UP (ref 24.5–35.6)
AST SERPL-CCNC: 24 U/L — SIGNIFICANT CHANGE UP (ref 15–37)
BASOPHILS # BLD AUTO: 0.05 K/UL — SIGNIFICANT CHANGE UP (ref 0–0.2)
BASOPHILS NFR BLD AUTO: 0.6 % — SIGNIFICANT CHANGE UP (ref 0–2)
BILIRUB SERPL-MCNC: 0.4 MG/DL — SIGNIFICANT CHANGE UP (ref 0.2–1.2)
BLD GP AB SCN SERPL QL: SIGNIFICANT CHANGE UP
BUN SERPL-MCNC: 28 MG/DL — HIGH (ref 7–23)
CALCIUM SERPL-MCNC: 8.7 MG/DL — SIGNIFICANT CHANGE UP (ref 8.5–10.1)
CALCIUM SERPL-MCNC: 8.8 MG/DL — SIGNIFICANT CHANGE UP (ref 8.4–10.5)
CHLORIDE SERPL-SCNC: 106 MMOL/L — SIGNIFICANT CHANGE UP (ref 96–108)
CO2 SERPL-SCNC: 26 MMOL/L — SIGNIFICANT CHANGE UP (ref 22–31)
CREAT SERPL-MCNC: 0.76 MG/DL — SIGNIFICANT CHANGE UP (ref 0.5–1.3)
EGFR: 90 ML/MIN/1.73M2 — SIGNIFICANT CHANGE UP
EGFR: 90 ML/MIN/1.73M2 — SIGNIFICANT CHANGE UP
EOSINOPHIL # BLD AUTO: 0.07 K/UL — SIGNIFICANT CHANGE UP (ref 0–0.5)
EOSINOPHIL NFR BLD AUTO: 0.8 % — SIGNIFICANT CHANGE UP (ref 0–6)
GLUCOSE SERPL-MCNC: 119 MG/DL — HIGH (ref 70–99)
HCT VFR BLD CALC: 25.4 % — LOW (ref 39–50)
HCT VFR BLD CALC: 27.8 % — LOW (ref 39–50)
HGB BLD-MCNC: 8.6 G/DL — LOW (ref 13–17)
HGB BLD-MCNC: 8.9 G/DL — LOW (ref 13–17)
IMM GRANULOCYTES NFR BLD AUTO: 0.4 % — SIGNIFICANT CHANGE UP (ref 0–0.9)
INR BLD: 1.02 RATIO — SIGNIFICANT CHANGE UP (ref 0.85–1.16)
LYMPHOCYTES # BLD AUTO: 1.05 K/UL — SIGNIFICANT CHANGE UP (ref 1–3.3)
LYMPHOCYTES # BLD AUTO: 11.8 % — LOW (ref 13–44)
MAGNESIUM SERPL-MCNC: 1.9 MG/DL — SIGNIFICANT CHANGE UP (ref 1.6–2.6)
MCHC RBC-ENTMCNC: 27.8 PG — SIGNIFICANT CHANGE UP (ref 27–34)
MCHC RBC-ENTMCNC: 28.6 PG — SIGNIFICANT CHANGE UP (ref 27–34)
MCHC RBC-ENTMCNC: 32 G/DL — SIGNIFICANT CHANGE UP (ref 32–36)
MCHC RBC-ENTMCNC: 33.9 G/DL — SIGNIFICANT CHANGE UP (ref 32–36)
MCV RBC AUTO: 84.4 FL — SIGNIFICANT CHANGE UP (ref 80–100)
MCV RBC AUTO: 86.9 FL — SIGNIFICANT CHANGE UP (ref 80–100)
MONOCYTES # BLD AUTO: 0.87 K/UL — SIGNIFICANT CHANGE UP (ref 0–0.9)
MONOCYTES NFR BLD AUTO: 9.7 % — SIGNIFICANT CHANGE UP (ref 2–14)
NEUTROPHILS # BLD AUTO: 6.85 K/UL — SIGNIFICANT CHANGE UP (ref 1.8–7.4)
NEUTROPHILS NFR BLD AUTO: 76.7 % — SIGNIFICANT CHANGE UP (ref 43–77)
NRBC BLD AUTO-RTO: 0 /100 WBCS — SIGNIFICANT CHANGE UP (ref 0–0)
NRBC BLD AUTO-RTO: 0 /100 WBCS — SIGNIFICANT CHANGE UP (ref 0–0)
PHOSPHATE SERPL-MCNC: 4.6 MG/DL — HIGH (ref 2.5–4.5)
PLATELET # BLD AUTO: 309 K/UL — SIGNIFICANT CHANGE UP (ref 150–400)
PLATELET # BLD AUTO: 312 K/UL — SIGNIFICANT CHANGE UP (ref 150–400)
POTASSIUM SERPL-MCNC: 4.2 MMOL/L — SIGNIFICANT CHANGE UP (ref 3.5–5.3)
POTASSIUM SERPL-SCNC: 4.2 MMOL/L — SIGNIFICANT CHANGE UP (ref 3.5–5.3)
PROT SERPL-MCNC: 5.9 G/DL — LOW (ref 6–8.3)
PROTHROM AB SERPL-ACNC: 11.9 SEC — SIGNIFICANT CHANGE UP (ref 9.9–13.4)
RBC # BLD: 3.01 M/UL — LOW (ref 4.2–5.8)
RBC # BLD: 3.2 M/UL — LOW (ref 4.2–5.8)
RBC # FLD: 13 % — SIGNIFICANT CHANGE UP (ref 10.3–14.5)
RBC # FLD: 14.3 % — SIGNIFICANT CHANGE UP (ref 10.3–14.5)
SODIUM SERPL-SCNC: 138 MMOL/L — SIGNIFICANT CHANGE UP (ref 135–145)
WBC # BLD: 7.31 K/UL — SIGNIFICANT CHANGE UP (ref 3.8–10.5)
WBC # BLD: 8.93 K/UL — SIGNIFICANT CHANGE UP (ref 3.8–10.5)
WBC # FLD AUTO: 7.31 K/UL — SIGNIFICANT CHANGE UP (ref 3.8–10.5)
WBC # FLD AUTO: 8.93 K/UL — SIGNIFICANT CHANGE UP (ref 3.8–10.5)

## 2025-04-21 PROCEDURE — 73502 X-RAY EXAM HIP UNI 2-3 VIEWS: CPT | Mod: 26,RT

## 2025-04-21 PROCEDURE — 72170 X-RAY EXAM OF PELVIS: CPT | Mod: 26

## 2025-04-21 PROCEDURE — 72192 CT PELVIS W/O DYE: CPT | Mod: 26

## 2025-04-21 PROCEDURE — 36430 TRANSFUSION BLD/BLD COMPNT: CPT

## 2025-04-21 PROCEDURE — 96376 TX/PRO/DX INJ SAME DRUG ADON: CPT

## 2025-04-21 PROCEDURE — 76376 3D RENDER W/INTRP POSTPROCES: CPT | Mod: 26

## 2025-04-21 PROCEDURE — 96375 TX/PRO/DX INJ NEW DRUG ADDON: CPT

## 2025-04-21 PROCEDURE — 36415 COLL VENOUS BLD VENIPUNCTURE: CPT

## 2025-04-21 PROCEDURE — 72192 CT PELVIS W/O DYE: CPT | Mod: MC

## 2025-04-21 PROCEDURE — 93005 ELECTROCARDIOGRAM TRACING: CPT

## 2025-04-21 PROCEDURE — 86923 COMPATIBILITY TEST ELECTRIC: CPT

## 2025-04-21 PROCEDURE — P9016: CPT

## 2025-04-21 PROCEDURE — 73630 X-RAY EXAM OF FOOT: CPT | Mod: 26,RT

## 2025-04-21 PROCEDURE — 85610 PROTHROMBIN TIME: CPT

## 2025-04-21 PROCEDURE — 80053 COMPREHEN METABOLIC PANEL: CPT

## 2025-04-21 PROCEDURE — 72170 X-RAY EXAM OF PELVIS: CPT

## 2025-04-21 PROCEDURE — 96374 THER/PROPH/DIAG INJ IV PUSH: CPT

## 2025-04-21 PROCEDURE — 73630 X-RAY EXAM OF FOOT: CPT

## 2025-04-21 PROCEDURE — 71045 X-RAY EXAM CHEST 1 VIEW: CPT | Mod: 26

## 2025-04-21 PROCEDURE — 99221 1ST HOSP IP/OBS SF/LOW 40: CPT | Mod: FS,57

## 2025-04-21 PROCEDURE — 93010 ELECTROCARDIOGRAM REPORT: CPT

## 2025-04-21 PROCEDURE — 85730 THROMBOPLASTIN TIME PARTIAL: CPT

## 2025-04-21 PROCEDURE — 27506 TREATMENT OF THIGH FRACTURE: CPT | Mod: AS,RT

## 2025-04-21 PROCEDURE — 73502 X-RAY EXAM HIP UNI 2-3 VIEWS: CPT

## 2025-04-21 PROCEDURE — 86850 RBC ANTIBODY SCREEN: CPT

## 2025-04-21 PROCEDURE — 86901 BLOOD TYPING SEROLOGIC RH(D): CPT

## 2025-04-21 PROCEDURE — 73552 X-RAY EXAM OF FEMUR 2/>: CPT | Mod: 26,RT

## 2025-04-21 PROCEDURE — 86900 BLOOD TYPING SEROLOGIC ABO: CPT

## 2025-04-21 PROCEDURE — 99285 EMERGENCY DEPT VISIT HI MDM: CPT | Mod: 25

## 2025-04-21 PROCEDURE — 71045 X-RAY EXAM CHEST 1 VIEW: CPT

## 2025-04-21 PROCEDURE — 85025 COMPLETE CBC W/AUTO DIFF WBC: CPT

## 2025-04-21 PROCEDURE — 27245 TREAT THIGH FRACTURE: CPT | Mod: RT

## 2025-04-21 PROCEDURE — 76376 3D RENDER W/INTRP POSTPROCES: CPT

## 2025-04-21 PROCEDURE — 73552 X-RAY EXAM OF FEMUR 2/>: CPT

## 2025-04-21 DEVICE — SCREW TFNA FEN 100MM STRL: Type: IMPLANTABLE DEVICE | Site: RIGHT | Status: FUNCTIONAL

## 2025-04-21 DEVICE — IMPLANTABLE DEVICE: Type: IMPLANTABLE DEVICE | Site: RIGHT | Status: FUNCTIONAL

## 2025-04-21 DEVICE — SCREW LOKG STRL 5X50MM: Type: IMPLANTABLE DEVICE | Site: RIGHT | Status: FUNCTIONAL

## 2025-04-21 RX ORDER — TRANEXAMIC ACID 1000 MG/10
1000 AMPUL (ML) INTRAVENOUS ONCE
Refills: 0 | Status: DISCONTINUED | OUTPATIENT
Start: 2025-04-21 | End: 2025-04-23

## 2025-04-21 RX ORDER — CEFAZOLIN SODIUM IN 0.9 % NACL 3 G/100 ML
2000 INTRAVENOUS SOLUTION, PIGGYBACK (ML) INTRAVENOUS ONCE
Refills: 0 | Status: DISCONTINUED | OUTPATIENT
Start: 2025-04-21 | End: 2025-04-23

## 2025-04-21 RX ORDER — ONDANSETRON HCL/PF 4 MG/2 ML
4 VIAL (ML) INJECTION ONCE
Refills: 0 | Status: DISCONTINUED | OUTPATIENT
Start: 2025-04-21 | End: 2025-04-21

## 2025-04-21 RX ORDER — OXYCODONE HYDROCHLORIDE 30 MG/1
10 TABLET ORAL
Refills: 0 | Status: DISCONTINUED | OUTPATIENT
Start: 2025-04-21 | End: 2025-04-23

## 2025-04-21 RX ORDER — HYDROMORPHONE/SOD CHLOR,ISO/PF 2 MG/10 ML
0.5 SYRINGE (ML) INJECTION ONCE
Refills: 0 | Status: DISCONTINUED | OUTPATIENT
Start: 2025-04-21 | End: 2025-04-21

## 2025-04-21 RX ORDER — HYDROMORPHONE/SOD CHLOR,ISO/PF 2 MG/10 ML
0.2 SYRINGE (ML) INJECTION
Refills: 0 | Status: DISCONTINUED | OUTPATIENT
Start: 2025-04-21 | End: 2025-04-21

## 2025-04-21 RX ORDER — CYANOCOBALAMIN 1000 UG/ML
1000 INJECTION INTRAMUSCULAR; SUBCUTANEOUS DAILY
Refills: 0 | Status: DISCONTINUED | OUTPATIENT
Start: 2025-04-21 | End: 2025-04-23

## 2025-04-21 RX ORDER — POLYETHYLENE GLYCOL 3350 17 G/17G
17 POWDER, FOR SOLUTION ORAL AT BEDTIME
Refills: 0 | Status: DISCONTINUED | OUTPATIENT
Start: 2025-04-21 | End: 2025-04-23

## 2025-04-21 RX ORDER — SODIUM CHLORIDE 9 G/1000ML
1000 INJECTION, SOLUTION INTRAVENOUS
Refills: 0 | Status: DISCONTINUED | OUTPATIENT
Start: 2025-04-21 | End: 2025-04-21

## 2025-04-21 RX ORDER — MAGNESIUM HYDROXIDE 400 MG/5ML
30 SUSPENSION ORAL DAILY
Refills: 0 | Status: DISCONTINUED | OUTPATIENT
Start: 2025-04-21 | End: 2025-04-23

## 2025-04-21 RX ORDER — CEFAZOLIN SODIUM IN 0.9 % NACL 3 G/100 ML
2000 INTRAVENOUS SOLUTION, PIGGYBACK (ML) INTRAVENOUS EVERY 8 HOURS
Refills: 0 | Status: COMPLETED | OUTPATIENT
Start: 2025-04-22 | End: 2025-04-22

## 2025-04-21 RX ORDER — OXYCODONE HYDROCHLORIDE 30 MG/1
5 TABLET ORAL
Refills: 0 | Status: DISCONTINUED | OUTPATIENT
Start: 2025-04-21 | End: 2025-04-23

## 2025-04-21 RX ORDER — ASPIRIN 325 MG
81 TABLET ORAL DAILY
Refills: 0 | Status: DISCONTINUED | OUTPATIENT
Start: 2025-04-21 | End: 2025-04-21

## 2025-04-21 RX ORDER — BISACODYL 5 MG
10 TABLET, DELAYED RELEASE (ENTERIC COATED) ORAL ONCE
Refills: 0 | Status: DISCONTINUED | OUTPATIENT
Start: 2025-04-23 | End: 2025-04-23

## 2025-04-21 RX ORDER — ONDANSETRON HCL/PF 4 MG/2 ML
4 VIAL (ML) INJECTION ONCE
Refills: 0 | Status: COMPLETED | OUTPATIENT
Start: 2025-04-21 | End: 2025-04-21

## 2025-04-21 RX ORDER — SODIUM CHLORIDE 9 G/1000ML
1000 INJECTION, SOLUTION INTRAVENOUS
Refills: 0 | Status: DISCONTINUED | OUTPATIENT
Start: 2025-04-21 | End: 2025-04-22

## 2025-04-21 RX ORDER — ENOXAPARIN SODIUM 100 MG/ML
40 INJECTION SUBCUTANEOUS EVERY 24 HOURS
Refills: 0 | Status: DISCONTINUED | OUTPATIENT
Start: 2025-04-22 | End: 2025-04-23

## 2025-04-21 RX ORDER — HYDROMORPHONE/SOD CHLOR,ISO/PF 2 MG/10 ML
0.5 SYRINGE (ML) INJECTION
Refills: 0 | Status: DISCONTINUED | OUTPATIENT
Start: 2025-04-21 | End: 2025-04-22

## 2025-04-21 RX ORDER — ONDANSETRON HCL/PF 4 MG/2 ML
4 VIAL (ML) INJECTION EVERY 6 HOURS
Refills: 0 | Status: DISCONTINUED | OUTPATIENT
Start: 2025-04-21 | End: 2025-04-23

## 2025-04-21 RX ORDER — ACETAMINOPHEN 500 MG/5ML
1000 LIQUID (ML) ORAL ONCE
Refills: 0 | Status: DISCONTINUED | OUTPATIENT
Start: 2025-04-21 | End: 2025-04-23

## 2025-04-21 RX ORDER — APREPITANT 40 MG/1
40 CAPSULE ORAL ONCE
Refills: 0 | Status: DISCONTINUED | OUTPATIENT
Start: 2025-04-21 | End: 2025-04-23

## 2025-04-21 RX ORDER — HYDROMORPHONE/SOD CHLOR,ISO/PF 2 MG/10 ML
0.5 SYRINGE (ML) INJECTION
Refills: 0 | Status: DISCONTINUED | OUTPATIENT
Start: 2025-04-21 | End: 2025-04-21

## 2025-04-21 RX ORDER — ACETAMINOPHEN 500 MG/5ML
1000 LIQUID (ML) ORAL EVERY 8 HOURS
Refills: 0 | Status: DISCONTINUED | OUTPATIENT
Start: 2025-04-22 | End: 2025-04-23

## 2025-04-21 RX ORDER — ATORVASTATIN CALCIUM 80 MG/1
20 TABLET, FILM COATED ORAL AT BEDTIME
Refills: 0 | Status: DISCONTINUED | OUTPATIENT
Start: 2025-04-21 | End: 2025-04-23

## 2025-04-21 RX ORDER — ACETAMINOPHEN 500 MG/5ML
1000 LIQUID (ML) ORAL ONCE
Refills: 0 | Status: COMPLETED | OUTPATIENT
Start: 2025-04-22 | End: 2025-04-22

## 2025-04-21 RX ORDER — SENNA 187 MG
2 TABLET ORAL AT BEDTIME
Refills: 0 | Status: DISCONTINUED | OUTPATIENT
Start: 2025-04-21 | End: 2025-04-23

## 2025-04-21 RX ORDER — CELECOXIB 50 MG/1
200 CAPSULE ORAL EVERY 12 HOURS
Refills: 0 | Status: DISCONTINUED | OUTPATIENT
Start: 2025-04-22 | End: 2025-04-22

## 2025-04-21 RX ORDER — DEXAMETHASONE 0.5 MG/1
8 TABLET ORAL ONCE
Refills: 0 | Status: COMPLETED | OUTPATIENT
Start: 2025-04-22 | End: 2025-04-22

## 2025-04-21 RX ADMIN — Medication 4 MILLIGRAM(S): at 00:33

## 2025-04-21 RX ADMIN — ATORVASTATIN CALCIUM 20 MILLIGRAM(S): 80 TABLET, FILM COATED ORAL at 22:09

## 2025-04-21 RX ADMIN — Medication 4 MILLIGRAM(S): at 01:30

## 2025-04-21 RX ADMIN — SODIUM CHLORIDE 100 MILLILITER(S): 9 INJECTION, SOLUTION INTRAVENOUS at 22:09

## 2025-04-21 RX ADMIN — Medication 0.5 MILLIGRAM(S): at 11:20

## 2025-04-21 RX ADMIN — Medication 500 MILLILITER(S): at 22:09

## 2025-04-21 RX ADMIN — Medication 4 MILLIGRAM(S): at 05:08

## 2025-04-21 NOTE — ED PROVIDER NOTE - MUSCULOSKELETAL MINIMAL EXAM
Right lower extremity: There is no tenderness to the right hip.  There is tenderness to the distal right femur.  No tenderness of the knee or tib-fib region.  There is also some mild tenderness over the dorsal aspect of the right foot.  No obvious swelling or deformity noted.  Normal DP pulse noted on the right.

## 2025-04-21 NOTE — ED PROVIDER NOTE - OBJECTIVE STATEMENT
79-year-old male with history of hyperlipidemia, GERD has presenting for right leg pain status post mechanical fall about 7:30 PM this evening.  Denies head injury or LOC.  Patient states he was using his walker walking into the kitchen when he tripped over the wheel and fell onto his right leg.  Denies chest pain.  States he was not able to bear weight on the right leg.  Pain is 10 out of 10.  Patient reports he only takes a baby aspirin does not take any other anticoagulants.

## 2025-04-21 NOTE — PATIENT PROFILE ADULT - FUNCTIONAL ASSESSMENT - BASIC MOBILITY 6.
1-calculated by average/Not able to assess (calculate score using [facility] averaging method)

## 2025-04-21 NOTE — ED PROVIDER NOTE - CLINICAL SUMMARY MEDICAL DECISION MAKING FREE TEXT BOX
79-year-old male presenting with right leg pain status post mechanical fall.  Will get x-rays to evaluate for fracture.  Pain management.  Will reassess.

## 2025-04-21 NOTE — CONSULT NOTE ADULT - SUBJECTIVE AND OBJECTIVE BOX
Patient is a 79yMale walker ambulator who presents to [facility] ED w/ a c/o of right hip pain. Patient states that he sustained a mechanical fall last night. Denies HS/LOC. States inability to walk immediately following the injury. Denies any numbness or tingling. Denies having any other pain elsewhere. Denies any previous orthopedic history. No other orthopedic concerns at this time.    Bipolar disorder    H/O gastric ulcer    MI (myocardial infarction)            No Known Allergies      PHYSICAL EXAM:  T(C): 36.5 (04-21-25 @ 04:34), Max: 36.5 (04-21-25 @ 04:34)  HR: 90 (04-21-25 @ 04:34) (69 - 90)  BP: 149/78 (04-21-25 @ 04:34) (149/78 - 152/72)  RR: 18 (04-21-25 @ 04:34) (16 - 18)  SpO2: 99% (04-21-25 @ 04:34) (98% - 99%)    Gen: NAD, Resting comfortably  RLE:  Skin intact, with significant swelling about the thigh  TTP around the hip  +EHL/FHL/TA/GSC  +SILT L3-S1  + DP  Compartments soft and compressible  No calf tenderness    Secondary Survey:   LLE/RUE/LUE: No TTP over bony prominences, SILT, palpable pulses, full/painless range of motion, compartments soft    Spine: No bony tenderness. No palpable stepoffs.      A/P: 79M who presents with a R IT Fx    Plan for transfer to [place] for R Hip IMN with Dr. Munguia  Please keep patient NPO for OR  Hold chemical DVT ppx for OR  Preop labs  Please obtain EKG prior to transfer  Analgesia  NWB RLE  Ice and elevate as tolerated  Discussed with Dr. Munguia who is in agreement with above plan  
History of Present Illness: The patient is an 79 year old male with a history of HTN, HL, CAD s/p PCI, anemia who presents with a fall. He states he tripped over his walker. No chest pain or shortness of breath. No exertional symptoms.    Past Medical/Surgical History:  HTN, HL, CAD s/p PCI, anemia    Medications:  Home Medications:  ascorbic acid 500 mg oral tablet: 1 tab(s) orally once a day (24 Dec 2024 14:34)  aspirin 81 mg oral delayed release tablet: 2 tab(s) orally once a day (24 Dec 2024 14:34)  atorvastatin 20 mg oral tablet: 1 tab(s) orally once a day (24 Dec 2024 14:34)  cholecalciferol 25 mcg (1000 intl units) oral tablet: 1 tab(s) orally once a day (24 Dec 2024 14:34)  cyanocobalamin 50 mcg oral tablet: 1 tab(s) orally once a day (24 Dec 2024 14:34)  pancrelipase 36,000 units-114,000 units-180,000 units oral delayed release capsule: 2 cap(s) orally 3 times a day (before meals) (24 Dec 2024 14:34)      Family History: Non-contributory family history of premature cardiovascular atherosclerotic disease    Social History: No tobacco, alcohol or drug use    Review of Systems:  General: No fevers, chills, weight gain  Skin: No rashes, color changes  Cardiovascular: No chest pain, orthopnea  Respiratory: No shortness of breath, cough  Gastrointestinal: No nausea, abdominal pain  Genitourinary: No incontinence, pain with urination  Musculoskeletal: No pain, swelling, decreased range of motion  Neurological: No headache, weakness  Psychiatric: No depression, anxiety  Endocrine: No weight gain, increased thirst  All other systems are comprehensively negative.    Physical Exam:  Vitals:        Vital Signs Last 24 Hrs  T(C): 36.8 (21 Apr 2025 07:30), Max: 36.8 (21 Apr 2025 07:30)  T(F): 98.2 (21 Apr 2025 07:30), Max: 98.2 (21 Apr 2025 07:30)  HR: 103 (21 Apr 2025 07:30) (69 - 103)  BP: 148/73 (21 Apr 2025 07:30) (148/73 - 152/72)  BP(mean): --  RR: 16 (21 Apr 2025 07:30) (16 - 18)  SpO2: 100% (21 Apr 2025 07:30) (98% - 100%)    Parameters below as of 21 Apr 2025 07:30  Patient On (Oxygen Delivery Method): room air      General: NAD  HEENT: MMM  Neck: No JVD, no carotid bruit  Lungs: CTAB  CV: RRR, nl S1/S2, no M/R/G  Abdomen: S/NT/ND, +BS  Extremities: No LE edema, no cyanosis  Neuro: AAOx3, non-focal  Skin: No rash    Labs:                        8.6    8.93  )-----------( 312      ( 21 Apr 2025 00:32 )             25.4     04-21    138  |  106  |  28[H]  ----------------------------<  119[H]  4.2   |  26  |  0.76    Ca    8.7      21 Apr 2025 00:32    TPro  5.9[L]  /  Alb  2.3[L]  /  TBili  0.4  /  DBili  x   /  AST  24  /  ALT  21  /  AlkPhos  143[H]  04-21        PT/INR - ( 21 Apr 2025 00:32 )   PT: 11.9 sec;   INR: 1.02 ratio         PTT - ( 21 Apr 2025 00:32 )  PTT:30.8 sec    ECG/Telemetry: NSR, normal axis, nonspecific ST abnormality

## 2025-04-21 NOTE — ED PROVIDER NOTE - WET READ LAUNCH FT
There are no Wet Read(s) to document. There are 3 Wet Read(s) to document. There are 5 Wet Read(s) to document.

## 2025-04-21 NOTE — H&P ADULT - HISTORY OF PRESENT ILLNESS
79-year-old male with history of hyperlipidemia, GERD has presenting for right leg pain status post mechanical fall about 7:30 PM this evening.  Denies head injury or LOC.  Patient states he was using his walker walking into the kitchen when he tripped over the wheel and fell onto his right leg.  Denies chest pain.  States he was not able to bear weight on the right leg.  Pain is 10 out of 10.  Patient reports he only takes a baby aspirin does not take any other anticoagulants.  In ER patient was found to have hip fx.  patient is being admitted for further work up and treatment

## 2025-04-21 NOTE — H&P ADULT - NSHPPHYSICALEXAM_GEN_ALL_CORE
· CONSTITUTIONAL: - - -  · Appearance: well appearing  · Distress: no apparent  · Mentation: awake, alert, oriented to person, place, time/situation  · HENMT: - - -  · Head Shape: ATRAUMATIC  · CARDIAC: - - -  · CARDIAC RHYTHM: regular  · CARDIAC RATE: normal  · CARDIAC SOUNDS: S1-S2  · RESPIRATORY: Breath sounds clear and equal bilaterally.  · MUSCULOSKELETAL: - - -  · MUSC EXAM: Right lower extremity: There is no tenderness to the right hip.  There is tenderness to the distal right femur.  No tenderness of the knee or tib-fib region.  There is also some mild tenderness over the dorsal aspect of the right foot.  No obvious swelling or deformity noted.  Normal DP pulse noted on the right.

## 2025-04-21 NOTE — H&P ADULT - NSHPLABSRESULTS_GEN_ALL_CORE
04-21    138  |  106  |  28[H]  ----------------------------<  119[H]  4.2   |  26  |  0.76    Ca    8.7      21 Apr 2025 00:32    TPro  5.9[L]  /  Alb  2.3[L]  /  TBili  0.4  /  DBili  x   /  AST  24  /  ALT  21  /  AlkPhos  143[H]  04-21                            8.6    8.93  )-----------( 312      ( 21 Apr 2025 00:32 )             25.4             LIVER FUNCTIONS - ( 21 Apr 2025 00:32 )  Alb: 2.3 g/dL / Pro: 5.9 g/dL / ALK PHOS: 143 U/L / ALT: 21 U/L / AST: 24 U/L / GGT: x             PT/INR - ( 21 Apr 2025 00:32 )   PT: 11.9 sec;   INR: 1.02 ratio         PTT - ( 21 Apr 2025 00:32 )  PTT:30.8 sec    Urinalysis Basic - ( 21 Apr 2025 00:32 )    Color: x / Appearance: x / SG: x / pH: x  Gluc: 119 mg/dL / Ketone: x  / Bili: x / Urobili: x   Blood: x / Protein: x / Nitrite: x   Leuk Esterase: x / RBC: x / WBC x   Sq Epi: x / Non Sq Epi: x / Bacteria: x

## 2025-04-21 NOTE — H&P ADULT - NSHPADDITIONALINFOADULT_GEN_ALL_CORE
patient is as medically optimal as possible for procedure  cardiac clearance in place  no prop TTE per cardio Dr. browne  see cardio eval from [facility] ER today

## 2025-04-21 NOTE — CONSULT NOTE ADULT - SUBJECTIVE AND OBJECTIVE BOX
HPI: 79y Male community ambulatory with walker presents c/o R hip pain and inability to ambulate sp mechanical fall. Denies HS/LOC. Denies numbness/tingling. Denies fever/chills. Denies pain/injury elsewhere.     Fracture of unspecified part of neck of right femur, initial encounter for closed fracture    Family history of heart attack (Father)    Handoff    Bipolar disorder    H/O gastric ulcer    MI (myocardial infarction)    Fall    Hip fracture, right    Preventive measure    Anemia    History of gastrectomy    FALL    SysAdmin_VstLnk      PAST MEDICAL & SURGICAL HISTORY:  Bipolar disorder      H/O gastric ulcer  back in 1978      MI (myocardial infarction)      History of gastrectomy  in 1978, for gastric ulcer        MEDICATIONS  (STANDING):  acetaminophen   IVPB .. 1000 milliGRAM(s) IV Intermittent once  aprepitant 40 milliGRAM(s) Oral once  ascorbic acid 500 milliGRAM(s) Oral daily  aspirin enteric coated 81 milliGRAM(s) Oral daily  atorvastatin 20 milliGRAM(s) Oral at bedtime  ceFAZolin   IVPB 2000 milliGRAM(s) IV Intermittent once  chlorhexidine 2% Cloths 1 Application(s) Topical once  cholecalciferol 1000 Unit(s) Oral daily  cyanocobalamin 1000 MICROGram(s) Oral daily  tranexamic acid IVPB 1000 milliGRAM(s) IV Intermittent once  tranexamic acid IVPB 1000 milliGRAM(s) IV Intermittent once    Allergies    No Known Allergies    Intolerances                            8.9    7.31  )-----------( 309      ( 21 Apr 2025 16:30 )             27.8     21 Apr 2025 00:32    138    |  106    |  28     ----------------------------<  119    4.2     |  26     |  0.76     Ca    8.8        21 Apr 2025 16:30  Phos  4.6       21 Apr 2025 16:30  Mg     1.9       21 Apr 2025 16:30    TPro  5.9    /  Alb  2.3    /  TBili  0.4    /  DBili  x      /  AST  24     /  ALT  21     /  AlkPhos  143    21 Apr 2025 00:32    PT/INR - ( 21 Apr 2025 00:32 )   PT: 11.9 sec;   INR: 1.02 ratio         PTT - ( 21 Apr 2025 00:32 )  PTT:30.8 sec  Vital Signs Last 24 Hrs  T(C): 36.9 (04-21-25 @ 16:01), Max: 36.9 (04-21-25 @ 16:01)  T(F): 98.4 (04-21-25 @ 16:01), Max: 98.4 (04-21-25 @ 16:01)  HR: 97 (04-21-25 @ 16:01) (69 - 112)  BP: 150/64 (04-21-25 @ 16:01) (131/78 - 152/72)  BP(mean): --  RR: 20 (04-21-25 @ 16:01) (16 - 20)  SpO2: 99% (04-21-25 @ 16:01) (98% - 100%)  Imaging: XR demonstrates R/L hip fracture    Physical Exam  General: NAD, Alert, Awake and oriented  RIGHT LE: No open skin. Externally Rotated and shortened. No deformities or other signs of trauma at  knee, lower leg, ankle or foot. Full baseline painless ROM at ankle and toes with. Positive log-roll and heel strike.     The patient is a 79-year-old male past medical history as above sustained above injury following mechanical fall. Admitted to the hospital for medical optimization. Physical exam reveals closed injury and intact neurovascular exam. Radiographs demonstrate right intertrochanteric hip fracture; severe osteoporosis. Based on injury pattern and desire to promote mobilization and decrease pain, surgical intervention was offered. The risks, benefits, alternatives of various treatment options were discussed with the patient and family specifically their HCP . We explicitly discussed nonoperative management however they would like to proceed with surgery. I explained to them the 30% risk of loss of functional level and one-year mortality.     Non-operative treatment: Benefit - No surgical/anesthetic risk   Risks - Persistent pain, malunion/nonunion, delayed mobilization/ prolonged immobility and attendant medical risks.     RIGHT Hip Cephalomedullary Nail Benefit - higher rate of fracture union, better chance for more anatomic alignment of fracture vs nonop; no risk of dislocation   Risks - malunion, nonunion, periprosthetic fracture, general risks of surgery (infection, wound healing problems, persistent pain, neurovascular injury, need for further surgery), avascular necrosis or arthritis requiring hip replacement, general perioperative medical risks (cardiac, pulmonary, renal, GI, as well as VTE)    The nature and purpose of the cephalomedullary nail alternative method(s) of treatment, the material risks involved, and the possibility of complications were fully explained to the patient. The patient was told the most common risks and complications associated with a intertrochanteric hip fracture include, but are not limited to blood clots in the leg, fatal pulmonary embolism, dislocation of the prosthesis, intraoperative and postoperative fractures of the femur or acetabulum, infection, failure of the prosthesis or grafting materials, complications from anesthesia, reactions to blood transfusions, postoperative leg length inequality, nerve damage or injury, vascular injury, delayed wound healing, infections, other injury or even death. Also, the patient was told that after undergoing procedure there may still be pain or disability. The patient was informed that the success of this operation in part depends upon the mechanical devices which are going to be implanted and that these devices can fail or malfunction, and may need to be repaired or replaced and there are no guarantees as to the longevity of this device or its part and that it or its parts could fail prematurely. Finally, the patient was asked to follow completely and fully with all advice and recommended treatments, and that recovery and ultimate outcome are affected by their compliance with recommended treatment.     ASSESSMENT & PLAN In brief, this is an 79M w/ R IT fracture preop for OR   -NWB RLE, bedrest   -OR today   -f/u preop: CBC, BMP, coags, T&S x2, CXR, EKG   -NPO since midnight, IVF   -hold chemical DVT ppx for OR; SCDs OK   -Medical clearance/optimization for OR   -pain control -ice/cold compress   -obtain Vit D level. Supplementation with daily Ca/Vit D   -Outpt osteoporosis workup

## 2025-04-21 NOTE — ED PROVIDER NOTE - PROGRESS NOTE DETAILS
spoke with ortho Case d/w Dr. Mar, accepts transfer to   [facility] called to set up transfer  Pt aware  Called pt's wife to inform her of transfer

## 2025-04-21 NOTE — CONSULT NOTE ADULT - ASSESSMENT
The patient is an 79 year old male with a history of HTN, HL, CAD s/p PCI, anemia who presents with a fall.     Plan:  - ECG with sinus rhythm and no evidence of ischemia/infarction  - Echo 2/23 with normal LV systolic function, no significant valve issues  - Cardiac catheterization 2012 with mRCA 99% s/p PCI  - Continue aspirin 81 mg daily  - Continue atorvastatin 20 mg daily  - Ortho follow-up  - The patient is at intermediate risk for cardiac events for an intermediate risk surgery. The patient is optimized to proceed for surgery from a cardiac standpoint.

## 2025-04-22 ENCOUNTER — TRANSCRIPTION ENCOUNTER (OUTPATIENT)
Age: 80
End: 2025-04-22

## 2025-04-22 DIAGNOSIS — R42 DIZZINESS AND GIDDINESS: ICD-10-CM

## 2025-04-22 LAB
24R-OH-CALCIDIOL SERPL-MCNC: 32.2 NG/ML — SIGNIFICANT CHANGE UP
A1C WITH ESTIMATED AVERAGE GLUCOSE RESULT: 6 % — HIGH (ref 4–5.6)
ANION GAP SERPL CALC-SCNC: 4 MMOL/L — LOW (ref 5–17)
BUN SERPL-MCNC: 30 MG/DL — HIGH (ref 7–23)
CALCIUM SERPL-MCNC: 8.3 MG/DL — LOW (ref 8.4–10.5)
CHLORIDE SERPL-SCNC: 105 MMOL/L — SIGNIFICANT CHANGE UP (ref 96–108)
CO2 SERPL-SCNC: 27 MMOL/L — SIGNIFICANT CHANGE UP (ref 22–31)
CREAT SERPL-MCNC: 0.94 MG/DL — SIGNIFICANT CHANGE UP (ref 0.5–1.3)
EGFR: 81 ML/MIN/1.73M2 — SIGNIFICANT CHANGE UP
EGFR: 81 ML/MIN/1.73M2 — SIGNIFICANT CHANGE UP
ESTIMATED AVERAGE GLUCOSE: 126 MG/DL — HIGH (ref 68–114)
GLUCOSE SERPL-MCNC: 160 MG/DL — HIGH (ref 70–99)
HCT VFR BLD CALC: 24.5 % — LOW (ref 39–50)
HCT VFR BLD CALC: 25.3 % — LOW (ref 39–50)
HGB BLD-MCNC: 8 G/DL — LOW (ref 13–17)
HGB BLD-MCNC: 8.2 G/DL — LOW (ref 13–17)
MCHC RBC-ENTMCNC: 28 PG — SIGNIFICANT CHANGE UP (ref 27–34)
MCHC RBC-ENTMCNC: 28.5 PG — SIGNIFICANT CHANGE UP (ref 27–34)
MCHC RBC-ENTMCNC: 32.4 G/DL — SIGNIFICANT CHANGE UP (ref 32–36)
MCHC RBC-ENTMCNC: 32.7 G/DL — SIGNIFICANT CHANGE UP (ref 32–36)
MCV RBC AUTO: 85.7 FL — SIGNIFICANT CHANGE UP (ref 80–100)
MCV RBC AUTO: 87.8 FL — SIGNIFICANT CHANGE UP (ref 80–100)
NRBC BLD AUTO-RTO: 0 /100 WBCS — SIGNIFICANT CHANGE UP (ref 0–0)
NRBC BLD AUTO-RTO: 0 /100 WBCS — SIGNIFICANT CHANGE UP (ref 0–0)
PLATELET # BLD AUTO: 246 K/UL — SIGNIFICANT CHANGE UP (ref 150–400)
PLATELET # BLD AUTO: 249 K/UL — SIGNIFICANT CHANGE UP (ref 150–400)
POTASSIUM SERPL-MCNC: 4.7 MMOL/L — SIGNIFICANT CHANGE UP (ref 3.5–5.3)
POTASSIUM SERPL-SCNC: 4.7 MMOL/L — SIGNIFICANT CHANGE UP (ref 3.5–5.3)
RBC # BLD: 2.86 M/UL — LOW (ref 4.2–5.8)
RBC # BLD: 2.88 M/UL — LOW (ref 4.2–5.8)
RBC # FLD: 14.5 % — SIGNIFICANT CHANGE UP (ref 10.3–14.5)
RBC # FLD: 14.9 % — HIGH (ref 10.3–14.5)
SODIUM SERPL-SCNC: 136 MMOL/L — SIGNIFICANT CHANGE UP (ref 135–145)
TSH SERPL-MCNC: 3.1 UIU/ML — SIGNIFICANT CHANGE UP (ref 0.27–4.2)
WBC # BLD: 10.73 K/UL — HIGH (ref 3.8–10.5)
WBC # BLD: 9.35 K/UL — SIGNIFICANT CHANGE UP (ref 3.8–10.5)
WBC # FLD AUTO: 10.73 K/UL — HIGH (ref 3.8–10.5)
WBC # FLD AUTO: 9.35 K/UL — SIGNIFICANT CHANGE UP (ref 3.8–10.5)

## 2025-04-22 RX ORDER — ACETAMINOPHEN 500 MG/5ML
2 LIQUID (ML) ORAL
Qty: 0 | Refills: 0 | DISCHARGE
Start: 2025-04-22

## 2025-04-22 RX ORDER — POLYETHYLENE GLYCOL 3350 17 G/17G
17 POWDER, FOR SOLUTION ORAL
Qty: 0 | Refills: 0 | DISCHARGE
Start: 2025-04-22

## 2025-04-22 RX ORDER — MECLIZINE HCL 12.5 MG
12.5 TABLET ORAL THREE TIMES A DAY
Refills: 0 | Status: DISCONTINUED | OUTPATIENT
Start: 2025-04-22 | End: 2025-04-23

## 2025-04-22 RX ORDER — SENNA 187 MG
2 TABLET ORAL
Qty: 0 | Refills: 0 | DISCHARGE
Start: 2025-04-22

## 2025-04-22 RX ADMIN — Medication 1000 MILLIGRAM(S): at 05:27

## 2025-04-22 RX ADMIN — Medication 100 MILLIGRAM(S): at 02:06

## 2025-04-22 RX ADMIN — Medication 400 MILLIGRAM(S): at 00:33

## 2025-04-22 RX ADMIN — Medication 1000 MILLIGRAM(S): at 14:19

## 2025-04-22 RX ADMIN — Medication 1000 UNIT(S): at 11:58

## 2025-04-22 RX ADMIN — Medication 1000 MILLIGRAM(S): at 00:40

## 2025-04-22 RX ADMIN — Medication 1000 MILLIGRAM(S): at 21:25

## 2025-04-22 RX ADMIN — OXYCODONE HYDROCHLORIDE 5 MILLIGRAM(S): 30 TABLET ORAL at 09:06

## 2025-04-22 RX ADMIN — CELECOXIB 200 MILLIGRAM(S): 50 CAPSULE ORAL at 09:23

## 2025-04-22 RX ADMIN — Medication 500 MILLIGRAM(S): at 11:58

## 2025-04-22 RX ADMIN — DEXAMETHASONE 101.6 MILLIGRAM(S): 0.5 TABLET ORAL at 05:27

## 2025-04-22 RX ADMIN — ENOXAPARIN SODIUM 40 MILLIGRAM(S): 100 INJECTION SUBCUTANEOUS at 08:54

## 2025-04-22 RX ADMIN — CELECOXIB 200 MILLIGRAM(S): 50 CAPSULE ORAL at 08:53

## 2025-04-22 RX ADMIN — OXYCODONE HYDROCHLORIDE 5 MILLIGRAM(S): 30 TABLET ORAL at 09:36

## 2025-04-22 RX ADMIN — Medication 100 MILLIGRAM(S): at 09:08

## 2025-04-22 RX ADMIN — Medication 1000 MILLIGRAM(S): at 14:49

## 2025-04-22 RX ADMIN — CYANOCOBALAMIN 1000 MICROGRAM(S): 1000 INJECTION INTRAMUSCULAR; SUBCUTANEOUS at 12:44

## 2025-04-22 RX ADMIN — Medication 1000 MILLIGRAM(S): at 06:46

## 2025-04-22 RX ADMIN — Medication 40 MILLIGRAM(S): at 05:28

## 2025-04-22 RX ADMIN — Medication 1000 MILLIGRAM(S): at 23:15

## 2025-04-22 RX ADMIN — ATORVASTATIN CALCIUM 20 MILLIGRAM(S): 80 TABLET, FILM COATED ORAL at 21:25

## 2025-04-22 NOTE — PHYSICAL THERAPY INITIAL EVALUATION ADULT - NSPTDISCHREC_GEN_A_CORE
"Patient has Abnormal Phosphorus: hypophosphatemia. Will continue to monitor electrolytes closely. Will replace the affected electrolytes and repeat labs to be done after interventions completed. The patient's phosphorus results have been reviewed and are listed below.  No results for input(s): "PHOS" in the last 24 hours.     -replace as needed  " Sub-acute Rehab

## 2025-04-22 NOTE — OCCUPATIONAL THERAPY INITIAL EVALUATION ADULT - LEVEL OF INDEPENDENCE: DRESS LOWER BODY, OT EVAL
[FreeTextEntry1] : 81 yo female, former smoker, w/ PMHx of HTN, afib (on Pradaxa currently) arthritis, bronchiectasis and COPD presents today to establish care. 
for socks/dependent (less than 25% patients effort)

## 2025-04-22 NOTE — OCCUPATIONAL THERAPY INITIAL EVALUATION ADULT - LEVEL OF INDEPENDENCE: TOILET, REHAB EVAL
pt is not safe to transfer onto the commode at this time 2* current pain level and slight dizziness. OT  recommending  pt use a urinal at this time

## 2025-04-22 NOTE — DIETITIAN INITIAL EVALUATION ADULT - ORAL INTAKE PTA/DIET HISTORY
Pt visited in room with family at bedside, pt reports good appetite and PO intake PTA, was eating well with no changes in appetite and was not following a therapeutic diet. Confirms NKFA and takes Vitamin C, Vitamin D and Vitamin B12.

## 2025-04-22 NOTE — DISCHARGE NOTE PROVIDER - CARE PROVIDER_API CALL
Mason Munguia  Joint Reconstruction  [address]  [place] [address]  Phone: [phone]  Fax: [phone]  Established Patient  Follow Up Time: 2 weeks

## 2025-04-22 NOTE — PHYSICAL THERAPY INITIAL EVALUATION ADULT - RANGE OF MOTION EXAMINATION, REHAB EVAL
right hip flex pt unable due to pain/bilateral upper extremity ROM was WFL (within functional limits)/bilateral lower extremity ROM was WFL (within functional limits)/deficits as listed below

## 2025-04-22 NOTE — DISCHARGE NOTE PROVIDER - NSDCCPCAREPLAN_GEN_ALL_CORE_FT
PRINCIPAL DISCHARGE DIAGNOSIS  Diagnosis: Hip fracture, right  Assessment and Plan of Treatment: Physicial Therapy, Occupational Therapy for ambulation, transfers, ADLs  Weight bearing as tolerated.  Ice hip 20 minutes every several times daily as needed.  You have a Mepilex dressing. You may shower. Mepilex dressing is water resistant, not waterproof. Do not aim shower stream at surgical site.  Pat dry after showering.  Remove Mepilex dressing in 1 week. You have steristrips over your incision which will fall off on their own.  Keep incision clean. DO NOT APPLY ANYTHING to incision site (salves/ointments/creams).  May shower.  Do not scrub incision site. Pat dry after shower.

## 2025-04-22 NOTE — DISCHARGE NOTE PROVIDER - HOSPITAL COURSE
This patient was admitted to [facility] on 4/21/2025 with a Right hip fracture.  Patient underwent Pre-Surgical Testing and was medically cleared to undergo procedure. Patient underwent Right hip Intramedullary Nailing on 4/21/2025 by Dr. Mason Munguia. Procedure was well tolerated.  No operative or suman-operative complications arose during patient's hospital course.  Patient received antibiotic according to SCIP guidelines for infection prevention. Lovenox was given for DVT prophylaxis, in addition to the use of SCDs.  Anesthesia, Medical Hospitalist, Physical Therapy and Occupational Therapy were consulted. Patient is stable for discharge with a good prognosis.  Appropriate discharge instructions and medications are provided in this document. This patient was admitted to [facility] on 4/21/2025 with a Right hip fracture.  Patient underwent Pre-Surgical Testing and was medically cleared to undergo procedure. Patient underwent Right hip Intramedullary Nailing on 4/21/2025 by Dr. Mason Munguia. Procedure was well tolerated.  No operative or suman-operative complications arose during patient's hospital course.  Patient received antibiotic according to SCIP guidelines for infection prevention. Lovenox was given for DVT prophylaxis, in addition to the use of SCDs.  Anesthesia, Medical Hospitalist, Physical Therapy and Occupational Therapy were consulted. Patient is stable for discharge to rehab with a good prognosis.  Appropriate discharge instructions and medications are provided in this document.

## 2025-04-22 NOTE — DIETITIAN INITIAL EVALUATION ADULT - PERTINENT LABORATORY DATA
04-22    136  |  105  |  30[H]  ----------------------------<  160[H]  4.7   |  27  |  0.94    Ca    8.3[L]      22 Apr 2025 06:00  Phos  4.6     04-21  Mg     1.9     04-21    TPro  5.9[L]  /  Alb  2.3[L]  /  TBili  0.4  /  DBili  x   /  AST  24  /  ALT  21  /  AlkPhos  143[H]  04-21

## 2025-04-22 NOTE — CARE COORDINATION ASSESSMENT. - NSADDITIONAL INFORMATION_FT
Pt admitted with right gamma nail repair of fx femur s/p fall.  He lives with his wife. SW met with the pt and his wife and son who requested acute rehab at [facility]. Pt and his family were given a list of subacute rehab facilities to review in the event that a bed is not available at [facility]. Referral made.

## 2025-04-22 NOTE — DISCHARGE NOTE PROVIDER - NSDCMRMEDTOKEN_GEN_ALL_CORE_FT
acetaminophen 500 mg oral tablet: 2 tab(s) orally every 8 hours  ascorbic acid 500 mg oral tablet: 1 tab(s) orally once a day  aspirin 81 mg oral delayed release tablet: 2 tab(s) orally once a day  atorvastatin 20 mg oral tablet: 1 tab(s) orally once a day  cholecalciferol 25 mcg (1000 intl units) oral tablet: 1 tab(s) orally once a day  cyanocobalamin 50 mcg oral tablet: 1 tab(s) orally once a day  pancrelipase 36,000 units-114,000 units-180,000 units oral delayed release capsule: 2 cap(s) orally 3 times a day (before meals)  polyethylene glycol 3350 oral powder for reconstitution: 17 gram(s) orally once a day (at bedtime)  senna leaf extract oral tablet: 2 tab(s) orally once a day (at bedtime)   acetaminophen 500 mg oral tablet: 2 tab(s) orally every 8 hours  ascorbic acid 500 mg oral tablet: 1 tab(s) orally once a day  aspirin 81 mg oral delayed release tablet: 1 tab(s) orally once a day  atorvastatin 20 mg oral tablet: 1 tab(s) orally once a day  cholecalciferol 25 mcg (1000 intl units) oral tablet: 1 tab(s) orally once a day  cyanocobalamin 50 mcg oral tablet: 1 tab(s) orally once a day  enoxaparin 40 mg/0.4 mL injectable solution: 40 milligram(s) injectable once a day  meclizine 12.5 mg oral tablet: 1 tab(s) orally 3 times a day As needed Dizziness  oxyCODONE 10 mg oral tablet: 1 tab(s) orally every 3 hours As needed Severe Pain (7 - 10)  oxyCODONE 5 mg oral tablet: 1 tab(s) orally every 3 hours As needed Moderate Pain (4 - 6)  pancrelipase 36,000 units-114,000 units-180,000 units oral delayed release capsule: 2 cap(s) orally 3 times a day (before meals)  polyethylene glycol 3350 oral powder for reconstitution: 17 gram(s) orally once a day (at bedtime)  senna leaf extract oral tablet: 2 tab(s) orally once a day (at bedtime)

## 2025-04-22 NOTE — OCCUPATIONAL THERAPY INITIAL EVALUATION ADULT - ADDITIONAL COMMENTS
Upon assessment, the patient was not having any respirations.  No heart sounds were auscultated.  The family was at the bedside with the patient.  The Attending Physician was notified, as well as clergy, [facility], and [facility].   Pt lives w/ spouse in a private home. 2 MARIO no railing 3 steps inside the home + railing   + tub w/ grab bars shower chair. + comfort ht toilet w/ grab bars. Pt's spouse provided occ assist for ADL's and used a RW PTA

## 2025-04-22 NOTE — CARE COORDINATION ASSESSMENT. - NSPASTMEDSURGHISTORY_GEN_ALL_CORE_FT
PAST MEDICAL & SURGICAL HISTORY:  H/O gastric ulcer  back in 1978      Bipolar disorder      History of gastrectomy  in 1978, for gastric ulcer      MI (myocardial infarction)

## 2025-04-22 NOTE — PROGRESS NOTE ADULT - ASSESSMENT
The patient is an 79 year old male with a history of HTN, HL, CAD s/p PCI, anemia who presents with a fall.     Plan:  - ECG with sinus rhythm and no evidence of ischemia/infarction  - Echo 2/23 with normal LV systolic function, no significant valve issues  - Cardiac catheterization 2012 with mRCA 99% s/p PCI  - Continue aspirin 81 mg daily  - Continue atorvastatin 20 mg daily  - Ortho follow-up  - PT

## 2025-04-22 NOTE — DIETITIAN INITIAL EVALUATION ADULT - PERTINENT MEDS FT
MEDICATIONS  (STANDING):  acetaminophen     Tablet .. 1000 milliGRAM(s) Oral every 8 hours  acetaminophen   IVPB .. 1000 milliGRAM(s) IV Intermittent once  aprepitant 40 milliGRAM(s) Oral once  ascorbic acid 500 milliGRAM(s) Oral daily  atorvastatin 20 milliGRAM(s) Oral at bedtime  ceFAZolin   IVPB 2000 milliGRAM(s) IV Intermittent once  chlorhexidine 2% Cloths 1 Application(s) Topical once  cholecalciferol 1000 Unit(s) Oral daily  cyanocobalamin 1000 MICROGram(s) Oral daily  enoxaparin Injectable 40 milliGRAM(s) SubCutaneous every 24 hours  pantoprazole    Tablet 40 milliGRAM(s) Oral before breakfast  polyethylene glycol 3350 17 Gram(s) Oral at bedtime  senna 2 Tablet(s) Oral at bedtime  tranexamic acid IVPB 1000 milliGRAM(s) IV Intermittent once  tranexamic acid IVPB 1000 milliGRAM(s) IV Intermittent once    MEDICATIONS  (PRN):  magnesium hydroxide Suspension 30 milliLiter(s) Oral daily PRN Constipation  meclizine 12.5 milliGRAM(s) Oral three times a day PRN Dizziness  ondansetron Injectable 4 milliGRAM(s) IV Push every 6 hours PRN Nausea and/or Vomiting  oxyCODONE    IR 5 milliGRAM(s) Oral every 3 hours PRN Moderate Pain (4 - 6)  oxyCODONE    IR 10 milliGRAM(s) Oral every 3 hours PRN Severe Pain (7 - 10)

## 2025-04-22 NOTE — DIETITIAN INITIAL EVALUATION ADULT - OTHER INFO
Height/ Weight: Pt reports a ht of 6 feet with a UBW of 145 lbs consistent with admission wt of 145.5 lbs. Will continue to monitor weights and trend as available.     Pt currently s/p IMN of right femur, currently on a regular diet consuming 75% of meals provided in-house. Pt states poor PO intake at lunch due to dislike of institutionalized foods. At time of visit obtained preferences for dinner; food and nutrition department made aware. Provided pt with hospital menu and made aware food preferences to be obtained daily by diet tech to optimize PO intake.   Verbally discussed importance of consuming adequate protein intake at meals to optimize nutrition status. Patient with no nutrition-related questions at this time. Made aware RD remains available as needed.

## 2025-04-22 NOTE — PHYSICAL THERAPY INITIAL EVALUATION ADULT - ADDITIONAL COMMENTS
Lives in house with spouse.  2 MARIO without HR; 3 inside with HR.  Owns Rolling Walker.  Has tub shower.

## 2025-04-22 NOTE — CARE COORDINATION ASSESSMENT. - NSCAREPROVIDERS_GEN_ALL_CORE_FT
CARE PROVIDERS:  Administration: Lesley Dupont  Admitting: Mason Munguia  Attending: Mason Munguia  Case Management: Santaromana, Anna  Consultant: Triston Boykin  Consultant: Tammy Knight  Consultant: Bradley Walker  Infection Control: Olga Kaye  Nurse: Miranda Muller  Nurse: Abigail Bethea  Nurse: Sabina Odonnell  Nurse: Paty Kaye  Occupational Therapy: Asuncion Mo  Outpatient Provider: Derrick Ayoub  Override: Kirsten Timmons  Patient Logistics: Merna Ayala  PCA/Nursing Assistant: Shagufta Smiley  PCA/Nursing Assistant: Brenda Martínez  PCA/Nursing Assistant: Venessa Loco  Physical Therapy: Harrison Restrepo  Physical Therapy: Bryson Valadez  Primary Team: Real Mar  Primary Team: Suasn Grider  Primary Team: Ivelisse Fitzgerald  Primary Team: Harry Dutton  Registered Dietitian: Dara Parrish  Registered Dietitian: Glo Krishnan  Research: Hieu Dickson  Respiratory Therapy: Harry Mckenna  : Angelita Zavala  : Karmen Medrano  Team: [facility] Palliative [facility], Team  UR// Supp. Assoc.: Silvina Mccartney  UR// Supp. Assoc.: Rosina Gunn

## 2025-04-22 NOTE — DIETITIAN INITIAL EVALUATION ADULT - REASON FOR ADMISSION
hip fx   As per H&P "Pt is an 79-year-old male with history of hyperlipidemia, GERD has presenting for right leg pain status post mechanical fall about 7:30 PM this evening.  Denies head injury or LOC.  Patient states he was using his walker walking into the kitchen when he tripped over the wheel and fell onto his right leg.  Denies chest pain.  States he was not able to bear weight on the right leg.  Pain is 10 out of 10.  Patient reports he only takes a baby aspirin does not take any other anticoagulants. In ER patient was found to have hip fx. patient is being admitted for further work up and treatment."

## 2025-04-23 ENCOUNTER — INPATIENT (INPATIENT)
Facility: HOSPITAL | Age: 80
LOS: 16 days | Discharge: HOME HEALTH | DRG: 559 | End: 2025-05-10
Attending: PHYSICAL MEDICINE & REHABILITATION | Admitting: PHYSICAL MEDICINE & REHABILITATION
Payer: MEDICARE

## 2025-04-23 ENCOUNTER — TRANSCRIPTION ENCOUNTER (OUTPATIENT)
Age: 80
End: 2025-04-23

## 2025-04-23 VITALS
DIASTOLIC BLOOD PRESSURE: 69 MMHG | WEIGHT: 154.32 LBS | HEART RATE: 78 BPM | HEIGHT: 72 IN | SYSTOLIC BLOOD PRESSURE: 133 MMHG | TEMPERATURE: 98 F | RESPIRATION RATE: 16 BRPM | OXYGEN SATURATION: 100 %

## 2025-04-23 VITALS — DIASTOLIC BLOOD PRESSURE: 63 MMHG | SYSTOLIC BLOOD PRESSURE: 134 MMHG | HEART RATE: 102 BPM

## 2025-04-23 DIAGNOSIS — S72.146A NONDISPLACED INTERTROCHANTERIC FRACTURE OF UNSPECIFIED FEMUR, INITIAL ENCOUNTER FOR CLOSED FRACTURE: ICD-10-CM

## 2025-04-23 LAB
ALBUMIN SERPL ELPH-MCNC: 1.9 G/DL — LOW (ref 3.3–5)
ALP SERPL-CCNC: 114 U/L — SIGNIFICANT CHANGE UP (ref 30–120)
ALT FLD-CCNC: 20 U/L — SIGNIFICANT CHANGE UP (ref 10–60)
ANION GAP SERPL CALC-SCNC: 4 MMOL/L — LOW (ref 5–17)
AST SERPL-CCNC: 33 U/L — SIGNIFICANT CHANGE UP (ref 10–40)
BILIRUB SERPL-MCNC: 0.7 MG/DL — SIGNIFICANT CHANGE UP (ref 0.2–1.2)
BUN SERPL-MCNC: 41 MG/DL — HIGH (ref 7–23)
CALCIUM SERPL-MCNC: 8.5 MG/DL — SIGNIFICANT CHANGE UP (ref 8.4–10.5)
CHLORIDE SERPL-SCNC: 104 MMOL/L — SIGNIFICANT CHANGE UP (ref 96–108)
CO2 SERPL-SCNC: 29 MMOL/L — SIGNIFICANT CHANGE UP (ref 22–31)
CREAT SERPL-MCNC: 0.97 MG/DL — SIGNIFICANT CHANGE UP (ref 0.5–1.3)
EGFR: 78 ML/MIN/1.73M2 — SIGNIFICANT CHANGE UP
EGFR: 78 ML/MIN/1.73M2 — SIGNIFICANT CHANGE UP
GLUCOSE SERPL-MCNC: 120 MG/DL — HIGH (ref 70–99)
HCT VFR BLD CALC: 25.6 % — LOW (ref 39–50)
HGB BLD-MCNC: 8.2 G/DL — LOW (ref 13–17)
MCHC RBC-ENTMCNC: 28.1 PG — SIGNIFICANT CHANGE UP (ref 27–34)
MCHC RBC-ENTMCNC: 32 G/DL — SIGNIFICANT CHANGE UP (ref 32–36)
MCV RBC AUTO: 87.7 FL — SIGNIFICANT CHANGE UP (ref 80–100)
NRBC BLD AUTO-RTO: 0 /100 WBCS — SIGNIFICANT CHANGE UP (ref 0–0)
PLATELET # BLD AUTO: 290 K/UL — SIGNIFICANT CHANGE UP (ref 150–400)
POTASSIUM SERPL-MCNC: 4.5 MMOL/L — SIGNIFICANT CHANGE UP (ref 3.5–5.3)
POTASSIUM SERPL-SCNC: 4.5 MMOL/L — SIGNIFICANT CHANGE UP (ref 3.5–5.3)
PROT SERPL-MCNC: 5.3 G/DL — LOW (ref 6–8.3)
RBC # BLD: 2.92 M/UL — LOW (ref 4.2–5.8)
RBC # FLD: 14.6 % — HIGH (ref 10.3–14.5)
SARS-COV-2 RNA SPEC QL NAA+PROBE: SIGNIFICANT CHANGE UP
SODIUM SERPL-SCNC: 137 MMOL/L — SIGNIFICANT CHANGE UP (ref 135–145)
WBC # BLD: 10.55 K/UL — HIGH (ref 3.8–10.5)
WBC # FLD AUTO: 10.55 K/UL — HIGH (ref 3.8–10.5)

## 2025-04-23 PROCEDURE — C9399: CPT

## 2025-04-23 PROCEDURE — 36430 TRANSFUSION BLD/BLD COMPNT: CPT

## 2025-04-23 PROCEDURE — 86923 COMPATIBILITY TEST ELECTRIC: CPT

## 2025-04-23 PROCEDURE — 83036 HEMOGLOBIN GLYCOSYLATED A1C: CPT

## 2025-04-23 PROCEDURE — 97165 OT EVAL LOW COMPLEX 30 MIN: CPT

## 2025-04-23 PROCEDURE — 36415 COLL VENOUS BLD VENIPUNCTURE: CPT

## 2025-04-23 PROCEDURE — 76000 FLUOROSCOPY <1 HR PHYS/QHP: CPT

## 2025-04-23 PROCEDURE — 86850 RBC ANTIBODY SCREEN: CPT

## 2025-04-23 PROCEDURE — 86901 BLOOD TYPING SEROLOGIC RH(D): CPT

## 2025-04-23 PROCEDURE — 97530 THERAPEUTIC ACTIVITIES: CPT

## 2025-04-23 PROCEDURE — 84100 ASSAY OF PHOSPHORUS: CPT

## 2025-04-23 PROCEDURE — 82310 ASSAY OF CALCIUM: CPT

## 2025-04-23 PROCEDURE — 80053 COMPREHEN METABOLIC PANEL: CPT

## 2025-04-23 PROCEDURE — 84443 ASSAY THYROID STIM HORMONE: CPT

## 2025-04-23 PROCEDURE — P9016: CPT

## 2025-04-23 PROCEDURE — 97116 GAIT TRAINING THERAPY: CPT

## 2025-04-23 PROCEDURE — 82306 VITAMIN D 25 HYDROXY: CPT

## 2025-04-23 PROCEDURE — 83735 ASSAY OF MAGNESIUM: CPT

## 2025-04-23 PROCEDURE — 97161 PT EVAL LOW COMPLEX 20 MIN: CPT

## 2025-04-23 PROCEDURE — 85027 COMPLETE CBC AUTOMATED: CPT

## 2025-04-23 PROCEDURE — 86900 BLOOD TYPING SEROLOGIC ABO: CPT

## 2025-04-23 PROCEDURE — 80048 BASIC METABOLIC PNL TOTAL CA: CPT

## 2025-04-23 PROCEDURE — C1713: CPT

## 2025-04-23 RX ORDER — MECLIZINE HCL 12.5 MG
1 TABLET ORAL
Qty: 0 | Refills: 0 | DISCHARGE
Start: 2025-04-23

## 2025-04-23 RX ORDER — ENOXAPARIN SODIUM 100 MG/ML
40 INJECTION SUBCUTANEOUS
Qty: 0 | Refills: 0 | DISCHARGE
Start: 2025-04-23

## 2025-04-23 RX ORDER — OXYCODONE HYDROCHLORIDE 30 MG/1
1 TABLET ORAL
Qty: 0 | Refills: 0 | DISCHARGE
Start: 2025-04-23

## 2025-04-23 RX ORDER — ASPIRIN 325 MG
1 TABLET ORAL
Qty: 0 | Refills: 0 | DISCHARGE
Start: 2025-04-23

## 2025-04-23 RX ORDER — OXYCODONE HYDROCHLORIDE 30 MG/1
5 TABLET ORAL
Refills: 0 | Status: DISCONTINUED | OUTPATIENT
Start: 2025-04-23 | End: 2025-04-29

## 2025-04-23 RX ORDER — LACTULOSE 10 G/15ML
20 SOLUTION ORAL
Refills: 0 | Status: COMPLETED | OUTPATIENT
Start: 2025-04-23 | End: 2025-04-23

## 2025-04-23 RX ORDER — ATORVASTATIN CALCIUM 80 MG/1
20 TABLET, FILM COATED ORAL AT BEDTIME
Refills: 0 | Status: DISCONTINUED | OUTPATIENT
Start: 2025-04-23 | End: 2025-05-10

## 2025-04-23 RX ORDER — LIPASE/PROTEASE/AMYLASE 10K-37.5K
2 CAPSULE,DELAYED RELEASE (ENTERIC COATED) ORAL
Refills: 0 | Status: DISCONTINUED | OUTPATIENT
Start: 2025-04-23 | End: 2025-05-10

## 2025-04-23 RX ORDER — ASPIRIN 325 MG
81 TABLET ORAL DAILY
Refills: 0 | Status: DISCONTINUED | OUTPATIENT
Start: 2025-04-24 | End: 2025-05-10

## 2025-04-23 RX ORDER — MECLIZINE HCL 12.5 MG
12.5 TABLET ORAL THREE TIMES A DAY
Refills: 0 | Status: DISCONTINUED | OUTPATIENT
Start: 2025-04-23 | End: 2025-05-10

## 2025-04-23 RX ORDER — CYANOCOBALAMIN 1000 UG/ML
1000 INJECTION INTRAMUSCULAR; SUBCUTANEOUS DAILY
Refills: 0 | Status: DISCONTINUED | OUTPATIENT
Start: 2025-04-24 | End: 2025-05-10

## 2025-04-23 RX ORDER — OXYCODONE HYDROCHLORIDE 30 MG/1
10 TABLET ORAL
Refills: 0 | Status: DISCONTINUED | OUTPATIENT
Start: 2025-04-23 | End: 2025-04-29

## 2025-04-23 RX ORDER — POLYETHYLENE GLYCOL 3350 17 G/17G
17 POWDER, FOR SOLUTION ORAL AT BEDTIME
Refills: 0 | Status: DISCONTINUED | OUTPATIENT
Start: 2025-04-23 | End: 2025-04-25

## 2025-04-23 RX ORDER — SENNA 187 MG
2 TABLET ORAL AT BEDTIME
Refills: 0 | Status: DISCONTINUED | OUTPATIENT
Start: 2025-04-23 | End: 2025-05-10

## 2025-04-23 RX ORDER — ENOXAPARIN SODIUM 100 MG/ML
40 INJECTION SUBCUTANEOUS EVERY 24 HOURS
Refills: 0 | Status: DISCONTINUED | OUTPATIENT
Start: 2025-04-24 | End: 2025-05-10

## 2025-04-23 RX ORDER — ACETAMINOPHEN 500 MG/5ML
650 LIQUID (ML) ORAL EVERY 6 HOURS
Refills: 0 | Status: DISCONTINUED | OUTPATIENT
Start: 2025-04-23 | End: 2025-05-10

## 2025-04-23 RX ADMIN — Medication 1000 UNIT(S): at 13:10

## 2025-04-23 RX ADMIN — Medication 1000 MILLIGRAM(S): at 06:35

## 2025-04-23 RX ADMIN — LACTULOSE 20 GRAM(S): 10 SOLUTION ORAL at 18:20

## 2025-04-23 RX ADMIN — Medication 1000 MILLIGRAM(S): at 06:20

## 2025-04-23 RX ADMIN — Medication 2 CAPSULE(S): at 18:35

## 2025-04-23 RX ADMIN — Medication 1000 MILLIGRAM(S): at 13:41

## 2025-04-23 RX ADMIN — ENOXAPARIN SODIUM 40 MILLIGRAM(S): 100 INJECTION SUBCUTANEOUS at 13:07

## 2025-04-23 RX ADMIN — Medication 40 MILLIGRAM(S): at 06:21

## 2025-04-23 RX ADMIN — POLYETHYLENE GLYCOL 3350 17 GRAM(S): 17 POWDER, FOR SOLUTION ORAL at 22:38

## 2025-04-23 RX ADMIN — Medication 1000 MILLIGRAM(S): at 13:11

## 2025-04-23 RX ADMIN — Medication 2 TABLET(S): at 22:38

## 2025-04-23 RX ADMIN — CYANOCOBALAMIN 1000 MICROGRAM(S): 1000 INJECTION INTRAMUSCULAR; SUBCUTANEOUS at 13:10

## 2025-04-23 RX ADMIN — LACTULOSE 20 GRAM(S): 10 SOLUTION ORAL at 20:31

## 2025-04-23 RX ADMIN — ATORVASTATIN CALCIUM 20 MILLIGRAM(S): 80 TABLET, FILM COATED ORAL at 22:38

## 2025-04-23 RX ADMIN — Medication 500 MILLIGRAM(S): at 13:10

## 2025-04-23 NOTE — SOCIAL WORK PROGRESS NOTE - NSSWPROGRESSNOTE_GEN_ALL_CORE
VIKTORIA dc note: patient accepted to AR at [facility], I met with patient who agrees with plan and he asked me to call wife Anna [phone] she also agreed with plan. VIKTORIA spoke with nurse who feels needs ambulance. I setup ambulance for today 3pm. I updated nurse and team. DC packet on unit. Plan for AR at [facility] today

## 2025-04-23 NOTE — DISCHARGE NOTE NURSING/CASE MANAGEMENT/SOCIAL WORK - FINANCIAL ASSISTANCE
[facility] provides services at a reduced cost to those who are determined to be eligible through [facility]’s financial assistance program. Information regarding [facility]’s financial assistance program can be found by going to [email or web address] or by calling [phone].

## 2025-04-23 NOTE — PROGRESS NOTE ADULT - SUBJECTIVE AND OBJECTIVE BOX
Chief Complaint: Fall    Interval Events: No events overnight.    Review of Systems:  General: No fevers, chills, weight gain  Skin: No rashes, color changes  Cardiovascular: No chest pain, orthopnea  Respiratory: No shortness of breath, cough  Gastrointestinal: No nausea, abdominal pain  Genitourinary: No incontinence, pain with urination  Musculoskeletal: No pain, swelling, decreased range of motion  Neurological: No headache, weakness  Psychiatric: No depression, anxiety  Endocrine: No weight gain, increased thirst  All other systems are comprehensively negative.    Physical Exam:  Vitals:        Vital Signs Last 24 Hrs  T(C): 36.7 (22 Apr 2025 08:44), Max: 36.9 (21 Apr 2025 16:01)  T(F): 98.1 (22 Apr 2025 08:44), Max: 98.4 (21 Apr 2025 16:01)  HR: 68 (22 Apr 2025 08:44) (68 - 112)  BP: 114/58 (22 Apr 2025 09:03) (112/92 - 153/59)  BP(mean): --  RR: 16 (22 Apr 2025 08:44) (11 - 20)  SpO2: 99% (22 Apr 2025 08:44) (97% - 100%)  Parameters below as of 22 Apr 2025 08:44  Patient On (Oxygen Delivery Method): room air  General: NAD  HEENT: MMM  Neck: No JVD, no carotid bruit  Lungs: CTAB  CV: RRR, nl S1/S2, no M/R/G  Abdomen: S/NT/ND, +BS  Extremities: No LE edema, no cyanosis  Neuro: AAOx3, non-focal  Skin: No rash    Labs:                        8.0    9.35  )-----------( 246      ( 22 Apr 2025 06:00 )             24.5     04-21    138  |  106  |  28[H]  ----------------------------<  119[H]  4.2   |  26  |  0.76    Ca    8.8      21 Apr 2025 16:30  Phos  4.6     04-21  Mg     1.9     04-21    TPro  5.9[L]  /  Alb  2.3[L]  /  TBili  0.4  /  DBili  x   /  AST  24  /  ALT  21  /  AlkPhos  143[H]  04-21        PT/INR - ( 21 Apr 2025 00:32 )   PT: 11.9 sec;   INR: 1.02 ratio         PTT - ( 21 Apr 2025 00:32 )  PTT:30.8 sec
Date of Service 04-22-25 @ 12:25    Patient is a 79y old  Male who presents with a chief complaint of Right hip fracture (22 Apr 2025 09:14)      INTERVAL /OVERNIGHT EVENTS: felt dizzy earlier    MEDICATIONS  (STANDING):  acetaminophen     Tablet .. 1000 milliGRAM(s) Oral every 8 hours  acetaminophen   IVPB .. 1000 milliGRAM(s) IV Intermittent once  aprepitant 40 milliGRAM(s) Oral once  ascorbic acid 500 milliGRAM(s) Oral daily  atorvastatin 20 milliGRAM(s) Oral at bedtime  ceFAZolin   IVPB 2000 milliGRAM(s) IV Intermittent once  chlorhexidine 2% Cloths 1 Application(s) Topical once  cholecalciferol 1000 Unit(s) Oral daily  cyanocobalamin 1000 MICROGram(s) Oral daily  enoxaparin Injectable 40 milliGRAM(s) SubCutaneous every 24 hours  pantoprazole    Tablet 40 milliGRAM(s) Oral before breakfast  polyethylene glycol 3350 17 Gram(s) Oral at bedtime  senna 2 Tablet(s) Oral at bedtime  tranexamic acid IVPB 1000 milliGRAM(s) IV Intermittent once  tranexamic acid IVPB 1000 milliGRAM(s) IV Intermittent once    MEDICATIONS  (PRN):  magnesium hydroxide Suspension 30 milliLiter(s) Oral daily PRN Constipation  meclizine 12.5 milliGRAM(s) Oral three times a day PRN Dizziness  ondansetron Injectable 4 milliGRAM(s) IV Push every 6 hours PRN Nausea and/or Vomiting  oxyCODONE    IR 5 milliGRAM(s) Oral every 3 hours PRN Moderate Pain (4 - 6)  oxyCODONE    IR 10 milliGRAM(s) Oral every 3 hours PRN Severe Pain (7 - 10)      Allergies    No Known Allergies    Intolerances        REVIEW OF SYSTEMS:  CONSTITUTIONAL: No fever, weight loss, or fatigue  EYES: No eye pain, visual disturbances, or discharge  ENMT:  No difficulty hearing, tinnitus, vertigo; No sinus or throat pain  NECK: No pain or stiffness  RESPIRATORY: No cough, wheezing, chills or hemoptysis; No shortness of breath  CARDIOVASCULAR: No chest pain, palpitations, dizziness, or leg swelling  GASTROINTESTINAL: No abdominal or epigastric pain. No nausea, vomiting, or hematemesis; No diarrhea or constipation. No melena or hematochezia.  GENITOURINARY: No dysuria, frequency, hematuria, or incontinence  NEUROLOGICAL: No headaches, memory loss, loss of strength, numbness, or tremors  SKIN: No itching, burning, rashes, or lesions   LYMPH NODES: No enlarged glands  ENDOCRINE: No heat or cold intolerance; No hair loss; No polydipsia or polyuria  MUSCULOSKELETAL: + joint pain or swelling; No muscle, back, or extremity pain  PSYCHIATRIC: No depression, anxiety, mood swings, or difficulty sleeping  HEME/LYMPH: No easy bruising, or bleeding gums  ALLERGY AND IMMUNOLOGIC: No hives or eczema    Vital Signs Last 24 Hrs  T(C): 36.7 (22 Apr 2025 08:44), Max: 36.9 (21 Apr 2025 16:01)  T(F): 98.1 (22 Apr 2025 08:44), Max: 98.4 (21 Apr 2025 16:01)  HR: 68 (22 Apr 2025 08:44) (68 - 97)  BP: 114/58 (22 Apr 2025 09:16) (112/92 - 153/59)  BP(mean): --  RR: 16 (22 Apr 2025 08:44) (11 - 20)  SpO2: 99% (22 Apr 2025 08:44) (97% - 100%)    Parameters below as of 22 Apr 2025 08:44  Patient On (Oxygen Delivery Method): room air        PHYSICAL EXAM:  GENERAL: NAD, well-groomed, well-developed  HEAD:  Atraumatic, Normocephalic  EYES: EOMI, PERRLA, conjunctiva and sclera clear  ENMT: No tonsillar erythema, exudates, or enlargement; Moist mucous membranes, Good dentition, No lesions  NECK: Supple, No JVD, Normal thyroid  NERVOUS SYSTEM:  Alert & Oriented X3, Good concentration; Motor Strength 5/5 B/L upper and lower extremities; DTRs 2+ intact and symmetric  CHEST/LUNG: Clear to auscultation bilaterally; No rales, rhonchi, wheezing, or rubs  HEART: Regular rate and rhythm; No murmurs, rubs, or gallops  ABDOMEN: Soft, Nontender, Nondistended; Bowel sounds present  EXTREMITIES:  2+ Peripheral Pulses, No clubbing, cyanosis, or edema  LYMPH: No lymphadenopathy noted  SKIN: No rashes or lesions    LABS:                        8.0    9.35  )-----------( 246      ( 22 Apr 2025 06:00 )             24.5     22 Apr 2025 06:00    136    |  105    |  30     ----------------------------<  160    4.7     |  27     |  0.94     Ca    8.3        22 Apr 2025 06:00  Phos  4.6       21 Apr 2025 16:30  Mg     1.9       21 Apr 2025 16:30      PT/INR - ( 21 Apr 2025 00:32 )   PT: 11.9 sec;   INR: 1.02 ratio         PTT - ( 21 Apr 2025 00:32 )  PTT:30.8 sec  Urinalysis Basic - ( 22 Apr 2025 06:00 )    Color: x / Appearance: x / SG: x / pH: x  Gluc: 160 mg/dL / Ketone: x  / Bili: x / Urobili: x   Blood: x / Protein: x / Nitrite: x   Leuk Esterase: x / RBC: x / WBC x   Sq Epi: x / Non Sq Epi: x / Bacteria: x      CAPILLARY BLOOD GLUCOSE          RADIOLOGY & ADDITIONAL TESTS:    Notes Reviewed:  [x ] YES  [ ] NO    Care Discussed with Consultants/Other Providers [x ] YES  [ ] NO
Orthopedics     POD # 1s/p Right Hip Intramedullary Nailing   Pain is controlled. Pt feeling well. Denies nausea, vomiting, diarrhea, chest pain, shortness of breath, numbness, tingling.    Vital Signs Last 24 Hrs  T(C): 36.7 (04-22-25 @ 08:44), Max: 36.9 (04-21-25 @ 16:01)  T(F): 98.1 (04-22-25 @ 08:44), Max: 98.4 (04-21-25 @ 16:01)  HR: 68 (04-22-25 @ 08:44) (68 - 112)  BP: 114/58 (04-22-25 @ 09:03) (112/92 - 153/59)  BP(mean): --  RR: 16 (04-22-25 @ 08:44) (11 - 20)  SpO2: 99% (04-22-25 @ 08:44) (97% - 100%)                        8.0    9.35  )-----------( 246      ( 22 Apr 2025 06:00 )             24.5     04-21    138  |  106  |  28[H]  ----------------------------<  119[H]  4.2   |  26  |  0.76    Ca    8.8      21 Apr 2025 16:30  Phos  4.6     04-21  Mg     1.9     04-21    TPro  5.9[L]  /  Alb  2.3[L]  /  TBili  0.4  /  DBili  x   /  AST  24  /  ALT  21  /  AlkPhos  143[H]  04-21    PT/INR - ( 21 Apr 2025 00:32 )   PT: 11.9 sec;   INR: 1.02 ratio         PTT - ( 21 Apr 2025 00:32 )  PTT:30.8 sec    Exam:  NAD AAOx3          Hip: Dressing clean, dry and intact  Bilateral LE's:  5/5 plantarflexion/dorsiflexion/EHL  +DP pulse  Calf Soft, NT        A/P:  Stable POD # 1 s/p Right hip Intramedullary Nailing  -Pain control  -DVT PE ppx: Lovenox 40mg qd  -OOB PT   -WBAT RLE  -Dispo: pending PT/OT/medical progress
Orthopedics     POD # 2 s/p Right Hip Intramedullary Nailing   Pain is controlled. Pt feeling well. Denies nausea, vomiting, diarrhea, chest pain, shortness of breath, headache, dizziness.    Vital Signs Last 24 Hrs  T(C): 36.6 (04-22-25 @ 23:25), Max: 36.7 (04-22-25 @ 08:44)  T(F): 97.8 (04-22-25 @ 23:25), Max: 98.1 (04-22-25 @ 08:44)  HR: 85 (04-22-25 @ 23:25) (65 - 85)  BP: 119/60 (04-22-25 @ 23:25) (107/52 - 121/57)  BP(mean): --  RR: 16 (04-22-25 @ 23:25) (14 - 18)  SpO2: 94% (04-22-25 @ 23:25) (94% - 99%)                        8.2    10.73 )-----------( 249      ( 22 Apr 2025 19:44 )             25.3     04-22    136  |  105  |  30[H]  ----------------------------<  160[H]  4.7   |  27  |  0.94    Ca    8.3[L]      22 Apr 2025 06:00  Phos  4.6     04-21  Mg     1.9     04-21          Exam:  NAD AAOx3          Right Hip: Dressing clean, dry and intact  Bilateral LE's:  5/5 plantarflexion/dorsiflexion/EHL  +DP pulse  Calf Soft, NT      A/P:  Stable POD # 2 s/p Right Hip Intramedullary Nailing   -Pain control  -DVT PE ppx: Lovenox 40mg qd  -OOB PT   -WBAT RLE  -Dispo: rehab pending placement/medical clearance  
Chief Complaint: Fall    Interval Events: No events overnight.    Review of Systems:  General: No fevers, chills, weight gain  Skin: No rashes, color changes  Cardiovascular: No chest pain, orthopnea  Respiratory: No shortness of breath, cough  Gastrointestinal: No nausea, abdominal pain  Genitourinary: No incontinence, pain with urination  Musculoskeletal: No pain, swelling, decreased range of motion  Neurological: No headache, weakness  Psychiatric: No depression, anxiety  Endocrine: No weight gain, increased thirst  All other systems are comprehensively negative.    Physical Exam:  Vital Signs Last 24 Hrs  T(C): 36.5 (23 Apr 2025 08:20), Max: 36.7 (22 Apr 2025 13:48)  T(F): 97.7 (23 Apr 2025 08:20), Max: 98 (22 Apr 2025 13:48)  HR: 102 (23 Apr 2025 09:27) (65 - 102)  BP: 134/63 (23 Apr 2025 09:27) (107/52 - 134/63)  BP(mean): --  RR: 16 (23 Apr 2025 08:20) (14 - 18)  SpO2: 98% (23 Apr 2025 08:20) (94% - 98%)  Parameters below as of 22 Apr 2025 23:25  Patient On (Oxygen Delivery Method): room air  General: NAD  HEENT: MMM  Neck: No JVD, no carotid bruit  Lungs: CTAB  CV: RRR, nl S1/S2, no M/R/G  Abdomen: S/NT/ND, +BS  Extremities: No LE edema, no cyanosis  Neuro: AAOx3, non-focal  Skin: No rash    Labs:             04-23    137  |  104  |  41[H]  ----------------------------<  120[H]  4.5   |  29  |  0.97    Ca    8.5      23 Apr 2025 06:00  Phos  4.6     04-21  Mg     1.9     04-21    TPro  5.3[L]  /  Alb  1.9[L]  /  TBili  0.7  /  DBili  x   /  AST  33  /  ALT  20  /  AlkPhos  114  04-23                        8.2    10.55 )-----------( 290      ( 23 Apr 2025 06:00 )             25.6   
  Date of Service: 04-23-25 @ 10:06    Patient is a 79y old  Male who presents with a chief complaint of hip fx (23 Apr 2025 07:30)       INTERVAL HPI/OVERNIGHT EVENTS: stable, pod 2, feels well    MEDICATIONS  (STANDING):  acetaminophen     Tablet .. 1000 milliGRAM(s) Oral every 8 hours  acetaminophen   IVPB .. 1000 milliGRAM(s) IV Intermittent once  aprepitant 40 milliGRAM(s) Oral once  ascorbic acid 500 milliGRAM(s) Oral daily  atorvastatin 20 milliGRAM(s) Oral at bedtime  ceFAZolin   IVPB 2000 milliGRAM(s) IV Intermittent once  chlorhexidine 2% Cloths 1 Application(s) Topical once  cholecalciferol 1000 Unit(s) Oral daily  cyanocobalamin 1000 MICROGram(s) Oral daily  enoxaparin Injectable 40 milliGRAM(s) SubCutaneous every 24 hours  pantoprazole    Tablet 40 milliGRAM(s) Oral before breakfast  polyethylene glycol 3350 17 Gram(s) Oral at bedtime  senna 2 Tablet(s) Oral at bedtime  tranexamic acid IVPB 1000 milliGRAM(s) IV Intermittent once  tranexamic acid IVPB 1000 milliGRAM(s) IV Intermittent once    MEDICATIONS  (PRN):  bisacodyl Suppository 10 milliGRAM(s) Rectal once PRN Constipation  magnesium hydroxide Suspension 30 milliLiter(s) Oral daily PRN Constipation  meclizine 12.5 milliGRAM(s) Oral three times a day PRN Dizziness  ondansetron Injectable 4 milliGRAM(s) IV Push every 6 hours PRN Nausea and/or Vomiting  oxyCODONE    IR 5 milliGRAM(s) Oral every 3 hours PRN Moderate Pain (4 - 6)  oxyCODONE    IR 10 milliGRAM(s) Oral every 3 hours PRN Severe Pain (7 - 10)      Allergies    No Known Allergies    Intolerances        REVIEW OF SYSTEMS:  CONSTITUTIONAL: No fever, weight loss, or fatigue  EYES: No eye pain, visual disturbances  ENMT:  No difficulty hearing, tinnitus, vertigo; No sinus or throat pain  NECK: No pain or stiffness  RESPIRATORY: No cough, wheezing, chills or hemoptysis; No shortness of breath  CARDIOVASCULAR: No chest pain, palpitations, dizziness  GASTROINTESTINAL: No abdominal or epigastric pain. No nausea, vomiting, or hematemesis; No diarrhea or constipation. No melena or hematochezia.  GENITOURINARY: No dysuria, frequency, hematuria, or incontinence  NEUROLOGICAL: No headaches, memory loss, loss of strength, numbness, or tremors  SKIN: No itching, burning  LYMPH NODES: No enlarged glands  MUSCULOSKELETAL: No joint pain or swelling; No muscle, back, or extremity pain  PSYCHIATRIC: No depression, mood swings  HEME/LYMPH: No easy bruising, or bleeding gums  ALLERGY AND IMMUNOLOGIC: No hives    Vital Signs Last 24 Hrs  T(C): 36.5 (23 Apr 2025 08:20), Max: 36.7 (22 Apr 2025 13:48)  T(F): 97.7 (23 Apr 2025 08:20), Max: 98 (22 Apr 2025 13:48)  HR: 102 (23 Apr 2025 09:27) (65 - 102)  BP: 134/63 (23 Apr 2025 09:27) (107/52 - 134/63)  BP(mean): --  RR: 16 (23 Apr 2025 08:20) (14 - 18)  SpO2: 98% (23 Apr 2025 08:20) (94% - 98%)    Parameters below as of 22 Apr 2025 23:25  Patient On (Oxygen Delivery Method): room air        PHYSICAL EXAM:  GENERAL: NAD, well-groomed, well-developed  HEAD:  Atraumatic, Normocephalic  EYES: EOMI, PERRLA, conjunctiva and sclera clear  ENMT: No tonsillar erythema, exudates, or enlargement   NECK: Supple, No JVD  NERVOUS SYSTEM:  Alert & Oriented X3, Good concentration  CHEST/LUNG: Clear to auscultation bilaterally; No rales, rhonchi, wheezing  HEART: Regular rate and rhythm  ABDOMEN: Soft, Nontender, Nondistended; Bowel sounds present  EXTREMITIES:  2+ Peripheral Pulses   LYMPH: No lymphadenopathy noted  SKIN: No rashes     LABS:                        8.2    10.55 )-----------( 290      ( 23 Apr 2025 06:00 )             25.6     23 Apr 2025 06:00    137    |  104    |  41     ----------------------------<  120    4.5     |  29     |  0.97     Ca    8.5        23 Apr 2025 06:00    TPro  5.3    /  Alb  1.9    /  TBili  0.7    /  DBili  x      /  AST  33     /  ALT  20     /  AlkPhos  114    23 Apr 2025 06:00      Urinalysis Basic - ( 23 Apr 2025 06:00 )    Color: x / Appearance: x / SG: x / pH: x  Gluc: 120 mg/dL / Ketone: x  / Bili: x / Urobili: x   Blood: x / Protein: x / Nitrite: x   Leuk Esterase: x / RBC: x / WBC x   Sq Epi: x / Non Sq Epi: x / Bacteria: x      CAPILLARY BLOOD GLUCOSE                RADIOLOGY & ADDITIONAL TESTS:      Consultant(s) Notes Reviewed:  [x ] YES  [ ] NO    Care Discussed with Consultants/Other Providers [x ] YES  [ ] NO    Advanced care planning discussed with patient and family, advanced care planning forms reviewed, discussed, and completed.  20 minutes spent.

## 2025-04-23 NOTE — PROGRESS NOTE ADULT - PROBLEM SELECTOR PLAN 2
ortho eval with dr. lancaster  surgical correction- s/p IMN yesterday  pain control
ortho eval with dr. lancaster  surgical correction- s/p IMN - pod 2  pain control  for antonette  trend labx - cbc stable

## 2025-04-23 NOTE — DISCHARGE NOTE NURSING/CASE MANAGEMENT/SOCIAL WORK - NSDCPEFALRISK_GEN_ALL_CORE
For information on Fall & Injury Prevention, visit: [email or web address] OR  [email or web address] OR  https://www.cdc.gov/steadi/patient.html

## 2025-04-23 NOTE — PROGRESS NOTE ADULT - PROBLEM SELECTOR PLAN 4
serial CBC  ? etiology  s/p blood transfusion  h/h stable - continue to monitor
serial CBC  ? etiology  PRBC PRN, s/p PRBC yesterday, PRBC transfusion again today  heme eval if necessary

## 2025-04-23 NOTE — GOALS OF CARE CONVERSATION - ADVANCED CARE PLANNING - CONVERSATION DETAILS

## 2025-04-23 NOTE — DISCHARGE NOTE NURSING/CASE MANAGEMENT/SOCIAL WORK - PATIENT PORTAL LINK FT
You can access the FollowMyHealth Patient Portal offered by [facility] by registering at the following website: [email or web address]. By joining [employer]’s FollowMyHealth portal, you will also be able to view your health information using other applications (apps) compatible with our system.

## 2025-04-24 LAB
24R-OH-CALCIDIOL SERPL-MCNC: 27.5 NG/ML — SIGNIFICANT CHANGE UP
ALBUMIN SERPL ELPH-MCNC: 1.9 G/DL — LOW (ref 3.3–5)
ALP SERPL-CCNC: 110 U/L — SIGNIFICANT CHANGE UP (ref 40–120)
ALT FLD-CCNC: 28 U/L — SIGNIFICANT CHANGE UP (ref 10–45)
ANION GAP SERPL CALC-SCNC: 6 MMOL/L — SIGNIFICANT CHANGE UP (ref 5–17)
AST SERPL-CCNC: 44 U/L — HIGH (ref 10–40)
BASOPHILS # BLD AUTO: 0.01 K/UL — SIGNIFICANT CHANGE UP (ref 0–0.2)
BASOPHILS NFR BLD AUTO: 0.1 % — SIGNIFICANT CHANGE UP (ref 0–2)
BILIRUB SERPL-MCNC: 0.6 MG/DL — SIGNIFICANT CHANGE UP (ref 0.2–1.2)
BUN SERPL-MCNC: 32 MG/DL — HIGH (ref 7–23)
CALCIUM SERPL-MCNC: 8.5 MG/DL — SIGNIFICANT CHANGE UP (ref 8.4–10.5)
CALCIUM SERPL-MCNC: 8.5 MG/DL — SIGNIFICANT CHANGE UP (ref 8.4–10.5)
CHLORIDE SERPL-SCNC: 105 MMOL/L — SIGNIFICANT CHANGE UP (ref 96–108)
CO2 SERPL-SCNC: 27 MMOL/L — SIGNIFICANT CHANGE UP (ref 22–31)
CREAT SERPL-MCNC: 0.85 MG/DL — SIGNIFICANT CHANGE UP (ref 0.5–1.3)
EGFR: 87 ML/MIN/1.73M2 — SIGNIFICANT CHANGE UP
EGFR: 87 ML/MIN/1.73M2 — SIGNIFICANT CHANGE UP
EOSINOPHIL # BLD AUTO: 0.12 K/UL — SIGNIFICANT CHANGE UP (ref 0–0.5)
EOSINOPHIL NFR BLD AUTO: 1.6 % — SIGNIFICANT CHANGE UP (ref 0–6)
FERRITIN SERPL-MCNC: 108 NG/ML — SIGNIFICANT CHANGE UP (ref 30–400)
FOLATE SERPL-MCNC: 5.6 NG/ML — SIGNIFICANT CHANGE UP
GLUCOSE SERPL-MCNC: 83 MG/DL — SIGNIFICANT CHANGE UP (ref 70–99)
HCT VFR BLD CALC: 26.7 % — LOW (ref 39–50)
HGB BLD-MCNC: 8.7 G/DL — LOW (ref 13–17)
IMM GRANULOCYTES NFR BLD AUTO: 0.6 % — SIGNIFICANT CHANGE UP (ref 0–0.9)
IRON SATN MFR SERPL: 24 UG/DL — LOW (ref 45–165)
IRON SATN MFR SERPL: 9 % — LOW (ref 16–55)
LYMPHOCYTES # BLD AUTO: 0.88 K/UL — LOW (ref 1–3.3)
LYMPHOCYTES # BLD AUTO: 11.4 % — LOW (ref 13–44)
MCHC RBC-ENTMCNC: 28.8 PG — SIGNIFICANT CHANGE UP (ref 27–34)
MCHC RBC-ENTMCNC: 32.6 G/DL — SIGNIFICANT CHANGE UP (ref 32–36)
MCV RBC AUTO: 88.4 FL — SIGNIFICANT CHANGE UP (ref 80–100)
MONOCYTES # BLD AUTO: 1.01 K/UL — HIGH (ref 0–0.9)
MONOCYTES NFR BLD AUTO: 13 % — SIGNIFICANT CHANGE UP (ref 2–14)
NEUTROPHILS # BLD AUTO: 5.67 K/UL — SIGNIFICANT CHANGE UP (ref 1.8–7.4)
NEUTROPHILS NFR BLD AUTO: 73.3 % — SIGNIFICANT CHANGE UP (ref 43–77)
NRBC BLD AUTO-RTO: 0 /100 WBCS — SIGNIFICANT CHANGE UP (ref 0–0)
PLATELET # BLD AUTO: 299 K/UL — SIGNIFICANT CHANGE UP (ref 150–400)
POTASSIUM SERPL-MCNC: 4.7 MMOL/L — SIGNIFICANT CHANGE UP (ref 3.5–5.3)
POTASSIUM SERPL-SCNC: 4.7 MMOL/L — SIGNIFICANT CHANGE UP (ref 3.5–5.3)
PROT SERPL-MCNC: 5.4 G/DL — LOW (ref 6–8.3)
PTH-INTACT FLD-MCNC: 26 PG/ML — SIGNIFICANT CHANGE UP (ref 15–65)
RBC # BLD: 3.02 M/UL — LOW (ref 4.2–5.8)
RBC # FLD: 14.6 % — HIGH (ref 10.3–14.5)
SODIUM SERPL-SCNC: 138 MMOL/L — SIGNIFICANT CHANGE UP (ref 135–145)
T4 FREE+ TSH PNL SERPL: 2.39 UIU/ML — SIGNIFICANT CHANGE UP (ref 0.27–4.2)
TIBC SERPL-MCNC: 268 UG/DL — SIGNIFICANT CHANGE UP (ref 220–430)
UIBC SERPL-MCNC: 244 UG/DL — SIGNIFICANT CHANGE UP (ref 110–370)
VIT D25+D1,25 OH+D1,25 PNL SERPL-MCNC: 70.3 PG/ML — SIGNIFICANT CHANGE UP (ref 19.9–79.3)
WBC # BLD: 7.74 K/UL — SIGNIFICANT CHANGE UP (ref 3.8–10.5)
WBC # FLD AUTO: 7.74 K/UL — SIGNIFICANT CHANGE UP (ref 3.8–10.5)

## 2025-04-24 PROCEDURE — 99222 1ST HOSP IP/OBS MODERATE 55: CPT | Mod: GC

## 2025-04-24 PROCEDURE — 99223 1ST HOSP IP/OBS HIGH 75: CPT

## 2025-04-24 RX ORDER — DEXTROMETHORPHAN HBR, GUAIFENESIN 200 MG/10ML
1200 LIQUID ORAL EVERY 12 HOURS
Refills: 0 | Status: DISCONTINUED | OUTPATIENT
Start: 2025-04-24 | End: 2025-04-25

## 2025-04-24 RX ORDER — BISACODYL 5 MG
10 TABLET, DELAYED RELEASE (ENTERIC COATED) ORAL ONCE
Refills: 0 | Status: COMPLETED | OUTPATIENT
Start: 2025-04-24 | End: 2025-04-24

## 2025-04-24 RX ORDER — LACTULOSE 10 G/15ML
20 SOLUTION ORAL
Refills: 0 | Status: DISCONTINUED | OUTPATIENT
Start: 2025-04-24 | End: 2025-04-25

## 2025-04-24 RX ADMIN — Medication 500 MILLIGRAM(S): at 12:07

## 2025-04-24 RX ADMIN — Medication 2 CAPSULE(S): at 08:27

## 2025-04-24 RX ADMIN — Medication 10 MILLIGRAM(S): at 17:24

## 2025-04-24 RX ADMIN — Medication 2 CAPSULE(S): at 17:24

## 2025-04-24 RX ADMIN — Medication 81 MILLIGRAM(S): at 12:07

## 2025-04-24 RX ADMIN — OXYCODONE HYDROCHLORIDE 10 MILLIGRAM(S): 30 TABLET ORAL at 13:13

## 2025-04-24 RX ADMIN — ENOXAPARIN SODIUM 40 MILLIGRAM(S): 100 INJECTION SUBCUTANEOUS at 06:20

## 2025-04-24 RX ADMIN — CYANOCOBALAMIN 1000 MICROGRAM(S): 1000 INJECTION INTRAMUSCULAR; SUBCUTANEOUS at 12:07

## 2025-04-24 RX ADMIN — Medication 1000 UNIT(S): at 12:07

## 2025-04-24 RX ADMIN — Medication 2 CAPSULE(S): at 12:07

## 2025-04-24 RX ADMIN — ATORVASTATIN CALCIUM 20 MILLIGRAM(S): 80 TABLET, FILM COATED ORAL at 21:07

## 2025-04-24 RX ADMIN — OXYCODONE HYDROCHLORIDE 10 MILLIGRAM(S): 30 TABLET ORAL at 15:08

## 2025-04-24 RX ADMIN — LACTULOSE 20 GRAM(S): 10 SOLUTION ORAL at 17:24

## 2025-04-25 PROCEDURE — 99232 SBSQ HOSP IP/OBS MODERATE 35: CPT

## 2025-04-25 RX ORDER — DEXTROMETHORPHAN HBR, GUAIFENESIN 200 MG/10ML
1200 LIQUID ORAL EVERY 12 HOURS
Refills: 0 | Status: DISCONTINUED | OUTPATIENT
Start: 2025-04-25 | End: 2025-05-10

## 2025-04-25 RX ORDER — POLYETHYLENE GLYCOL 3350 17 G/17G
17 POWDER, FOR SOLUTION ORAL EVERY 12 HOURS
Refills: 0 | Status: DISCONTINUED | OUTPATIENT
Start: 2025-04-25 | End: 2025-05-10

## 2025-04-25 RX ORDER — BISACODYL 5 MG
10 TABLET, DELAYED RELEASE (ENTERIC COATED) ORAL DAILY
Refills: 0 | Status: DISCONTINUED | OUTPATIENT
Start: 2025-04-25 | End: 2025-05-10

## 2025-04-25 RX ORDER — FERROUS SULFATE 137(45) MG
325 TABLET, EXTENDED RELEASE ORAL DAILY
Refills: 0 | Status: DISCONTINUED | OUTPATIENT
Start: 2025-04-25 | End: 2025-05-10

## 2025-04-25 RX ORDER — LACTULOSE 10 G/15ML
20 SOLUTION ORAL
Refills: 0 | Status: DISCONTINUED | OUTPATIENT
Start: 2025-04-25 | End: 2025-05-01

## 2025-04-25 RX ORDER — BISACODYL 5 MG
5 TABLET, DELAYED RELEASE (ENTERIC COATED) ORAL AT BEDTIME
Refills: 0 | Status: DISCONTINUED | OUTPATIENT
Start: 2025-04-25 | End: 2025-05-10

## 2025-04-25 RX ADMIN — CYANOCOBALAMIN 1000 MICROGRAM(S): 1000 INJECTION INTRAMUSCULAR; SUBCUTANEOUS at 11:50

## 2025-04-25 RX ADMIN — LACTULOSE 20 GRAM(S): 10 SOLUTION ORAL at 16:08

## 2025-04-25 RX ADMIN — ATORVASTATIN CALCIUM 20 MILLIGRAM(S): 80 TABLET, FILM COATED ORAL at 22:15

## 2025-04-25 RX ADMIN — Medication 2 CAPSULE(S): at 11:51

## 2025-04-25 RX ADMIN — Medication 500 MILLIGRAM(S): at 11:50

## 2025-04-25 RX ADMIN — DEXTROMETHORPHAN HBR, GUAIFENESIN 1200 MILLIGRAM(S): 200 LIQUID ORAL at 06:50

## 2025-04-25 RX ADMIN — Medication 1000 UNIT(S): at 11:50

## 2025-04-25 RX ADMIN — OXYCODONE HYDROCHLORIDE 10 MILLIGRAM(S): 30 TABLET ORAL at 11:30

## 2025-04-25 RX ADMIN — Medication 5 MILLIGRAM(S): at 22:15

## 2025-04-25 RX ADMIN — Medication 2 TABLET(S): at 22:14

## 2025-04-25 RX ADMIN — OXYCODONE HYDROCHLORIDE 10 MILLIGRAM(S): 30 TABLET ORAL at 10:32

## 2025-04-25 RX ADMIN — Medication 2 CAPSULE(S): at 09:10

## 2025-04-25 RX ADMIN — ENOXAPARIN SODIUM 40 MILLIGRAM(S): 100 INJECTION SUBCUTANEOUS at 06:51

## 2025-04-25 RX ADMIN — Medication 2 CAPSULE(S): at 17:54

## 2025-04-25 RX ADMIN — Medication 81 MILLIGRAM(S): at 11:50

## 2025-04-26 PROCEDURE — 93970 EXTREMITY STUDY: CPT | Mod: 26

## 2025-04-26 PROCEDURE — 99232 SBSQ HOSP IP/OBS MODERATE 35: CPT

## 2025-04-26 RX ADMIN — LACTULOSE 20 GRAM(S): 10 SOLUTION ORAL at 16:21

## 2025-04-26 RX ADMIN — Medication 1000 UNIT(S): at 11:34

## 2025-04-26 RX ADMIN — OXYCODONE HYDROCHLORIDE 10 MILLIGRAM(S): 30 TABLET ORAL at 11:36

## 2025-04-26 RX ADMIN — Medication 2 TABLET(S): at 21:29

## 2025-04-26 RX ADMIN — CYANOCOBALAMIN 1000 MICROGRAM(S): 1000 INJECTION INTRAMUSCULAR; SUBCUTANEOUS at 11:34

## 2025-04-26 RX ADMIN — Medication 5 MILLIGRAM(S): at 21:29

## 2025-04-26 RX ADMIN — Medication 81 MILLIGRAM(S): at 11:34

## 2025-04-26 RX ADMIN — Medication 2 CAPSULE(S): at 08:19

## 2025-04-26 RX ADMIN — Medication 325 MILLIGRAM(S): at 11:34

## 2025-04-26 RX ADMIN — POLYETHYLENE GLYCOL 3350 17 GRAM(S): 17 POWDER, FOR SOLUTION ORAL at 05:34

## 2025-04-26 RX ADMIN — Medication 500 MILLIGRAM(S): at 11:34

## 2025-04-26 RX ADMIN — Medication 2 CAPSULE(S): at 17:52

## 2025-04-26 RX ADMIN — ENOXAPARIN SODIUM 40 MILLIGRAM(S): 100 INJECTION SUBCUTANEOUS at 05:34

## 2025-04-26 RX ADMIN — ATORVASTATIN CALCIUM 20 MILLIGRAM(S): 80 TABLET, FILM COATED ORAL at 21:29

## 2025-04-26 RX ADMIN — Medication 2 CAPSULE(S): at 11:34

## 2025-04-27 PROCEDURE — 99232 SBSQ HOSP IP/OBS MODERATE 35: CPT

## 2025-04-27 RX ADMIN — Medication 2 CAPSULE(S): at 08:12

## 2025-04-27 RX ADMIN — Medication 500 MILLIGRAM(S): at 12:17

## 2025-04-27 RX ADMIN — ENOXAPARIN SODIUM 40 MILLIGRAM(S): 100 INJECTION SUBCUTANEOUS at 05:54

## 2025-04-27 RX ADMIN — Medication 2 TABLET(S): at 20:47

## 2025-04-27 RX ADMIN — CYANOCOBALAMIN 1000 MICROGRAM(S): 1000 INJECTION INTRAMUSCULAR; SUBCUTANEOUS at 12:17

## 2025-04-27 RX ADMIN — Medication 5 MILLIGRAM(S): at 20:47

## 2025-04-27 RX ADMIN — Medication 2 CAPSULE(S): at 12:17

## 2025-04-27 RX ADMIN — Medication 2 CAPSULE(S): at 17:35

## 2025-04-27 RX ADMIN — ATORVASTATIN CALCIUM 20 MILLIGRAM(S): 80 TABLET, FILM COATED ORAL at 20:47

## 2025-04-27 RX ADMIN — Medication 10 MILLIGRAM(S): at 20:48

## 2025-04-27 RX ADMIN — Medication 325 MILLIGRAM(S): at 12:17

## 2025-04-27 RX ADMIN — Medication 81 MILLIGRAM(S): at 12:16

## 2025-04-27 RX ADMIN — LACTULOSE 20 GRAM(S): 10 SOLUTION ORAL at 16:05

## 2025-04-27 RX ADMIN — Medication 1000 UNIT(S): at 12:17

## 2025-04-28 LAB
ALBUMIN SERPL ELPH-MCNC: 1.8 G/DL — LOW (ref 3.3–5)
ALP SERPL-CCNC: 115 U/L — SIGNIFICANT CHANGE UP (ref 40–120)
ALT FLD-CCNC: 31 U/L — SIGNIFICANT CHANGE UP (ref 10–45)
ANION GAP SERPL CALC-SCNC: 4 MMOL/L — LOW (ref 5–17)
AST SERPL-CCNC: 24 U/L — SIGNIFICANT CHANGE UP (ref 10–40)
BASOPHILS # BLD AUTO: 0.03 K/UL — SIGNIFICANT CHANGE UP (ref 0–0.2)
BASOPHILS NFR BLD AUTO: 0.4 % — SIGNIFICANT CHANGE UP (ref 0–2)
BILIRUB SERPL-MCNC: 1.2 MG/DL — SIGNIFICANT CHANGE UP (ref 0.2–1.2)
BUN SERPL-MCNC: 26 MG/DL — HIGH (ref 7–23)
CALCIUM SERPL-MCNC: 8.4 MG/DL — SIGNIFICANT CHANGE UP (ref 8.4–10.5)
CHLORIDE SERPL-SCNC: 101 MMOL/L — SIGNIFICANT CHANGE UP (ref 96–108)
CO2 SERPL-SCNC: 29 MMOL/L — SIGNIFICANT CHANGE UP (ref 22–31)
CREAT SERPL-MCNC: 0.84 MG/DL — SIGNIFICANT CHANGE UP (ref 0.5–1.3)
EGFR: 88 ML/MIN/1.73M2 — SIGNIFICANT CHANGE UP
EGFR: 88 ML/MIN/1.73M2 — SIGNIFICANT CHANGE UP
EOSINOPHIL # BLD AUTO: 0.22 K/UL — SIGNIFICANT CHANGE UP (ref 0–0.5)
EOSINOPHIL NFR BLD AUTO: 3.3 % — SIGNIFICANT CHANGE UP (ref 0–6)
GLUCOSE SERPL-MCNC: 93 MG/DL — SIGNIFICANT CHANGE UP (ref 70–99)
HCT VFR BLD CALC: 26.5 % — LOW (ref 39–50)
HGB BLD-MCNC: 8.3 G/DL — LOW (ref 13–17)
IMM GRANULOCYTES NFR BLD AUTO: 0.9 % — SIGNIFICANT CHANGE UP (ref 0–0.9)
LYMPHOCYTES # BLD AUTO: 0.59 K/UL — LOW (ref 1–3.3)
LYMPHOCYTES # BLD AUTO: 8.8 % — LOW (ref 13–44)
MCHC RBC-ENTMCNC: 28 PG — SIGNIFICANT CHANGE UP (ref 27–34)
MCHC RBC-ENTMCNC: 31.3 G/DL — LOW (ref 32–36)
MCV RBC AUTO: 89.5 FL — SIGNIFICANT CHANGE UP (ref 80–100)
MONOCYTES # BLD AUTO: 0.88 K/UL — SIGNIFICANT CHANGE UP (ref 0–0.9)
MONOCYTES NFR BLD AUTO: 13.1 % — SIGNIFICANT CHANGE UP (ref 2–14)
NEUTROPHILS # BLD AUTO: 4.93 K/UL — SIGNIFICANT CHANGE UP (ref 1.8–7.4)
NEUTROPHILS NFR BLD AUTO: 73.5 % — SIGNIFICANT CHANGE UP (ref 43–77)
NRBC BLD AUTO-RTO: 0 /100 WBCS — SIGNIFICANT CHANGE UP (ref 0–0)
PLATELET # BLD AUTO: 360 K/UL — SIGNIFICANT CHANGE UP (ref 150–400)
POTASSIUM SERPL-MCNC: 3.9 MMOL/L — SIGNIFICANT CHANGE UP (ref 3.5–5.3)
POTASSIUM SERPL-SCNC: 3.9 MMOL/L — SIGNIFICANT CHANGE UP (ref 3.5–5.3)
PROT SERPL-MCNC: 5.3 G/DL — LOW (ref 6–8.3)
RBC # BLD: 2.96 M/UL — LOW (ref 4.2–5.8)
RBC # FLD: 15.2 % — HIGH (ref 10.3–14.5)
SODIUM SERPL-SCNC: 134 MMOL/L — LOW (ref 135–145)
WBC # BLD: 6.71 K/UL — SIGNIFICANT CHANGE UP (ref 3.8–10.5)
WBC # FLD AUTO: 6.71 K/UL — SIGNIFICANT CHANGE UP (ref 3.8–10.5)

## 2025-04-28 PROCEDURE — 99232 SBSQ HOSP IP/OBS MODERATE 35: CPT | Mod: GC

## 2025-04-28 PROCEDURE — 99232 SBSQ HOSP IP/OBS MODERATE 35: CPT

## 2025-04-28 RX ADMIN — ATORVASTATIN CALCIUM 20 MILLIGRAM(S): 80 TABLET, FILM COATED ORAL at 20:55

## 2025-04-28 RX ADMIN — Medication 2 CAPSULE(S): at 12:21

## 2025-04-28 RX ADMIN — Medication 1000 UNIT(S): at 11:36

## 2025-04-28 RX ADMIN — Medication 325 MILLIGRAM(S): at 11:36

## 2025-04-28 RX ADMIN — Medication 10 MILLIGRAM(S): at 09:42

## 2025-04-28 RX ADMIN — Medication 5 MILLIGRAM(S): at 20:55

## 2025-04-28 RX ADMIN — OXYCODONE HYDROCHLORIDE 10 MILLIGRAM(S): 30 TABLET ORAL at 10:28

## 2025-04-28 RX ADMIN — OXYCODONE HYDROCHLORIDE 10 MILLIGRAM(S): 30 TABLET ORAL at 09:46

## 2025-04-28 RX ADMIN — OXYCODONE HYDROCHLORIDE 10 MILLIGRAM(S): 30 TABLET ORAL at 13:30

## 2025-04-28 RX ADMIN — OXYCODONE HYDROCHLORIDE 10 MILLIGRAM(S): 30 TABLET ORAL at 12:57

## 2025-04-28 RX ADMIN — Medication 81 MILLIGRAM(S): at 11:36

## 2025-04-28 RX ADMIN — Medication 2 CAPSULE(S): at 08:10

## 2025-04-28 RX ADMIN — ENOXAPARIN SODIUM 40 MILLIGRAM(S): 100 INJECTION SUBCUTANEOUS at 05:43

## 2025-04-28 RX ADMIN — CYANOCOBALAMIN 1000 MICROGRAM(S): 1000 INJECTION INTRAMUSCULAR; SUBCUTANEOUS at 11:36

## 2025-04-28 RX ADMIN — Medication 2 TABLET(S): at 20:55

## 2025-04-28 RX ADMIN — Medication 2 CAPSULE(S): at 18:06

## 2025-04-28 RX ADMIN — Medication 500 MILLIGRAM(S): at 11:36

## 2025-04-29 ENCOUNTER — TRANSCRIPTION ENCOUNTER (OUTPATIENT)
Age: 80
End: 2025-04-29

## 2025-04-29 PROCEDURE — 99233 SBSQ HOSP IP/OBS HIGH 50: CPT | Mod: GC

## 2025-04-29 PROCEDURE — 99232 SBSQ HOSP IP/OBS MODERATE 35: CPT

## 2025-04-29 RX ORDER — SENNA 187 MG
2 TABLET ORAL
Qty: 0 | Refills: 0 | DISCHARGE
Start: 2025-04-29

## 2025-04-29 RX ORDER — OXYCODONE HYDROCHLORIDE 30 MG/1
5 TABLET ORAL
Refills: 0 | Status: DISCONTINUED | OUTPATIENT
Start: 2025-04-29 | End: 2025-04-29

## 2025-04-29 RX ORDER — ACETAMINOPHEN 500 MG/5ML
2 LIQUID (ML) ORAL
Qty: 0 | Refills: 0 | DISCHARGE
Start: 2025-04-29

## 2025-04-29 RX ORDER — POLYETHYLENE GLYCOL 3350 17 G/17G
17 POWDER, FOR SOLUTION ORAL
Qty: 0 | Refills: 0 | DISCHARGE
Start: 2025-04-29

## 2025-04-29 RX ORDER — FERROUS SULFATE 137(45) MG
1 TABLET, EXTENDED RELEASE ORAL
Qty: 0 | Refills: 0 | DISCHARGE
Start: 2025-04-29

## 2025-04-29 RX ORDER — ATORVASTATIN CALCIUM 80 MG/1
1 TABLET, FILM COATED ORAL
Qty: 0 | Refills: 0 | DISCHARGE
Start: 2025-04-29

## 2025-04-29 RX ORDER — LACTULOSE 10 G/15ML
30 SOLUTION ORAL
Qty: 0 | Refills: 0 | DISCHARGE
Start: 2025-04-29

## 2025-04-29 RX ORDER — ASPIRIN 325 MG
1 TABLET ORAL
Qty: 0 | Refills: 0 | DISCHARGE
Start: 2025-04-29

## 2025-04-29 RX ORDER — OXYCODONE HYDROCHLORIDE 30 MG/1
10 TABLET ORAL
Refills: 0 | Status: DISCONTINUED | OUTPATIENT
Start: 2025-04-29 | End: 2025-05-02

## 2025-04-29 RX ORDER — LIPASE/PROTEASE/AMYLASE 10K-37.5K
2 CAPSULE,DELAYED RELEASE (ENTERIC COATED) ORAL
Qty: 0 | Refills: 0 | DISCHARGE
Start: 2025-04-29

## 2025-04-29 RX ORDER — CYANOCOBALAMIN 1000 UG/ML
1 INJECTION INTRAMUSCULAR; SUBCUTANEOUS
Qty: 0 | Refills: 0 | DISCHARGE
Start: 2025-04-29

## 2025-04-29 RX ADMIN — Medication 81 MILLIGRAM(S): at 12:22

## 2025-04-29 RX ADMIN — OXYCODONE HYDROCHLORIDE 10 MILLIGRAM(S): 30 TABLET ORAL at 08:11

## 2025-04-29 RX ADMIN — Medication 5 MILLIGRAM(S): at 19:50

## 2025-04-29 RX ADMIN — POLYETHYLENE GLYCOL 3350 17 GRAM(S): 17 POWDER, FOR SOLUTION ORAL at 17:30

## 2025-04-29 RX ADMIN — Medication 2 TABLET(S): at 19:50

## 2025-04-29 RX ADMIN — Medication 500 MILLIGRAM(S): at 12:22

## 2025-04-29 RX ADMIN — Medication 2 CAPSULE(S): at 17:30

## 2025-04-29 RX ADMIN — Medication 2 CAPSULE(S): at 12:22

## 2025-04-29 RX ADMIN — Medication 2 CAPSULE(S): at 08:10

## 2025-04-29 RX ADMIN — ENOXAPARIN SODIUM 40 MILLIGRAM(S): 100 INJECTION SUBCUTANEOUS at 05:23

## 2025-04-29 RX ADMIN — Medication 1000 UNIT(S): at 12:21

## 2025-04-29 RX ADMIN — ATORVASTATIN CALCIUM 20 MILLIGRAM(S): 80 TABLET, FILM COATED ORAL at 19:50

## 2025-04-29 RX ADMIN — Medication 325 MILLIGRAM(S): at 12:21

## 2025-04-29 RX ADMIN — LACTULOSE 20 GRAM(S): 10 SOLUTION ORAL at 17:30

## 2025-04-29 RX ADMIN — CYANOCOBALAMIN 1000 MICROGRAM(S): 1000 INJECTION INTRAMUSCULAR; SUBCUTANEOUS at 12:22

## 2025-04-30 LAB — SARS-COV-2 RNA SPEC QL NAA+PROBE: SIGNIFICANT CHANGE UP

## 2025-04-30 PROCEDURE — 99232 SBSQ HOSP IP/OBS MODERATE 35: CPT | Mod: GC

## 2025-04-30 RX ADMIN — ENOXAPARIN SODIUM 40 MILLIGRAM(S): 100 INJECTION SUBCUTANEOUS at 06:34

## 2025-04-30 RX ADMIN — Medication 2 CAPSULE(S): at 16:29

## 2025-04-30 RX ADMIN — ATORVASTATIN CALCIUM 20 MILLIGRAM(S): 80 TABLET, FILM COATED ORAL at 21:44

## 2025-04-30 RX ADMIN — Medication 500 MILLIGRAM(S): at 11:42

## 2025-04-30 RX ADMIN — Medication 2 TABLET(S): at 21:44

## 2025-04-30 RX ADMIN — Medication 2 CAPSULE(S): at 11:42

## 2025-04-30 RX ADMIN — DEXTROMETHORPHAN HBR, GUAIFENESIN 1200 MILLIGRAM(S): 200 LIQUID ORAL at 21:44

## 2025-04-30 RX ADMIN — LACTULOSE 20 GRAM(S): 10 SOLUTION ORAL at 16:29

## 2025-04-30 RX ADMIN — CYANOCOBALAMIN 1000 MICROGRAM(S): 1000 INJECTION INTRAMUSCULAR; SUBCUTANEOUS at 11:42

## 2025-04-30 RX ADMIN — POLYETHYLENE GLYCOL 3350 17 GRAM(S): 17 POWDER, FOR SOLUTION ORAL at 16:29

## 2025-04-30 RX ADMIN — Medication 81 MILLIGRAM(S): at 11:42

## 2025-04-30 RX ADMIN — Medication 1000 UNIT(S): at 11:42

## 2025-04-30 RX ADMIN — OXYCODONE HYDROCHLORIDE 10 MILLIGRAM(S): 30 TABLET ORAL at 11:41

## 2025-04-30 RX ADMIN — Medication 5 MILLIGRAM(S): at 21:44

## 2025-04-30 RX ADMIN — OXYCODONE HYDROCHLORIDE 10 MILLIGRAM(S): 30 TABLET ORAL at 12:41

## 2025-04-30 RX ADMIN — Medication 2 CAPSULE(S): at 09:37

## 2025-04-30 RX ADMIN — Medication 325 MILLIGRAM(S): at 11:42

## 2025-05-01 LAB
ALBUMIN SERPL ELPH-MCNC: 1.9 G/DL — LOW (ref 3.3–5)
ALP SERPL-CCNC: 137 U/L — HIGH (ref 40–120)
ALT FLD-CCNC: 19 U/L — SIGNIFICANT CHANGE UP (ref 10–45)
ANION GAP SERPL CALC-SCNC: 6 MMOL/L — SIGNIFICANT CHANGE UP (ref 5–17)
AST SERPL-CCNC: 26 U/L — SIGNIFICANT CHANGE UP (ref 10–40)
BASOPHILS # BLD AUTO: 0.04 K/UL — SIGNIFICANT CHANGE UP (ref 0–0.2)
BASOPHILS NFR BLD AUTO: 0.5 % — SIGNIFICANT CHANGE UP (ref 0–2)
BILIRUB SERPL-MCNC: 1.6 MG/DL — HIGH (ref 0.2–1.2)
BUN SERPL-MCNC: 25 MG/DL — HIGH (ref 7–23)
CALCIUM SERPL-MCNC: 8.3 MG/DL — LOW (ref 8.4–10.5)
CHLORIDE SERPL-SCNC: 102 MMOL/L — SIGNIFICANT CHANGE UP (ref 96–108)
CO2 SERPL-SCNC: 27 MMOL/L — SIGNIFICANT CHANGE UP (ref 22–31)
CREAT SERPL-MCNC: 0.85 MG/DL — SIGNIFICANT CHANGE UP (ref 0.5–1.3)
EGFR: 87 ML/MIN/1.73M2 — SIGNIFICANT CHANGE UP
EGFR: 87 ML/MIN/1.73M2 — SIGNIFICANT CHANGE UP
EOSINOPHIL # BLD AUTO: 0.13 K/UL — SIGNIFICANT CHANGE UP (ref 0–0.5)
EOSINOPHIL NFR BLD AUTO: 1.5 % — SIGNIFICANT CHANGE UP (ref 0–6)
GLUCOSE SERPL-MCNC: 103 MG/DL — HIGH (ref 70–99)
HCT VFR BLD CALC: 27 % — LOW (ref 39–50)
HGB BLD-MCNC: 8.6 G/DL — LOW (ref 13–17)
IMM GRANULOCYTES NFR BLD AUTO: 0.6 % — SIGNIFICANT CHANGE UP (ref 0–0.9)
LYMPHOCYTES # BLD AUTO: 0.6 K/UL — LOW (ref 1–3.3)
LYMPHOCYTES # BLD AUTO: 6.8 % — LOW (ref 13–44)
MCHC RBC-ENTMCNC: 28.8 PG — SIGNIFICANT CHANGE UP (ref 27–34)
MCHC RBC-ENTMCNC: 31.9 G/DL — LOW (ref 32–36)
MCV RBC AUTO: 90.3 FL — SIGNIFICANT CHANGE UP (ref 80–100)
MONOCYTES # BLD AUTO: 0.69 K/UL — SIGNIFICANT CHANGE UP (ref 0–0.9)
MONOCYTES NFR BLD AUTO: 7.8 % — SIGNIFICANT CHANGE UP (ref 2–14)
NEUTROPHILS # BLD AUTO: 7.3 K/UL — SIGNIFICANT CHANGE UP (ref 1.8–7.4)
NEUTROPHILS NFR BLD AUTO: 82.8 % — HIGH (ref 43–77)
NRBC BLD AUTO-RTO: 0 /100 WBCS — SIGNIFICANT CHANGE UP (ref 0–0)
PLATELET # BLD AUTO: 403 K/UL — HIGH (ref 150–400)
POTASSIUM SERPL-MCNC: 4.2 MMOL/L — SIGNIFICANT CHANGE UP (ref 3.5–5.3)
POTASSIUM SERPL-SCNC: 4.2 MMOL/L — SIGNIFICANT CHANGE UP (ref 3.5–5.3)
PROT SERPL-MCNC: 5.2 G/DL — LOW (ref 6–8.3)
RBC # BLD: 2.99 M/UL — LOW (ref 4.2–5.8)
RBC # FLD: 16.3 % — HIGH (ref 10.3–14.5)
SODIUM SERPL-SCNC: 135 MMOL/L — SIGNIFICANT CHANGE UP (ref 135–145)
WBC # BLD: 8.81 K/UL — SIGNIFICANT CHANGE UP (ref 3.8–10.5)
WBC # FLD AUTO: 8.81 K/UL — SIGNIFICANT CHANGE UP (ref 3.8–10.5)

## 2025-05-01 PROCEDURE — 99232 SBSQ HOSP IP/OBS MODERATE 35: CPT | Mod: GC

## 2025-05-01 PROCEDURE — 99232 SBSQ HOSP IP/OBS MODERATE 35: CPT

## 2025-05-01 RX ORDER — LACTULOSE 10 G/15ML
20 SOLUTION ORAL
Refills: 0 | Status: DISCONTINUED | OUTPATIENT
Start: 2025-05-01 | End: 2025-05-10

## 2025-05-01 RX ADMIN — POLYETHYLENE GLYCOL 3350 17 GRAM(S): 17 POWDER, FOR SOLUTION ORAL at 06:13

## 2025-05-01 RX ADMIN — Medication 325 MILLIGRAM(S): at 11:31

## 2025-05-01 RX ADMIN — LACTULOSE 20 GRAM(S): 10 SOLUTION ORAL at 16:22

## 2025-05-01 RX ADMIN — CYANOCOBALAMIN 1000 MICROGRAM(S): 1000 INJECTION INTRAMUSCULAR; SUBCUTANEOUS at 11:31

## 2025-05-01 RX ADMIN — Medication 2 CAPSULE(S): at 11:30

## 2025-05-01 RX ADMIN — POLYETHYLENE GLYCOL 3350 17 GRAM(S): 17 POWDER, FOR SOLUTION ORAL at 16:22

## 2025-05-01 RX ADMIN — Medication 81 MILLIGRAM(S): at 11:31

## 2025-05-01 RX ADMIN — OXYCODONE HYDROCHLORIDE 10 MILLIGRAM(S): 30 TABLET ORAL at 12:31

## 2025-05-01 RX ADMIN — DEXTROMETHORPHAN HBR, GUAIFENESIN 1200 MILLIGRAM(S): 200 LIQUID ORAL at 21:14

## 2025-05-01 RX ADMIN — Medication 2 CAPSULE(S): at 16:22

## 2025-05-01 RX ADMIN — ENOXAPARIN SODIUM 40 MILLIGRAM(S): 100 INJECTION SUBCUTANEOUS at 06:13

## 2025-05-01 RX ADMIN — OXYCODONE HYDROCHLORIDE 10 MILLIGRAM(S): 30 TABLET ORAL at 11:31

## 2025-05-01 RX ADMIN — ATORVASTATIN CALCIUM 20 MILLIGRAM(S): 80 TABLET, FILM COATED ORAL at 21:14

## 2025-05-01 RX ADMIN — Medication 500 MILLIGRAM(S): at 11:31

## 2025-05-01 RX ADMIN — Medication 1000 UNIT(S): at 11:31

## 2025-05-01 RX ADMIN — Medication 2 CAPSULE(S): at 08:18

## 2025-05-02 LAB
ALBUMIN SERPL ELPH-MCNC: 1.9 G/DL — LOW (ref 3.3–5)
ALP SERPL-CCNC: 145 U/L — HIGH (ref 40–120)
ALT FLD-CCNC: 22 U/L — SIGNIFICANT CHANGE UP (ref 10–45)
AST SERPL-CCNC: 24 U/L — SIGNIFICANT CHANGE UP (ref 10–40)
BILIRUB DIRECT SERPL-MCNC: 0.6 MG/DL — HIGH (ref 0–0.3)
BILIRUB INDIRECT FLD-MCNC: 1.1 MG/DL — HIGH (ref 0.2–1)
BILIRUB SERPL-MCNC: 1.7 MG/DL — HIGH (ref 0.2–1.2)
PROT SERPL-MCNC: 5.1 G/DL — LOW (ref 6–8.3)

## 2025-05-02 PROCEDURE — 99232 SBSQ HOSP IP/OBS MODERATE 35: CPT | Mod: GC

## 2025-05-02 PROCEDURE — 99232 SBSQ HOSP IP/OBS MODERATE 35: CPT

## 2025-05-02 RX ORDER — BISACODYL 5 MG
10 TABLET, DELAYED RELEASE (ENTERIC COATED) ORAL
Refills: 0 | Status: COMPLETED | OUTPATIENT
Start: 2025-05-02 | End: 2025-05-02

## 2025-05-02 RX ADMIN — CYANOCOBALAMIN 1000 MICROGRAM(S): 1000 INJECTION INTRAMUSCULAR; SUBCUTANEOUS at 12:18

## 2025-05-02 RX ADMIN — Medication 2 CAPSULE(S): at 08:02

## 2025-05-02 RX ADMIN — Medication 2 CAPSULE(S): at 12:18

## 2025-05-02 RX ADMIN — OXYCODONE HYDROCHLORIDE 10 MILLIGRAM(S): 30 TABLET ORAL at 09:48

## 2025-05-02 RX ADMIN — ATORVASTATIN CALCIUM 20 MILLIGRAM(S): 80 TABLET, FILM COATED ORAL at 20:25

## 2025-05-02 RX ADMIN — LACTULOSE 20 GRAM(S): 10 SOLUTION ORAL at 18:19

## 2025-05-02 RX ADMIN — Medication 325 MILLIGRAM(S): at 12:17

## 2025-05-02 RX ADMIN — Medication 2 CAPSULE(S): at 18:20

## 2025-05-02 RX ADMIN — Medication 1000 UNIT(S): at 12:17

## 2025-05-02 RX ADMIN — OXYCODONE HYDROCHLORIDE 10 MILLIGRAM(S): 30 TABLET ORAL at 14:20

## 2025-05-02 RX ADMIN — POLYETHYLENE GLYCOL 3350 17 GRAM(S): 17 POWDER, FOR SOLUTION ORAL at 18:19

## 2025-05-02 RX ADMIN — Medication 500 MILLIGRAM(S): at 12:18

## 2025-05-02 RX ADMIN — OXYCODONE HYDROCHLORIDE 10 MILLIGRAM(S): 30 TABLET ORAL at 13:27

## 2025-05-02 RX ADMIN — ENOXAPARIN SODIUM 40 MILLIGRAM(S): 100 INJECTION SUBCUTANEOUS at 06:32

## 2025-05-02 RX ADMIN — OXYCODONE HYDROCHLORIDE 10 MILLIGRAM(S): 30 TABLET ORAL at 10:48

## 2025-05-02 RX ADMIN — Medication 81 MILLIGRAM(S): at 12:18

## 2025-05-03 PROCEDURE — 99232 SBSQ HOSP IP/OBS MODERATE 35: CPT

## 2025-05-03 RX ADMIN — Medication 2 CAPSULE(S): at 07:51

## 2025-05-03 RX ADMIN — Medication 500 MILLIGRAM(S): at 12:41

## 2025-05-03 RX ADMIN — ENOXAPARIN SODIUM 40 MILLIGRAM(S): 100 INJECTION SUBCUTANEOUS at 05:50

## 2025-05-03 RX ADMIN — Medication 325 MILLIGRAM(S): at 12:40

## 2025-05-03 RX ADMIN — CYANOCOBALAMIN 1000 MICROGRAM(S): 1000 INJECTION INTRAMUSCULAR; SUBCUTANEOUS at 12:40

## 2025-05-03 RX ADMIN — Medication 2 CAPSULE(S): at 18:13

## 2025-05-03 RX ADMIN — Medication 2 CAPSULE(S): at 12:39

## 2025-05-03 RX ADMIN — Medication 81 MILLIGRAM(S): at 12:39

## 2025-05-03 RX ADMIN — Medication 1000 UNIT(S): at 12:39

## 2025-05-03 RX ADMIN — ATORVASTATIN CALCIUM 20 MILLIGRAM(S): 80 TABLET, FILM COATED ORAL at 21:41

## 2025-05-04 PROCEDURE — 99232 SBSQ HOSP IP/OBS MODERATE 35: CPT

## 2025-05-04 RX ADMIN — ENOXAPARIN SODIUM 40 MILLIGRAM(S): 100 INJECTION SUBCUTANEOUS at 05:48

## 2025-05-04 RX ADMIN — Medication 2 CAPSULE(S): at 17:11

## 2025-05-04 RX ADMIN — ATORVASTATIN CALCIUM 20 MILLIGRAM(S): 80 TABLET, FILM COATED ORAL at 20:45

## 2025-05-04 RX ADMIN — CYANOCOBALAMIN 1000 MICROGRAM(S): 1000 INJECTION INTRAMUSCULAR; SUBCUTANEOUS at 12:01

## 2025-05-04 RX ADMIN — Medication 81 MILLIGRAM(S): at 12:01

## 2025-05-04 RX ADMIN — Medication 325 MILLIGRAM(S): at 12:01

## 2025-05-04 RX ADMIN — Medication 2 CAPSULE(S): at 12:01

## 2025-05-04 RX ADMIN — Medication 2 CAPSULE(S): at 07:48

## 2025-05-04 RX ADMIN — Medication 500 MILLIGRAM(S): at 12:01

## 2025-05-04 RX ADMIN — Medication 1000 UNIT(S): at 12:00

## 2025-05-05 LAB
ALBUMIN SERPL ELPH-MCNC: 1.8 G/DL — LOW (ref 3.3–5)
ALP SERPL-CCNC: 182 U/L — HIGH (ref 40–120)
ALT FLD-CCNC: 21 U/L — SIGNIFICANT CHANGE UP (ref 10–45)
ANION GAP SERPL CALC-SCNC: 3 MMOL/L — LOW (ref 5–17)
AST SERPL-CCNC: 24 U/L — SIGNIFICANT CHANGE UP (ref 10–40)
BASOPHILS # BLD AUTO: 0.04 K/UL — SIGNIFICANT CHANGE UP (ref 0–0.2)
BASOPHILS NFR BLD AUTO: 0.6 % — SIGNIFICANT CHANGE UP (ref 0–2)
BILIRUB SERPL-MCNC: 1.2 MG/DL — SIGNIFICANT CHANGE UP (ref 0.2–1.2)
BUN SERPL-MCNC: 19 MG/DL — SIGNIFICANT CHANGE UP (ref 7–23)
CALCIUM SERPL-MCNC: 8 MG/DL — LOW (ref 8.4–10.5)
CHLORIDE SERPL-SCNC: 104 MMOL/L — SIGNIFICANT CHANGE UP (ref 96–108)
CO2 SERPL-SCNC: 30 MMOL/L — SIGNIFICANT CHANGE UP (ref 22–31)
CREAT SERPL-MCNC: 0.83 MG/DL — SIGNIFICANT CHANGE UP (ref 0.5–1.3)
EGFR: 88 ML/MIN/1.73M2 — SIGNIFICANT CHANGE UP
EGFR: 88 ML/MIN/1.73M2 — SIGNIFICANT CHANGE UP
EOSINOPHIL # BLD AUTO: 0.14 K/UL — SIGNIFICANT CHANGE UP (ref 0–0.5)
EOSINOPHIL NFR BLD AUTO: 2.2 % — SIGNIFICANT CHANGE UP (ref 0–6)
GLUCOSE SERPL-MCNC: 88 MG/DL — SIGNIFICANT CHANGE UP (ref 70–99)
HCT VFR BLD CALC: 26.4 % — LOW (ref 39–50)
HGB BLD-MCNC: 8.5 G/DL — LOW (ref 13–17)
IMM GRANULOCYTES NFR BLD AUTO: 0.5 % — SIGNIFICANT CHANGE UP (ref 0–0.9)
LYMPHOCYTES # BLD AUTO: 0.57 K/UL — LOW (ref 1–3.3)
LYMPHOCYTES # BLD AUTO: 9 % — LOW (ref 13–44)
MCHC RBC-ENTMCNC: 29.6 PG — SIGNIFICANT CHANGE UP (ref 27–34)
MCHC RBC-ENTMCNC: 32.2 G/DL — SIGNIFICANT CHANGE UP (ref 32–36)
MCV RBC AUTO: 92 FL — SIGNIFICANT CHANGE UP (ref 80–100)
MONOCYTES # BLD AUTO: 0.65 K/UL — SIGNIFICANT CHANGE UP (ref 0–0.9)
MONOCYTES NFR BLD AUTO: 10.3 % — SIGNIFICANT CHANGE UP (ref 2–14)
NEUTROPHILS # BLD AUTO: 4.91 K/UL — SIGNIFICANT CHANGE UP (ref 1.8–7.4)
NEUTROPHILS NFR BLD AUTO: 77.4 % — HIGH (ref 43–77)
NRBC BLD AUTO-RTO: 0 /100 WBCS — SIGNIFICANT CHANGE UP (ref 0–0)
PLATELET # BLD AUTO: 375 K/UL — SIGNIFICANT CHANGE UP (ref 150–400)
POTASSIUM SERPL-MCNC: 3.9 MMOL/L — SIGNIFICANT CHANGE UP (ref 3.5–5.3)
POTASSIUM SERPL-SCNC: 3.9 MMOL/L — SIGNIFICANT CHANGE UP (ref 3.5–5.3)
PROT SERPL-MCNC: 4.8 G/DL — LOW (ref 6–8.3)
RBC # BLD: 2.87 M/UL — LOW (ref 4.2–5.8)
RBC # FLD: 18.3 % — HIGH (ref 10.3–14.5)
SARS-COV-2 RNA SPEC QL NAA+PROBE: SIGNIFICANT CHANGE UP
SODIUM SERPL-SCNC: 137 MMOL/L — SIGNIFICANT CHANGE UP (ref 135–145)
WBC # BLD: 6.34 K/UL — SIGNIFICANT CHANGE UP (ref 3.8–10.5)
WBC # FLD AUTO: 6.34 K/UL — SIGNIFICANT CHANGE UP (ref 3.8–10.5)

## 2025-05-05 PROCEDURE — 99232 SBSQ HOSP IP/OBS MODERATE 35: CPT

## 2025-05-05 PROCEDURE — 99232 SBSQ HOSP IP/OBS MODERATE 35: CPT | Mod: GC

## 2025-05-05 RX ORDER — FERROUS SULFATE 137(45) MG
1 TABLET, EXTENDED RELEASE ORAL
Qty: 30 | Refills: 0
Start: 2025-05-05 | End: 2025-06-03

## 2025-05-05 RX ORDER — LACTULOSE 10 G/15ML
30 SOLUTION ORAL
Qty: 1800 | Refills: 0
Start: 2025-05-05 | End: 2025-06-03

## 2025-05-05 RX ORDER — LIPASE/PROTEASE/AMYLASE 10K-37.5K
2 CAPSULE,DELAYED RELEASE (ENTERIC COATED) ORAL
Qty: 180 | Refills: 0
Start: 2025-05-05 | End: 2025-06-03

## 2025-05-05 RX ORDER — CYANOCOBALAMIN 1000 UG/ML
1 INJECTION INTRAMUSCULAR; SUBCUTANEOUS
Qty: 30 | Refills: 0
Start: 2025-05-05 | End: 2025-06-03

## 2025-05-05 RX ORDER — ASPIRIN 325 MG
1 TABLET ORAL
Qty: 30 | Refills: 0
Start: 2025-05-05 | End: 2025-06-03

## 2025-05-05 RX ORDER — ATORVASTATIN CALCIUM 80 MG/1
1 TABLET, FILM COATED ORAL
Qty: 30 | Refills: 0
Start: 2025-05-05 | End: 2025-06-03

## 2025-05-05 RX ORDER — MECLIZINE HCL 12.5 MG
1 TABLET ORAL
Qty: 21 | Refills: 0
Start: 2025-05-05 | End: 2025-05-11

## 2025-05-05 RX ORDER — OXYCODONE HYDROCHLORIDE 30 MG/1
1 TABLET ORAL
Qty: 28 | Refills: 0
Start: 2025-05-05 | End: 2025-05-11

## 2025-05-05 RX ADMIN — Medication 2 CAPSULE(S): at 16:03

## 2025-05-05 RX ADMIN — Medication 325 MILLIGRAM(S): at 12:43

## 2025-05-05 RX ADMIN — Medication 2 CAPSULE(S): at 12:42

## 2025-05-05 RX ADMIN — LACTULOSE 20 GRAM(S): 10 SOLUTION ORAL at 16:03

## 2025-05-05 RX ADMIN — Medication 1000 UNIT(S): at 12:43

## 2025-05-05 RX ADMIN — Medication 500 MILLIGRAM(S): at 12:43

## 2025-05-05 RX ADMIN — Medication 2 CAPSULE(S): at 08:21

## 2025-05-05 RX ADMIN — Medication 650 MILLIGRAM(S): at 12:42

## 2025-05-05 RX ADMIN — CYANOCOBALAMIN 1000 MICROGRAM(S): 1000 INJECTION INTRAMUSCULAR; SUBCUTANEOUS at 12:43

## 2025-05-05 RX ADMIN — Medication 81 MILLIGRAM(S): at 12:43

## 2025-05-05 RX ADMIN — ENOXAPARIN SODIUM 40 MILLIGRAM(S): 100 INJECTION SUBCUTANEOUS at 05:09

## 2025-05-05 RX ADMIN — ATORVASTATIN CALCIUM 20 MILLIGRAM(S): 80 TABLET, FILM COATED ORAL at 21:02

## 2025-05-06 PROCEDURE — 99232 SBSQ HOSP IP/OBS MODERATE 35: CPT

## 2025-05-06 PROCEDURE — 99233 SBSQ HOSP IP/OBS HIGH 50: CPT | Mod: GC

## 2025-05-06 RX ADMIN — Medication 500 MILLIGRAM(S): at 11:38

## 2025-05-06 RX ADMIN — Medication 650 MILLIGRAM(S): at 13:42

## 2025-05-06 RX ADMIN — Medication 81 MILLIGRAM(S): at 11:37

## 2025-05-06 RX ADMIN — Medication 2 CAPSULE(S): at 08:05

## 2025-05-06 RX ADMIN — Medication 2 CAPSULE(S): at 17:10

## 2025-05-06 RX ADMIN — Medication 2 CAPSULE(S): at 11:37

## 2025-05-06 RX ADMIN — Medication 1000 UNIT(S): at 11:38

## 2025-05-06 RX ADMIN — ATORVASTATIN CALCIUM 20 MILLIGRAM(S): 80 TABLET, FILM COATED ORAL at 21:31

## 2025-05-06 RX ADMIN — Medication 325 MILLIGRAM(S): at 11:39

## 2025-05-06 RX ADMIN — ENOXAPARIN SODIUM 40 MILLIGRAM(S): 100 INJECTION SUBCUTANEOUS at 05:10

## 2025-05-06 RX ADMIN — CYANOCOBALAMIN 1000 MICROGRAM(S): 1000 INJECTION INTRAMUSCULAR; SUBCUTANEOUS at 11:38

## 2025-05-07 PROCEDURE — 99232 SBSQ HOSP IP/OBS MODERATE 35: CPT

## 2025-05-07 PROCEDURE — 99232 SBSQ HOSP IP/OBS MODERATE 35: CPT | Mod: GC

## 2025-05-07 RX ADMIN — ENOXAPARIN SODIUM 40 MILLIGRAM(S): 100 INJECTION SUBCUTANEOUS at 06:07

## 2025-05-07 RX ADMIN — Medication 1000 UNIT(S): at 11:44

## 2025-05-07 RX ADMIN — Medication 650 MILLIGRAM(S): at 08:29

## 2025-05-07 RX ADMIN — Medication 5 MILLIGRAM(S): at 19:30

## 2025-05-07 RX ADMIN — Medication 2 CAPSULE(S): at 17:50

## 2025-05-07 RX ADMIN — ATORVASTATIN CALCIUM 20 MILLIGRAM(S): 80 TABLET, FILM COATED ORAL at 19:30

## 2025-05-07 RX ADMIN — Medication 325 MILLIGRAM(S): at 11:44

## 2025-05-07 RX ADMIN — CYANOCOBALAMIN 1000 MICROGRAM(S): 1000 INJECTION INTRAMUSCULAR; SUBCUTANEOUS at 11:44

## 2025-05-07 RX ADMIN — Medication 81 MILLIGRAM(S): at 11:43

## 2025-05-07 RX ADMIN — Medication 500 MILLIGRAM(S): at 11:44

## 2025-05-07 RX ADMIN — Medication 650 MILLIGRAM(S): at 09:29

## 2025-05-07 RX ADMIN — Medication 2 CAPSULE(S): at 11:43

## 2025-05-07 RX ADMIN — Medication 2 TABLET(S): at 19:30

## 2025-05-07 RX ADMIN — LACTULOSE 20 GRAM(S): 10 SOLUTION ORAL at 19:31

## 2025-05-07 RX ADMIN — Medication 2 CAPSULE(S): at 08:27

## 2025-05-08 ENCOUNTER — TRANSCRIPTION ENCOUNTER (OUTPATIENT)
Age: 80
End: 2025-05-08

## 2025-05-08 LAB
ALBUMIN SERPL ELPH-MCNC: 2 G/DL — LOW (ref 3.3–5)
ALP SERPL-CCNC: 255 U/L — HIGH (ref 40–120)
ALT FLD-CCNC: 30 U/L — SIGNIFICANT CHANGE UP (ref 10–45)
ANION GAP SERPL CALC-SCNC: 7 MMOL/L — SIGNIFICANT CHANGE UP (ref 5–17)
AST SERPL-CCNC: 29 U/L — SIGNIFICANT CHANGE UP (ref 10–40)
BASOPHILS # BLD AUTO: 0.07 K/UL — SIGNIFICANT CHANGE UP (ref 0–0.2)
BASOPHILS NFR BLD AUTO: 1.4 % — SIGNIFICANT CHANGE UP (ref 0–2)
BILIRUB SERPL-MCNC: 0.8 MG/DL — SIGNIFICANT CHANGE UP (ref 0.2–1.2)
BUN SERPL-MCNC: 19 MG/DL — SIGNIFICANT CHANGE UP (ref 7–23)
CALCIUM SERPL-MCNC: 8.3 MG/DL — LOW (ref 8.4–10.5)
CHLORIDE SERPL-SCNC: 104 MMOL/L — SIGNIFICANT CHANGE UP (ref 96–108)
CO2 SERPL-SCNC: 29 MMOL/L — SIGNIFICANT CHANGE UP (ref 22–31)
CREAT SERPL-MCNC: 0.66 MG/DL — SIGNIFICANT CHANGE UP (ref 0.5–1.3)
EGFR: 94 ML/MIN/1.73M2 — SIGNIFICANT CHANGE UP
EGFR: 94 ML/MIN/1.73M2 — SIGNIFICANT CHANGE UP
EOSINOPHIL # BLD AUTO: 0.2 K/UL — SIGNIFICANT CHANGE UP (ref 0–0.5)
EOSINOPHIL NFR BLD AUTO: 3.9 % — SIGNIFICANT CHANGE UP (ref 0–6)
GLUCOSE SERPL-MCNC: 85 MG/DL — SIGNIFICANT CHANGE UP (ref 70–99)
HCT VFR BLD CALC: 29.2 % — LOW (ref 39–50)
HGB BLD-MCNC: 9 G/DL — LOW (ref 13–17)
IMM GRANULOCYTES NFR BLD AUTO: 0.4 % — SIGNIFICANT CHANGE UP (ref 0–0.9)
LYMPHOCYTES # BLD AUTO: 0.73 K/UL — LOW (ref 1–3.3)
LYMPHOCYTES # BLD AUTO: 14.1 % — SIGNIFICANT CHANGE UP (ref 13–44)
MCHC RBC-ENTMCNC: 29.2 PG — SIGNIFICANT CHANGE UP (ref 27–34)
MCHC RBC-ENTMCNC: 30.8 G/DL — LOW (ref 32–36)
MCV RBC AUTO: 94.8 FL — SIGNIFICANT CHANGE UP (ref 80–100)
MONOCYTES # BLD AUTO: 0.66 K/UL — SIGNIFICANT CHANGE UP (ref 0–0.9)
MONOCYTES NFR BLD AUTO: 12.7 % — SIGNIFICANT CHANGE UP (ref 2–14)
NEUTROPHILS # BLD AUTO: 3.5 K/UL — SIGNIFICANT CHANGE UP (ref 1.8–7.4)
NEUTROPHILS NFR BLD AUTO: 67.5 % — SIGNIFICANT CHANGE UP (ref 43–77)
NRBC BLD AUTO-RTO: 0 /100 WBCS — SIGNIFICANT CHANGE UP (ref 0–0)
PLATELET # BLD AUTO: 393 K/UL — SIGNIFICANT CHANGE UP (ref 150–400)
POTASSIUM SERPL-MCNC: 3.9 MMOL/L — SIGNIFICANT CHANGE UP (ref 3.5–5.3)
POTASSIUM SERPL-SCNC: 3.9 MMOL/L — SIGNIFICANT CHANGE UP (ref 3.5–5.3)
PROT SERPL-MCNC: 5.2 G/DL — LOW (ref 6–8.3)
RBC # BLD: 3.08 M/UL — LOW (ref 4.2–5.8)
RBC # FLD: 19.6 % — HIGH (ref 10.3–14.5)
SODIUM SERPL-SCNC: 140 MMOL/L — SIGNIFICANT CHANGE UP (ref 135–145)
WBC # BLD: 5.18 K/UL — SIGNIFICANT CHANGE UP (ref 3.8–10.5)
WBC # FLD AUTO: 5.18 K/UL — SIGNIFICANT CHANGE UP (ref 3.8–10.5)

## 2025-05-08 PROCEDURE — 99232 SBSQ HOSP IP/OBS MODERATE 35: CPT | Mod: GC

## 2025-05-08 PROCEDURE — 99232 SBSQ HOSP IP/OBS MODERATE 35: CPT

## 2025-05-08 RX ADMIN — Medication 81 MILLIGRAM(S): at 12:17

## 2025-05-08 RX ADMIN — Medication 2 CAPSULE(S): at 17:18

## 2025-05-08 RX ADMIN — ENOXAPARIN SODIUM 40 MILLIGRAM(S): 100 INJECTION SUBCUTANEOUS at 05:51

## 2025-05-08 RX ADMIN — Medication 1000 UNIT(S): at 12:17

## 2025-05-08 RX ADMIN — Medication 500 MILLIGRAM(S): at 12:17

## 2025-05-08 RX ADMIN — POLYETHYLENE GLYCOL 3350 17 GRAM(S): 17 POWDER, FOR SOLUTION ORAL at 05:51

## 2025-05-08 RX ADMIN — Medication 2 CAPSULE(S): at 09:27

## 2025-05-08 RX ADMIN — Medication 650 MILLIGRAM(S): at 09:27

## 2025-05-08 RX ADMIN — Medication 2 CAPSULE(S): at 12:17

## 2025-05-08 RX ADMIN — Medication 325 MILLIGRAM(S): at 12:18

## 2025-05-08 RX ADMIN — CYANOCOBALAMIN 1000 MICROGRAM(S): 1000 INJECTION INTRAMUSCULAR; SUBCUTANEOUS at 12:17

## 2025-05-08 RX ADMIN — ATORVASTATIN CALCIUM 20 MILLIGRAM(S): 80 TABLET, FILM COATED ORAL at 21:26

## 2025-05-08 RX ADMIN — Medication 650 MILLIGRAM(S): at 10:27

## 2025-05-09 PROCEDURE — 99232 SBSQ HOSP IP/OBS MODERATE 35: CPT

## 2025-05-09 PROCEDURE — 99232 SBSQ HOSP IP/OBS MODERATE 35: CPT | Mod: GC

## 2025-05-09 RX ADMIN — Medication 2 CAPSULE(S): at 11:30

## 2025-05-09 RX ADMIN — Medication 2 CAPSULE(S): at 16:48

## 2025-05-09 RX ADMIN — Medication 325 MILLIGRAM(S): at 11:26

## 2025-05-09 RX ADMIN — Medication 2 TABLET(S): at 21:06

## 2025-05-09 RX ADMIN — Medication 500 MILLIGRAM(S): at 11:26

## 2025-05-09 RX ADMIN — ATORVASTATIN CALCIUM 20 MILLIGRAM(S): 80 TABLET, FILM COATED ORAL at 21:06

## 2025-05-09 RX ADMIN — ENOXAPARIN SODIUM 40 MILLIGRAM(S): 100 INJECTION SUBCUTANEOUS at 05:34

## 2025-05-09 RX ADMIN — Medication 2 CAPSULE(S): at 07:34

## 2025-05-09 RX ADMIN — Medication 1000 UNIT(S): at 11:27

## 2025-05-09 RX ADMIN — Medication 650 MILLIGRAM(S): at 11:25

## 2025-05-09 RX ADMIN — Medication 81 MILLIGRAM(S): at 11:26

## 2025-05-09 RX ADMIN — CYANOCOBALAMIN 1000 MICROGRAM(S): 1000 INJECTION INTRAMUSCULAR; SUBCUTANEOUS at 11:27

## 2025-05-10 VITALS
SYSTOLIC BLOOD PRESSURE: 126 MMHG | RESPIRATION RATE: 16 BRPM | DIASTOLIC BLOOD PRESSURE: 69 MMHG | OXYGEN SATURATION: 99 % | HEART RATE: 68 BPM | TEMPERATURE: 98 F

## 2025-05-10 LAB
APPEARANCE UR: CLEAR — SIGNIFICANT CHANGE UP
BACTERIA # UR AUTO: NEGATIVE /HPF — SIGNIFICANT CHANGE UP
BILIRUB UR-MCNC: NEGATIVE — SIGNIFICANT CHANGE UP
COLOR SPEC: YELLOW — SIGNIFICANT CHANGE UP
DIFF PNL FLD: ABNORMAL
EPI CELLS # UR: SIGNIFICANT CHANGE UP
GLUCOSE UR QL: NEGATIVE MG/DL — SIGNIFICANT CHANGE UP
KETONES UR-MCNC: NEGATIVE MG/DL — SIGNIFICANT CHANGE UP
LEUKOCYTE ESTERASE UR-ACNC: NEGATIVE — SIGNIFICANT CHANGE UP
NITRITE UR-MCNC: NEGATIVE — SIGNIFICANT CHANGE UP
PH UR: 6.5 — SIGNIFICANT CHANGE UP (ref 5–8)
PROT UR-MCNC: NEGATIVE MG/DL — SIGNIFICANT CHANGE UP
RBC CASTS # UR COMP ASSIST: 4 /HPF — SIGNIFICANT CHANGE UP (ref 0–4)
SP GR SPEC: 1.01 — SIGNIFICANT CHANGE UP (ref 1–1.03)
UROBILINOGEN FLD QL: 4 MG/DL (ref 0.2–1)
WBC UR QL: 0 /HPF — SIGNIFICANT CHANGE UP (ref 0–5)

## 2025-05-10 PROCEDURE — 99238 HOSP IP/OBS DSCHRG MGMT 30/<: CPT

## 2025-05-10 RX ADMIN — Medication 81 MILLIGRAM(S): at 11:21

## 2025-05-10 RX ADMIN — CYANOCOBALAMIN 1000 MICROGRAM(S): 1000 INJECTION INTRAMUSCULAR; SUBCUTANEOUS at 11:21

## 2025-05-10 RX ADMIN — ENOXAPARIN SODIUM 40 MILLIGRAM(S): 100 INJECTION SUBCUTANEOUS at 06:21

## 2025-05-10 RX ADMIN — Medication 1000 UNIT(S): at 11:21

## 2025-05-10 RX ADMIN — Medication 500 MILLIGRAM(S): at 11:21

## 2025-05-10 RX ADMIN — Medication 2 CAPSULE(S): at 11:33

## 2025-05-10 RX ADMIN — Medication 325 MILLIGRAM(S): at 11:22

## 2025-05-10 RX ADMIN — Medication 2 CAPSULE(S): at 07:40

## 2025-05-14 ENCOUNTER — APPOINTMENT (OUTPATIENT)
Dept: PHYSICAL MEDICINE AND REHAB | Facility: CLINIC | Age: 80
End: 2025-05-14

## 2025-05-20 ENCOUNTER — APPOINTMENT (OUTPATIENT)
Dept: ORTHOPEDIC SURGERY | Facility: CLINIC | Age: 80
End: 2025-05-20

## 2025-05-20 DIAGNOSIS — M25.551 PAIN IN RIGHT HIP: ICD-10-CM

## 2025-05-20 DIAGNOSIS — S72.009A FRACTURE OF UNSPECIFIED PART OF NECK OF UNSPECIFIED FEMUR, INITIAL ENCOUNTER FOR CLOSED FRACTURE: ICD-10-CM

## 2025-05-20 DIAGNOSIS — M80.00XA AGE-RELATED OSTEOPOROSIS WITH CURRENT PATHOLOGICAL FRACTURE, UNSPECIFIED SITE, INITIAL ENCOUNTER FOR FRACTURE: ICD-10-CM

## 2025-05-20 PROCEDURE — 73502 X-RAY EXAM HIP UNI 2-3 VIEWS: CPT | Mod: RT

## 2025-05-20 PROCEDURE — 99024 POSTOP FOLLOW-UP VISIT: CPT

## 2025-05-20 PROCEDURE — 73552 X-RAY EXAM OF FEMUR 2/>: CPT | Mod: RT

## 2025-05-21 RX ORDER — FUROSEMIDE 20 MG/1
20 TABLET ORAL DAILY
Qty: 30 | Refills: 5 | Status: ACTIVE | COMMUNITY
Start: 2025-05-21 | End: 1900-01-01

## 2025-05-22 ENCOUNTER — NON-APPOINTMENT (OUTPATIENT)
Age: 80
End: 2025-05-22

## 2025-05-24 ENCOUNTER — INPATIENT (INPATIENT)
Facility: HOSPITAL | Age: 80
LOS: 4 days | Discharge: NURSING, REHAB OR LONG-TERM CARE | DRG: 948 | End: 2025-05-29
Attending: HOSPITALIST | Admitting: HOSPITALIST
Payer: MEDICARE

## 2025-05-24 VITALS
HEIGHT: 72 IN | TEMPERATURE: 97 F | RESPIRATION RATE: 18 BRPM | OXYGEN SATURATION: 99 % | HEART RATE: 74 BPM | SYSTOLIC BLOOD PRESSURE: 150 MMHG | WEIGHT: 153.88 LBS | DIASTOLIC BLOOD PRESSURE: 79 MMHG

## 2025-05-24 DIAGNOSIS — R60.0 LOCALIZED EDEMA: ICD-10-CM

## 2025-05-24 LAB
ALBUMIN SERPL ELPH-MCNC: 2.5 G/DL — LOW (ref 3.3–5)
ALP SERPL-CCNC: 296 U/L — HIGH (ref 40–120)
ALT FLD-CCNC: 21 U/L — SIGNIFICANT CHANGE UP (ref 12–78)
ANION GAP SERPL CALC-SCNC: 5 MMOL/L — SIGNIFICANT CHANGE UP (ref 5–17)
ANION GAP SERPL CALC-SCNC: 6 MMOL/L — SIGNIFICANT CHANGE UP (ref 5–17)
APTT BLD: 29.9 SEC — SIGNIFICANT CHANGE UP (ref 26.1–36.8)
AST SERPL-CCNC: 19 U/L — SIGNIFICANT CHANGE UP (ref 15–37)
BASOPHILS # BLD AUTO: 0.05 K/UL — SIGNIFICANT CHANGE UP (ref 0–0.2)
BASOPHILS NFR BLD AUTO: 0.8 % — SIGNIFICANT CHANGE UP (ref 0–2)
BILIRUB SERPL-MCNC: 0.5 MG/DL — SIGNIFICANT CHANGE UP (ref 0.2–1.2)
BUN SERPL-MCNC: 18 MG/DL — SIGNIFICANT CHANGE UP (ref 7–23)
BUN SERPL-MCNC: 19 MG/DL — SIGNIFICANT CHANGE UP (ref 7–23)
CALCIUM SERPL-MCNC: 8.8 MG/DL — SIGNIFICANT CHANGE UP (ref 8.5–10.1)
CALCIUM SERPL-MCNC: 8.8 MG/DL — SIGNIFICANT CHANGE UP (ref 8.5–10.1)
CHLORIDE SERPL-SCNC: 100 MMOL/L — SIGNIFICANT CHANGE UP (ref 96–108)
CHLORIDE SERPL-SCNC: 101 MMOL/L — SIGNIFICANT CHANGE UP (ref 96–108)
CO2 SERPL-SCNC: 29 MMOL/L — SIGNIFICANT CHANGE UP (ref 22–31)
CO2 SERPL-SCNC: 30 MMOL/L — SIGNIFICANT CHANGE UP (ref 22–31)
CREAT SERPL-MCNC: 0.74 MG/DL — SIGNIFICANT CHANGE UP (ref 0.5–1.3)
CREAT SERPL-MCNC: 0.75 MG/DL — SIGNIFICANT CHANGE UP (ref 0.5–1.3)
EGFR: 91 ML/MIN/1.73M2 — SIGNIFICANT CHANGE UP
EOSINOPHIL # BLD AUTO: 0.08 K/UL — SIGNIFICANT CHANGE UP (ref 0–0.5)
EOSINOPHIL NFR BLD AUTO: 1.3 % — SIGNIFICANT CHANGE UP (ref 0–6)
GLUCOSE SERPL-MCNC: 110 MG/DL — HIGH (ref 70–99)
GLUCOSE SERPL-MCNC: 137 MG/DL — HIGH (ref 70–99)
HCT VFR BLD CALC: 36.5 % — LOW (ref 39–50)
HGB BLD-MCNC: 11.6 G/DL — LOW (ref 13–17)
IMM GRANULOCYTES NFR BLD AUTO: 0.5 % — SIGNIFICANT CHANGE UP (ref 0–0.9)
INR BLD: 0.99 RATIO — SIGNIFICANT CHANGE UP (ref 0.85–1.16)
LYMPHOCYTES # BLD AUTO: 0.67 K/UL — LOW (ref 1–3.3)
LYMPHOCYTES # BLD AUTO: 10.5 % — LOW (ref 13–44)
MAGNESIUM SERPL-MCNC: 2 MG/DL — SIGNIFICANT CHANGE UP (ref 1.6–2.6)
MCHC RBC-ENTMCNC: 30.1 PG — SIGNIFICANT CHANGE UP (ref 27–34)
MCHC RBC-ENTMCNC: 31.8 G/DL — LOW (ref 32–36)
MCV RBC AUTO: 94.8 FL — SIGNIFICANT CHANGE UP (ref 80–100)
MONOCYTES # BLD AUTO: 0.66 K/UL — SIGNIFICANT CHANGE UP (ref 0–0.9)
MONOCYTES NFR BLD AUTO: 10.3 % — SIGNIFICANT CHANGE UP (ref 2–14)
NEUTROPHILS # BLD AUTO: 4.91 K/UL — SIGNIFICANT CHANGE UP (ref 1.8–7.4)
NEUTROPHILS NFR BLD AUTO: 76.6 % — SIGNIFICANT CHANGE UP (ref 43–77)
NRBC BLD AUTO-RTO: 0 /100 WBCS — SIGNIFICANT CHANGE UP (ref 0–0)
NT-PROBNP SERPL-SCNC: 215 PG/ML — SIGNIFICANT CHANGE UP (ref 0–450)
PLATELET # BLD AUTO: 289 K/UL — SIGNIFICANT CHANGE UP (ref 150–400)
POTASSIUM SERPL-MCNC: 3.6 MMOL/L — SIGNIFICANT CHANGE UP (ref 3.5–5.3)
POTASSIUM SERPL-MCNC: 4.3 MMOL/L — SIGNIFICANT CHANGE UP (ref 3.5–5.3)
POTASSIUM SERPL-SCNC: 3.6 MMOL/L — SIGNIFICANT CHANGE UP (ref 3.5–5.3)
POTASSIUM SERPL-SCNC: 4.3 MMOL/L — SIGNIFICANT CHANGE UP (ref 3.5–5.3)
PROT SERPL-MCNC: 6.1 G/DL — SIGNIFICANT CHANGE UP (ref 6–8.3)
PROTHROM AB SERPL-ACNC: 11.7 SEC — SIGNIFICANT CHANGE UP (ref 9.9–13.4)
RBC # BLD: 3.85 M/UL — LOW (ref 4.2–5.8)
RBC # FLD: 17.2 % — HIGH (ref 10.3–14.5)
SODIUM SERPL-SCNC: 135 MMOL/L — SIGNIFICANT CHANGE UP (ref 135–145)
SODIUM SERPL-SCNC: 136 MMOL/L — SIGNIFICANT CHANGE UP (ref 135–145)
WBC # BLD: 6.4 K/UL — SIGNIFICANT CHANGE UP (ref 3.8–10.5)
WBC # FLD AUTO: 6.4 K/UL — SIGNIFICANT CHANGE UP (ref 3.8–10.5)

## 2025-05-24 PROCEDURE — 93010 ELECTROCARDIOGRAM REPORT: CPT

## 2025-05-24 PROCEDURE — 93970 EXTREMITY STUDY: CPT | Mod: 26

## 2025-05-24 PROCEDURE — 99221 1ST HOSP IP/OBS SF/LOW 40: CPT

## 2025-05-24 PROCEDURE — 71275 CT ANGIOGRAPHY CHEST: CPT | Mod: 26

## 2025-05-24 PROCEDURE — 99285 EMERGENCY DEPT VISIT HI MDM: CPT | Mod: FS,GC

## 2025-05-24 PROCEDURE — 74177 CT ABD & PELVIS W/CONTRAST: CPT | Mod: 26

## 2025-05-24 PROCEDURE — 71045 X-RAY EXAM CHEST 1 VIEW: CPT | Mod: 26

## 2025-05-24 RX ORDER — ASPIRIN 325 MG
81 TABLET ORAL DAILY
Refills: 0 | Status: DISCONTINUED | OUTPATIENT
Start: 2025-05-24 | End: 2025-05-29

## 2025-05-24 RX ORDER — SODIUM CHLORIDE 9 G/1000ML
1000 INJECTION, SOLUTION INTRAVENOUS
Refills: 0 | Status: DISCONTINUED | OUTPATIENT
Start: 2025-05-24 | End: 2025-05-25

## 2025-05-24 RX ORDER — ENOXAPARIN SODIUM 100 MG/ML
70 INJECTION SUBCUTANEOUS ONCE
Refills: 0 | Status: COMPLETED | OUTPATIENT
Start: 2025-05-24 | End: 2025-05-24

## 2025-05-24 RX ORDER — MELATONIN 5 MG
3 TABLET ORAL AT BEDTIME
Refills: 0 | Status: DISCONTINUED | OUTPATIENT
Start: 2025-05-24 | End: 2025-05-29

## 2025-05-24 RX ORDER — ENOXAPARIN SODIUM 100 MG/ML
70 INJECTION SUBCUTANEOUS EVERY 12 HOURS
Refills: 0 | Status: DISCONTINUED | OUTPATIENT
Start: 2025-05-24 | End: 2025-05-26

## 2025-05-24 RX ORDER — MAGNESIUM, ALUMINUM HYDROXIDE 200-200 MG
30 TABLET,CHEWABLE ORAL EVERY 4 HOURS
Refills: 0 | Status: DISCONTINUED | OUTPATIENT
Start: 2025-05-24 | End: 2025-05-29

## 2025-05-24 RX ORDER — ATORVASTATIN CALCIUM 80 MG/1
20 TABLET, FILM COATED ORAL AT BEDTIME
Refills: 0 | Status: DISCONTINUED | OUTPATIENT
Start: 2025-05-24 | End: 2025-05-29

## 2025-05-24 RX ORDER — ONDANSETRON HCL/PF 4 MG/2 ML
4 VIAL (ML) INJECTION EVERY 8 HOURS
Refills: 0 | Status: DISCONTINUED | OUTPATIENT
Start: 2025-05-24 | End: 2025-05-29

## 2025-05-24 RX ORDER — LIPASE/PROTEASE/AMYLASE 10K-37.5K
2 CAPSULE,DELAYED RELEASE (ENTERIC COATED) ORAL
Refills: 0 | Status: DISCONTINUED | OUTPATIENT
Start: 2025-05-24 | End: 2025-05-29

## 2025-05-24 RX ORDER — ACETAMINOPHEN 500 MG/5ML
650 LIQUID (ML) ORAL EVERY 6 HOURS
Refills: 0 | Status: DISCONTINUED | OUTPATIENT
Start: 2025-05-24 | End: 2025-05-29

## 2025-05-24 RX ORDER — FUROSEMIDE 10 MG/ML
20 INJECTION INTRAMUSCULAR; INTRAVENOUS ONCE
Refills: 0 | Status: COMPLETED | OUTPATIENT
Start: 2025-05-24 | End: 2025-05-24

## 2025-05-24 RX ADMIN — FUROSEMIDE 20 MILLIGRAM(S): 10 INJECTION INTRAMUSCULAR; INTRAVENOUS at 17:07

## 2025-05-24 RX ADMIN — ENOXAPARIN SODIUM 70 MILLIGRAM(S): 100 INJECTION SUBCUTANEOUS at 17:07

## 2025-05-24 RX ADMIN — ATORVASTATIN CALCIUM 20 MILLIGRAM(S): 80 TABLET, FILM COATED ORAL at 21:41

## 2025-05-24 NOTE — ED PROVIDER NOTE - ATTENDING APP SHARED VISIT CONTRIBUTION OF CARE
Patient is an 79-year-old male who recently underwent right total hip arthroplasty, was discharged home after subacute rehab and now presents to the emergency room with bilateral lower extremity swelling with the left leg more swollen than the right.  He was recently prescribed Lasix which is a new medication for the weight gain and edema.  He has gained over 12 pounds since he started becoming edematous.  Denies any chest pain shortness of breath fevers or chills.  Denies any weeping or wound issues with the surgical wound.  Denies any history of blood clots bleeding or clotting disorders.    On evaluation is a well-developed well-nourished male no apparent distress.  Awake alert and oriented HEENT is unremarkable.  Cardiopulmonary examination is unremarkable except for some mild occasional APCs.  Abdomen is soft and nontender.  Musculoskeletal examination patient has bilateral lower extremity edema with left leg greater than right.  With mild ecchymosis to the left foot consistent with cerulea dolens findings of DVT.  Plan of care includes Doppler ultrasound which revealed a DVT.  Laboratory studies.  Patient will need to be admitted to the hospital reinitiated on anticoagulation, follow-up with vascular surgery, and other imaging as necessitated.  Patient was counseled to notify staff if you develop chest pain shortness of breath or worsening symptoms.  Family was at the bedside for the physical exam and evaluation.  This chart was made with dictation software and may contain typographical errors.

## 2025-05-24 NOTE — ED ADULT NURSE NOTE - OBJECTIVE STATEMENT
pt is AOX4, came to the ED with c/o b/l leg swelling. pt is able to voice needs. denies trauma. Pt did have recent HIP surgery and stayed in [facility] rehab. pt now presenting with worsening b/l leg swelling. denies SOB/fevers/chills/HA/CP/n/v/dizziness. Denies change in appetite. Denies blurry vision. pt denies calf pain. pending lab and radiology results. care ongoing. EKG completed.

## 2025-05-24 NOTE — H&P ADULT - NSICDXPASTMEDICALHX_GEN_ALL_CORE_FT
PA/NP only visit PAST MEDICAL HISTORY:  Bipolar disorder     H/O gastric ulcer back in 1978    MI (myocardial infarction)

## 2025-05-24 NOTE — ED PROVIDER NOTE - CARE PLAN
Principal Discharge DX:	Peripheral edema  Secondary Diagnosis:	Acute deep vein thrombosis (DVT) of left lower extremity   1

## 2025-05-24 NOTE — H&P ADULT - NSHPPHYSICALEXAM_GEN_ALL_CORE
PHYSICAL EXAM:  GENERAL: NAD, emaciated  EYES: conjunctiva and sclera clear  ENMT: Moist mucous membranes  NECK: Supple, +JVD, Normal thyroid  CHEST/LUNG: non labored, cta b/l  HEART: Regular rate and rhythm; No murmurs, rubs, or gallops  ABDOMEN: Soft, Nontender, Nondistended; Bowel sounds present  EXTREMITIES:  2+ Peripheral Pulses, No clubbing, no cyanosis, +edema  LYMPH: No lymphadenopathy noted  SKIN: No rashes or lesions

## 2025-05-24 NOTE — H&P ADULT - ASSESSMENT
79m with CAD, HLD, PUD with partial gastrectomy, chronic pancreatic insufficiency, bipolar disorder, p/w edema, below the knee DVT    DVT  likely provoked from recent surgery  start lovenox  may not need to continue given below the knee  hematology consult    Edema  seems more than can be explained by DVT  has hypoalbuminemia  third spacing +/- rt heart failure?  check tte  cardio consulted    elevated alk phos/hypoalbuminemia  has been present prior to this admission  will get US  check GGT  no protein noted in UA  hepatitis panel    chronic pancreatic insufficiency  due to remote stomach surgery?  continue creon    CAD/HLD  continue lipitor and asa

## 2025-05-24 NOTE — ED PROVIDER NOTE - ATTENDING SHARED VISIT SELECTOR YES
Patient states she would like a referral to [facility]. Patient states the doctor she has saw would not give her a shot for her back. Patient states she is having a lot of pain. Please advise.    [facility] phone:  [phone]    Call back [phone]   Yes

## 2025-05-24 NOTE — PATIENT PROFILE ADULT - FUNCTIONAL ASSESSMENT - BASIC MOBILITY SCORE.
Outpatient Medications Marked as Taking for the 12/10/19 encounter (Refill) with Richard Weaver, DO   Medication Sig Dispense Refill   • carvedilol (COREG) 6.25 MG tablet Take 1 tablet by mouth 2 times daily (with meals). 60 tablet 1   • spironolactone-hydrochlorothiazide (ALDACTAZIDE) 25-25 MG per tablet Take 1 tablet by mouth daily. 30 tablet 1        Ok to leave detailed Message: Yes  Informed caller of refill policy- 24-48 hours: Yes  No call back needed unless nurse has questions.     [facility]    PATIENT STATES BP HAS BEEN RUNNING HIGH SO NOT SURE IF CARVEDILOL NEEDS TO BE ADJUSTED.  PLEASE CALL.    12

## 2025-05-24 NOTE — ED PROVIDER NOTE - OBJECTIVE STATEMENT
78 yo male with h/o CAD/MI, bipolar disorder, recent right hip surgery for hip fx on 4/20 at [facility], [facility] home on 5/10)  presents to the ED c/o worsening bilateral lower extremity swelling x 1-2 weeks. Patient's wife spoke to his cardiologist who prescribed lasix 20 mg. Patient weighted 145 lb prior to surgery, most recently 157 lb. PAtint with swelling to bilateral legs, scrotum and abdomen. Patient did lose 3 lbs after starting lasix on Thursday. Patient scheduled for outpatient echo this week. Denies sob at rest or exertion. Denies fever, chills, chest pain, sob, abd pain, N/V, syncope, UE/LE weakness or paresthesias.     pmd: Dr. Parag Okeefe, cardiologist: Dr. João Keating.

## 2025-05-24 NOTE — ED ADULT NURSE REASSESSMENT NOTE - NS ED NURSE REASSESS COMMENT FT1
pt is AOX4, no signs of distress noted. Pt tolerating medication we per MD orders. Pt noted with 400ml in [place] catheter. draining clear yellow urine. no c/o CP/SOB/HA/dizziness/blurry vision. care ongoing.

## 2025-05-24 NOTE — ED ADULT NURSE NOTE - NS ED NOTE  TALK SOMEONE YN
No
Have Your Skin Lesions Been Treated?: not been treated
Is This A New Presentation, Or A Follow-Up?: Skin Lesions

## 2025-05-24 NOTE — ED ADULT TRIAGE NOTE - CHIEF COMPLAINT QUOTE
ambulatory Recent R THR in April.  Recent Dx of CHF and started lasix.  Has been noticing BLE edema.  No SOB

## 2025-05-24 NOTE — PATIENT PROFILE ADULT - FALL HARM RISK - HARM RISK INTERVENTIONS

## 2025-05-24 NOTE — ED ADULT NURSE NOTE - NSFALLHARMRISKINTERV_ED_ALL_ED

## 2025-05-24 NOTE — ED ADULT TRIAGE NOTE - AVIAN FLU SYMPTOMS
Rx albuterol 2 puffs every 4-6 hours to help with taking deep breaths, initially you might see your cough spasms get worse, however you should practice some deep breathing 10 to 15 minutes after using the albuterol.  Rx spacer device to use with the inhaler for effective administration  Rx Tessalon Perles 100 mg 3 times a day as needed for coughing spells during the daytime  Rx Cheratussin AC-this is a strong cough medication that has codeine and should be used only at night.  This suppresses your cough significantly so do not use it on a regular basis.  In the next 2-3 days if you do not see much improvement - START Rx Prednisone 20 mg once daily in the AM   Your strep culture is pending.  If this is positive you will be called and treated with an antibiotic at this time.  Probiotics, Zinc, Vit D 2000 international units and Vitamin C 2000 mg once daily recommended   Keep well hydrated with Body armor, Pedialyte, soups, broths, target at least 100 to 120 ounces of fluids per day  Follow up with PMD in the next 3-5 days with regards to current symptoms and with regards to elevated BP   Deep breathing exercises recommended   If any worsening of symptoms GO TO THE ER       No

## 2025-05-24 NOTE — ED ADULT NURSE NOTE - CAS TRG GENERAL NORM CIRC DET
Next appt 9-25-19  Last appt 5-31-19    Refill request for   Disp Refills Start End    Vitamin D, Ergocalciferol, 93612 units capsule 24 capsule 3 9/27/2018     Sig: TAKE ONE CAPSULE BY MOUTH TWICE A WEEK      Refilled per standing med protocol.
Strong peripheral pulses

## 2025-05-24 NOTE — H&P ADULT - HISTORY OF PRESENT ILLNESS
79m with CAD, HLD, PUD with partial gastrectomy, chronic pancreatic insufficiency, bipolar disorder, recent right hip surgery for hip fx on 4/20 at [facility], then went to [facility] (dced home on 5/10)  presents to the ED c/o worsening bilateral lower extremity swelling x 1-2 weeks. Patient's wife spoke to his cardiologist who prescribed lasix 20 mg. Patient weighted 145 lb prior to surgery, most recently 157 lb. PAtint with swelling to bilateral legs, scrotum and abdomen. Patient did lose 3 lbs after starting lasix on Thursday. Patient scheduled for outpatient echo this week. Denies sob at rest or exertion. Denies fever, chills, chest pain, sob, abd pain, N/V, syncope, UE/LE weakness or paresthesias

## 2025-05-25 LAB
ALBUMIN SERPL ELPH-MCNC: 2.2 G/DL — LOW (ref 3.3–5)
ALP SERPL-CCNC: 254 U/L — HIGH (ref 40–120)
ALT FLD-CCNC: 18 U/L — SIGNIFICANT CHANGE UP (ref 12–78)
ANION GAP SERPL CALC-SCNC: 6 MMOL/L — SIGNIFICANT CHANGE UP (ref 5–17)
AST SERPL-CCNC: 15 U/L — SIGNIFICANT CHANGE UP (ref 15–37)
BILIRUB SERPL-MCNC: 0.5 MG/DL — SIGNIFICANT CHANGE UP (ref 0.2–1.2)
BUN SERPL-MCNC: 18 MG/DL — SIGNIFICANT CHANGE UP (ref 7–23)
CALCIUM SERPL-MCNC: 8.4 MG/DL — LOW (ref 8.5–10.1)
CHLORIDE SERPL-SCNC: 105 MMOL/L — SIGNIFICANT CHANGE UP (ref 96–108)
CO2 SERPL-SCNC: 28 MMOL/L — SIGNIFICANT CHANGE UP (ref 22–31)
CREAT SERPL-MCNC: 0.7 MG/DL — SIGNIFICANT CHANGE UP (ref 0.5–1.3)
EGFR: 93 ML/MIN/1.73M2 — SIGNIFICANT CHANGE UP
EGFR: 93 ML/MIN/1.73M2 — SIGNIFICANT CHANGE UP
GGT SERPL-CCNC: 11 U/L — SIGNIFICANT CHANGE UP (ref 9–50)
GLUCOSE SERPL-MCNC: 84 MG/DL — SIGNIFICANT CHANGE UP (ref 70–99)
HAV IGM SER-ACNC: SIGNIFICANT CHANGE UP
HBV CORE IGM SER-ACNC: SIGNIFICANT CHANGE UP
HBV SURFACE AG SER-ACNC: SIGNIFICANT CHANGE UP
HCT VFR BLD CALC: 30.8 % — LOW (ref 39–50)
HCV AB S/CO SERPL IA: 0.1 S/CO — SIGNIFICANT CHANGE UP (ref 0–0.79)
HCV AB SERPL-IMP: SIGNIFICANT CHANGE UP
HGB BLD-MCNC: 9.9 G/DL — LOW (ref 13–17)
MAGNESIUM SERPL-MCNC: 1.9 MG/DL — SIGNIFICANT CHANGE UP (ref 1.6–2.6)
MCHC RBC-ENTMCNC: 30.1 PG — SIGNIFICANT CHANGE UP (ref 27–34)
MCHC RBC-ENTMCNC: 32.1 G/DL — SIGNIFICANT CHANGE UP (ref 32–36)
MCV RBC AUTO: 93.6 FL — SIGNIFICANT CHANGE UP (ref 80–100)
NRBC BLD AUTO-RTO: 0 /100 WBCS — SIGNIFICANT CHANGE UP (ref 0–0)
PLATELET # BLD AUTO: 279 K/UL — SIGNIFICANT CHANGE UP (ref 150–400)
POTASSIUM SERPL-MCNC: 3.7 MMOL/L — SIGNIFICANT CHANGE UP (ref 3.5–5.3)
POTASSIUM SERPL-SCNC: 3.7 MMOL/L — SIGNIFICANT CHANGE UP (ref 3.5–5.3)
PROT SERPL-MCNC: 5.3 G/DL — LOW (ref 6–8.3)
RBC # BLD: 3.29 M/UL — LOW (ref 4.2–5.8)
RBC # FLD: 17 % — HIGH (ref 10.3–14.5)
SODIUM SERPL-SCNC: 139 MMOL/L — SIGNIFICANT CHANGE UP (ref 135–145)
WBC # BLD: 6.01 K/UL — SIGNIFICANT CHANGE UP (ref 3.8–10.5)
WBC # FLD AUTO: 6.01 K/UL — SIGNIFICANT CHANGE UP (ref 3.8–10.5)

## 2025-05-25 PROCEDURE — 76700 US EXAM ABDOM COMPLETE: CPT | Mod: 26

## 2025-05-25 RX ORDER — FUROSEMIDE 10 MG/ML
20 INJECTION INTRAMUSCULAR; INTRAVENOUS DAILY
Refills: 0 | Status: DISCONTINUED | OUTPATIENT
Start: 2025-05-25 | End: 2025-05-27

## 2025-05-25 RX ADMIN — ENOXAPARIN SODIUM 70 MILLIGRAM(S): 100 INJECTION SUBCUTANEOUS at 17:42

## 2025-05-25 RX ADMIN — Medication 2 CAPSULE(S): at 14:14

## 2025-05-25 RX ADMIN — FUROSEMIDE 20 MILLIGRAM(S): 10 INJECTION INTRAMUSCULAR; INTRAVENOUS at 09:15

## 2025-05-25 RX ADMIN — Medication 2 CAPSULE(S): at 17:43

## 2025-05-25 RX ADMIN — Medication 81 MILLIGRAM(S): at 14:14

## 2025-05-25 RX ADMIN — Medication 20 MILLIGRAM(S): at 14:14

## 2025-05-25 RX ADMIN — ATORVASTATIN CALCIUM 20 MILLIGRAM(S): 80 TABLET, FILM COATED ORAL at 21:52

## 2025-05-25 RX ADMIN — ENOXAPARIN SODIUM 70 MILLIGRAM(S): 100 INJECTION SUBCUTANEOUS at 05:05

## 2025-05-25 NOTE — CONSULT NOTE ADULT - ASSESSMENT
The patient is an 79 year old male with a history of HTN, HL, CAD s/p PCI, anemia, bipolar disorder who presents with worsening lower extremity swelling.    Plan:  - ECG with sinus rhythm and no evidence of ischemia/infarction  - Echo 2/23 with normal LV systolic function, no significant valve issues  - Repeat echo pending  - CTA chest with small right and trace left pleural effusions  - BNP normal suggesting that leg swelling is not due to heart failure  - He previously had low albumin which can lead to third spacing  - LE venous duplex with left gastrocnemius vein DVT  - Continue aspirin 81 mg daily  - Continue atorvastatin 20 mg daily  - Continue full dose enoxaparin for DVT  - Will hold IV fluids and start furosemide 20 mg IV daily to see if any improvement in lower extremity swelling with mild IV diuresis
80 yo man w hx CAD, HLD, PUD post partial gastrectomy, chronic pancreatic inisuff, bipolar, recent fall w riht jip fx 4/20 post repair followed by tx to [facility] and home since 5/10  Adm w scooby legs edema x 1-2 wks  Reports was not on anticoag while in Rehab  Still w very limited mobility  Duplex scooby legs 5/24/25 acute left gastrocnemius DVT started on Lovenox. Continues on ASA  CTA chest, a/p-no PE, incr consolidation adj to HARITHA plaque progressiion fibrosis vs pna, trce-sm scooby effusion, mild right hydro w nonobstructive renal stone, gallsones  No SOB/CP  No self prior or famiy hx thromboembolism  CBC on adm 5/24 Hgb 11.6  today 5/25 wbc 6 hgb 9.9 mcv 93.6 okts 279  old labs in computer first week May hgb motly 8s  4/24 iron 24 TIBC 268 sat 9% ferritin 108 folate 5.6    -acute left gastrocnemius DVT in setting of still decr mobility post right hip fx repair 4/20, c/w provoked DVT  -recommend limited duration of anticoagulation, 3months, or longer if still not back to baseline active level by 3months time  -Hgb decr from 11.6 to 9.9 next day since adm but no signs of gross bleeds, continue observation to ensure stablity, no no further decr/signs bleed and no other invasive procedures being consdiered can then change to oral DOAC(has adequate eGFR)  -Hgb was 8s 2 wks ago therefore the first hgb of 11s may be higher than true level    thank you, will follow w you
chf  abnormal imaging    CT reviewed  negative for internal hernia  proton pump inhibitor bid  no signs of sbo  start clears  monitor GI fn  lactulose bid  f/u u/s

## 2025-05-25 NOTE — CONSULT NOTE ADULT - SUBJECTIVE AND OBJECTIVE BOX
All records reviewed.  PMD Dr Parag Okeefe    HPI:    80 yo man w hx CAD, HLD, PUD post partial gastrectomy, chronic pancreatic inisuff, bipolar, recent fall w riht jip fx 4/20 post repair followed by tx to [facility] and home since 5/10  Adm w scooby legs edema x 1-2 wks  Reports was not on anticoag while in Rehab  Still w very limited mobility  Duplex scooby legs 5/24/25 acute left gastrocnemius DVT started on Lovenox. Continues on ASA  CTA chest, a/p-no PE, incr consolidation adj to HARITHA plaque progressiion fibrosis vs pna, trce-sm scooby effusion, mild right hydro w nonobstructive renal stone, gallsones    No SOB/CP  No self prior or famiy hx thromboembolism  CBC on adm 5/24 Hgb 11.6  today 5/25 wbc 6 hgb 9.9 mcv 93.6 okts 279  old labs in computer first week May hgb motly 8s  4/24 iron 24 TIBC 268 sat 9% ferritin 108 folate 5.6        PAST MEDICAL & SURGICAL HISTORY:  Bipolar disorder      H/O gastric ulcer  back in 1978      MI (myocardial infarction)      History of gastrectomy  in 1978, for gastric ulcer          Review of System:  see above, never had left calf pain    MEDICATIONS  (STANDING):  aspirin enteric coated 81 milliGRAM(s) Oral daily  atorvastatin 20 milliGRAM(s) Oral at bedtime  enoxaparin Injectable 70 milliGRAM(s) SubCutaneous every 12 hours  famotidine    Tablet 20 milliGRAM(s) Oral daily  furosemide   Injectable 20 milliGRAM(s) IV Push daily  pancrelipase  (CREON 36,000 Lipase Units) 2 Capsule(s) Oral three times a day with meals    MEDICATIONS  (PRN):  acetaminophen     Tablet .. 650 milliGRAM(s) Oral every 6 hours PRN Temp greater or equal to 38C (100.4F), Mild Pain (1 - 3)  aluminum hydroxide/magnesium hydroxide/simethicone Suspension 30 milliLiter(s) Oral every 4 hours PRN Dyspepsia  melatonin 3 milliGRAM(s) Oral at bedtime PRN Insomnia  ondansetron Injectable 4 milliGRAM(s) IV Push every 8 hours PRN Nausea and/or Vomiting      Allergies    No Known Allergies    Intolerances        SOCIAL HISTORY:  dc tobacco 20+ years    FAMILY HISTORY:  Family history of heart attack (Father)        Vital Signs Last 24 Hrs  T(C): 36.8 (25 May 2025 11:47), Max: 36.8 (25 May 2025 11:47)  T(F): 98.3 (25 May 2025 11:47), Max: 98.3 (25 May 2025 11:47)  HR: 71 (25 May 2025 11:47) (70 - 86)  BP: 117/60 (25 May 2025 11:47) (117/60 - 156/71)  BP(mean): --  RR: 18 (25 May 2025 11:47) (18 - 20)  SpO2: 96% (25 May 2025 11:47) (96% - 99%)    Parameters below as of 25 May 2025 11:47  Patient On (Oxygen Delivery Method): room air        PHYSICAL EXAM:      General: in no acute distress  Eyes:sclera anicteric, pupils equal and EOMI  ENMT:buccal mucosa moist  Neck:supple, trachea midline  Lungs:clear, no wheeze/rhonchi  Cardiovascular:regular rate and rhythm, S1 S2  Abdomen:soft, nontender, no organomegaly present, bowel sounds normal  Neurological:alert and oriented x3, Cranial Nerves II-XII grossly intact  Skin:no increased ecchymosis/petechiae/purpura  Lymph Nodes:no palpable cervical/supraclavicular lymph nodes enlargements  Extremities:no cyanosis/clubbing. Right leg trace edema from hip to ankle, No sig edema left leg        LABS:                        9.9    6.01  )-----------( 279      ( 25 May 2025 07:20 )             30.8     05-25 @ 07:20  WBC6.01  RBC3.29 Hgb9.9 Hct30.8  MCV93.6  [id number]  Auto Neutro-- Band-- Auto Lymph-- Atypical Lymph-- Reactive Lymph-- Auto Mono-- Auto Eos-- Auto Baso--        05-24 @ 15:04  WBC6.40  RBC3.85 Hgb11.6 Hct36.5  MCV94.8  Youc324  Auto Neutro-- Band-- Auto Lymph-- Atypical Lymph-- Reactive Lymph-- Auto Mono-- Auto Eos-- Auto Baso--          05-25    139  |  105  |  18  ----------------------------<  84  3.7   |  28  |  0.70    Ca    8.4[L]      25 May 2025 07:20  Mg     1.9     05-25    TPro  5.3[L]  /  Alb  2.2[L]  /  TBili  0.5  /  DBili  x   /  AST  15  /  ALT  18  /  AlkPhos  254[H]  05-25 05-24 @ 17:13  PT11.7 INR0.99  PTT29.9    Urinalysis Basic - ( 25 May 2025 07:20 )    Color: x / Appearance: x / SG: x / pH: x  Gluc: 84 mg/dL / Ketone: x  / Bili: x / Urobili: x   Blood: x / Protein: x / Nitrite: x   Leuk Esterase: x / RBC: x / WBC x   Sq Epi: x / Non Sq Epi: x / Bacteria: x        PERIPHERAL BLOOD SMEAR REVIEW:      RADIOLOGY & ADDITIONAL STUDIES:  < from: US Duplex Venous Lower Ext Complete, Bilateral (05.24.25 @ 15:43) >    ACC: [id number] EXAM:  US DPLX LWR EXT VEINS COMPL BI   ORDERED BY:   ADRIEN DONOHUE     PROCEDURE DATE:  05/24/2025          INTERPRETATION:  CLINICAL INFORMATION: Bilateral lower extremity swelling.    COMPARISON: April 26, 2025.    TECHNIQUE: Duplex sonography of the BILATERAL LOWER extremity veins with   color and spectral Doppler, with and without compression.    FINDINGS:    RIGHT:  Normal compressibility of the RIGHT common femoral, femoral and popliteal   veins.  Doppler examination shows normal spontaneous and phasic flow.  No RIGHT calf vein thrombosis is detected.    LEFT:  Normal compressibility of the LEFT common femoral, femoral and popliteal   veins.  Acute deep venous thrombosis in the left gastrocnemius vein.    IMPRESSION:  Acute deep venous thrombosis: below the knee.    Acute deep venous thrombosis in the left gastrocnemius vein.    Findings were discussed with PERI DONOHUE on 5/24/2025 at 3:49 PM by Dr. Callejas with read back confirmation.    --- End of Report ---        < end of copied text >  < from: CT Angio Chest PE Protocol w/ IV Cont (05.24.25 @ 19:53) >    ACC: [id number] EXAM:  CT ABDOMEN AND PELVIS IC   ORDERED BY: LARS KWOK     ACC: [id number] EXAM:  CT ANGIO CHEST PULM ART WAWIC   ORDERED BY: LARS KWOK     PROCEDURE DATE:  05/24/2025          INTERPRETATION:  CLINICAL INFORMATION:DVT with recent surgery, new   anasarca, low protein, and from a seated. Rule out pulmonary embolism and   evaluate for mass.    COMPARISON: Chest CT dated 5/24/2017, CT pelvis dated 4/21/2025, and   lumbar spine CT dated 12/23/2024.    CONTRAST/COMPLICATIONS:  IV Contrast: Omnipaque 350  90 cc administered   10 cc discarded  Oral Contrast: NONE  Complications: None documented at time of interpretation.    PROCEDURE:  CT Angiography of the Chest was performed followed by portal venous phase   imaging of the Abdomen and Pelvis.  Sagittal and coronal reformats were performed as well as 3D (MIP)   reconstructions.    Note: Please note that some voice-recognition transcription errors may be   missed due to low accuracy of [employer] voice-recognition software,   which resulted in numerous errors during dictation of this report.    FINDINGS:  CHEST:  LUNGS AND LARGE AIRWAYS: Patent central airways. There are subtle   groundglass tree-in-bud nodular opacities within the left lower lobe.   Right greater than left bibasilar atelectasis adjacent to the pleural   effusions.  PLEURA: Very small right pleural effusion and trace left pleural   effusion. There is bilateral posterior upper lobe predominant pleural   thickening and calcification with new consolidative opacity along the   left pleural calcification (302-44). The calcified pleural plaques are   similar to 5/24/2017.  VESSELS: The pulmonary arteries are well opacified and evaluated without   evidence of filling defect. The thoracicaorta is normal in caliber with   moderate scattered atherosclerosis.  HEART: Heart size is normal. No pericardial effusion. Coronary artery   calcifications are present.  MEDIASTINUM AND GAEL: No lymphadenopathy. Diffuse markedly distended   esophagus containing fluid, debris, and gas up to the thoracic inlet with   a patent gastroesophageal junction. The lower esophagus is also   thick-walled.  CHEST WALL AND LOWER NECK: Within normal limits.    ABDOMEN AND PELVIS:  LIVER: Within normal limits.  BILE DUCTS: Normal caliber.  GALLBLADDER: Multiple calcified gallstones are present.  SPLEEN: Within normal limits.  PANCREAS: Within normal limits.  ADRENALS: Within normal limits.  KIDNEYS/URETERS: Mild right hydroureteronephrosis to the level of the   right UVJ without appreciable obstructing stone or mass. There are a few   small nonobstructing right intrarenal calculi measuring up to 3 mm in   size. No left urinary tract calculi or hydronephrosis.    BLADDER: Within normal limits.  REPRODUCTIVE ORGANS: Prostate within normal limits.    BOWEL: Postsurgical changes from gastric bypass with a markedly distended   gastric pouch and patent gastroesophageal junction with marked dilatation   of the entirety of the esophagus as detailed above. Appendix is normal.   There is a large amount stool throughout the colon.  PERITONEUM/RETROPERITONEUM: Within normal limits.  VESSELS: Extensive atelectatic calcifications of the aorta, iliofemoral   arteries, and medium and small caliber arteries suggestive of chronic   diabetes. No aortic aneurysm. No DVT identified within the pelvis.  LYMPH NODES: No lymphadenopathy.  ABDOMINAL WALL: Small right inguinal hernia containing a nonobstructed   loop of bowel. Body wall edema is noted.  BONES: The bones are diffusely osteopenic and there are multilevel   degenerative changes of the spine. There is grade 1 anterolisthesis of L4   relative to L5. There is a chronic healed S2 sacral fracture and gopi and   screw fixation of the proximal right femur with surrounding heterotopic   ossification and incompletely healing of the femoral fracture. No acute   fractures identified.    IMPRESSION:  1. No evidence of pulmonary embolism.  2. Small right and trace left pleural effusions with adjacent atelectasis   and interval development of focal consolidative opacity adjacent to a   posterior left upper lobe pleural plaque, either representing progressive   scarring or superimposed pneumonia. Follow-up chest CT is suggested in 3   months.  3. Status post gastric bypass with a markedly distended gastric pouch and   esophagus with wall thickening of the distal esophagus. This places the   patient at increased risk of aspiration.  4. Mild right hydroureteronephrosis to the level of the right UVJ without   interval obstructing stone or mass with symmetric enhancement of the   kidneys. This is nonspecific and could be due to a UVJ stricture.  5. There are a few small nonobstructing right intrarenal calculi   measuring up to 3 mm in size.  6. Cholelithiasis.  7. No evidence of malignancy within the chest, abdomen, or pelvis.    --- End of Report ---        < end of copied text >  
[facility]    Ibrahima Prashant Jenkins NP    [address] [place] [address] [phone]      Chief Complaint:  Patient is a 79y old  Male who presents with a chief complaint of     HPI:79m with CAD, HLD, PUD with partial gastrectomy, chronic pancreatic insufficiency, bipolar disorder, recent right hip surgery for hip fx on  at [facility], then went to [facility] on 5/10)  presents to the ED c/o worsening bilateral lower extremity swelling x 1-2 weeks. Patient's wife spoke to his cardiologist who prescribed lasix 20 mg. Patient weighted 145 lb prior to surgery, most recently 157 lb. PAtint with swelling to bilateral legs, scrotum and abdomen. Patient did lose 3 lbs after starting lasix on Thursday. Patient scheduled for outpatient echo this week. Denies sob at rest or exertion. Denies fever, chills, chest pain, sob, abd pain, N/V, syncope, UE/LE weakness or paresthesias    Allergies:  No Known Allergies      Medications:  acetaminophen     Tablet .. 650 milliGRAM(s) Oral every 6 hours PRN  aluminum hydroxide/magnesium hydroxide/simethicone Suspension 30 milliLiter(s) Oral every 4 hours PRN  aspirin enteric coated 81 milliGRAM(s) Oral daily  atorvastatin 20 milliGRAM(s) Oral at bedtime  enoxaparin Injectable 70 milliGRAM(s) SubCutaneous every 12 hours  famotidine    Tablet 20 milliGRAM(s) Oral daily  furosemide   Injectable 20 milliGRAM(s) IV Push daily  melatonin 3 milliGRAM(s) Oral at bedtime PRN  ondansetron Injectable 4 milliGRAM(s) IV Push every 8 hours PRN  pancrelipase  (CREON 36,000 Lipase Units) 2 Capsule(s) Oral three times a day with meals      PMHX/PSHX:  Bipolar disorder    H/O gastric ulcer    MI (myocardial infarction)    History of gastrectomy        Family history:  Family history of heart attack (Father)        Social History:     ROS:     General:  No wt loss, fevers, chills, night sweats, fatigue,   Eyes:  Good vision, no reported pain  ENT:  No sore throat, pain, runny nose, dysphagia  CV:  No pain, palpitations, hypo/hypertension  Resp:  No dyspnea, cough, tachypnea, wheezing  GI:  No pain, No nausea, No vomiting, No diarrhea, No constipation, No weight loss, No fever, No pruritis, No rectal bleeding, No tarry stools, No dysphagia,  :  No pain, bleeding, incontinence, nocturia  Muscle:  No pain, weakness  Neuro:  No weakness, tingling, memory problems  Psych:  No fatigue, insomnia, mood problems, depression  Endocrine:  No polyuria, polydipsia, cold/heat intolerance  Heme:  No petechiae, ecchymosis, easy bruisability  Skin:  No rash, tattoos, scars, edema      PHYSICAL EXAM:   Vital Signs:  Vital Signs Last 24 Hrs  T(C): 36.8 (25 May 2025 11:47), Max: 36.8 (25 May 2025 11:47)  T(F): 98.3 (25 May 2025 11:47), Max: 98.3 (25 May 2025 11:47)  HR: 71 (25 May 2025 11:47) (70 - 86)  BP: 117/60 (25 May 2025 11:47) (117/60 - 156/71)  BP(mean): --  RR: 18 (25 May 2025 11:47) (18 - 20)  SpO2: 96% (25 May 2025 11:47) (96% - 99%)    Parameters below as of 25 May 2025 11:47  Patient On (Oxygen Delivery Method): room air      Daily Height in cm: 182.88 (24 May 2025 13:47)    Daily Weight in k.2 (25 May 2025 05:40)    GENERAL:  Appears stated age, well-groomed, well-nourished, no distress  HEENT:  NC/AT,  conjunctivae clear and pink, no thyromegaly, nodules, adenopathy, no JVD, sclera -anicteric  CHEST:  Full & symmetric excursion, no increased effort, breath sounds clear  HEART:  Regular rhythm, S1, S2, no murmur/rub/S3/S4, no abdominal bruit, no edema  ABDOMEN:  Soft, non-tender, non-distended, normoactive bowel sounds,  no masses ,no hepato-splenomegaly, no signs of chronic liver disease  EXTEREMITIES:  no cyanosis,clubbing or edema  SKIN:  No rash/erythema/ecchymoses/petechiae/wounds/abscess/warm/dry  NEURO:  Alert, oriented, no asterixis, no tremor, no encephalopathy    LABS:                        9.9    6.01  )-----------( 279      ( 25 May 2025 07:20 )             30.8         139  |  105  |  18  ----------------------------<  84  3.7   |  28  |  0.70    Ca    8.4[L]      25 May 2025 07:20  Mg     1.9         TPro  5.3[L]  /  Alb  2.2[L]  /  TBili  0.5  /  DBili  x   /  AST  15  /  ALT  18  /  AlkPhos  254[H]      LIVER FUNCTIONS - ( 25 May 2025 07:20 )  Alb: 2.2 g/dL / Pro: 5.3 g/dL / ALK PHOS: 254 U/L / ALT: 18 U/L / AST: 15 U/L / GGT: 11 U/L       PT/INR - ( 24 May 2025 17:13 )   PT: 11.7 sec;   INR: 0.99 ratio         PTT - ( 24 May 2025 17:13 )  PTT:29.9 sec  Urinalysis Basic - ( 25 May 2025 07:20 )    Color: x / Appearance: x / SG: x / pH: x  Gluc: 84 mg/dL / Ketone: x  / Bili: x / Urobili: x   Blood: x / Protein: x / Nitrite: x   Leuk Esterase: x / RBC: x / WBC x   Sq Epi: x / Non Sq Epi: x / Bacteria: x          Imaging:              
History of Present Illness: The patient is an 79 year old male with a history of HTN, HL, CAD s/p PCI, anemia, bipolar disorder who presents with worsening lower extremity swelling. He was recently at [facility] for a hip fracture and was discharged to rehab. While there, his legs and abdomen became more swollen. No chest pain or shortness of breath.    Past Medical/Surgical History:  HTN, HL, CAD s/p PCI, anemia, bipolar disorder    Medications:  Home Medications:  Pepcid 20 mg oral tablet: 1 tab(s) orally once a day (24 May 2025 17:48)      Family History: Non-contributory family history of premature cardiovascular atherosclerotic disease    Social History: No tobacco, alcohol or drug use    Review of Systems:  General: No fevers, chills, weight gain  Skin: No rashes, color changes  Cardiovascular: No chest pain, orthopnea  Respiratory: No shortness of breath, cough  Gastrointestinal: No nausea, abdominal pain  Genitourinary: No incontinence, pain with urination  Musculoskeletal: No pain, swelling, decreased range of motion  Neurological: No headache, weakness  Psychiatric: No depression, anxiety  Endocrine: No weight gain, increased thirst  All other systems are comprehensively negative.    Physical Exam:  Vitals:        Vital Signs Last 24 Hrs  T(C): 36.7 (25 May 2025 05:40), Max: 36.7 (25 May 2025 05:40)  T(F): 98.1 (25 May 2025 05:40), Max: 98.1 (25 May 2025 05:40)  HR: 86 (25 May 2025 05:40) (70 - 86)  BP: 134/64 (25 May 2025 05:40) (134/64 - 156/71)  BP(mean): --  RR: 18 (25 May 2025 05:40) (18 - 20)  SpO2: 96% (25 May 2025 05:40) (96% - 99%)    Parameters below as of 25 May 2025 05:40  Patient On (Oxygen Delivery Method): room air      General: NAD  HEENT: MMM  Neck: No JVD, no carotid bruit  Lungs: CTAB  CV: RRR, nl S1/S2, no M/R/G  Abdomen: S/NT/ND, +BS  Extremities: 1-2+ LE edema, no cyanosis  Neuro: AAOx3, non-focal  Skin: No rash    Labs:                        9.9    6.01  )-----------( 279      ( 25 May 2025 07:20 )             30.8     05-24    136  |  100  |  18  ----------------------------<  137[H]  3.6   |  30  |  0.74    Ca    8.8      24 May 2025 18:09  Mg     2.0     05-24    TPro  6.1  /  Alb  2.5[L]  /  TBili  0.5  /  DBili  x   /  AST  19  /  ALT  21  /  AlkPhos  296[H]  05-24        PT/INR - ( 24 May 2025 17:13 )   PT: 11.7 sec;   INR: 0.99 ratio         PTT - ( 24 May 2025 17:13 )  PTT:29.9 sec    ECG/Telemetry: NSR, normal axis, no ST abnormality    
VASCULAR SURGERY PA CONSULT NOTE:    CHIEF COMPLAINT:  Patient is a 79y old  Male who presents with a chief complaint of BLE swelling.    HPI:  HPI:  80 yo male with h/o CAD/MI, bipolar disorder, recent right hip surgery for hip fx on 4/20 at [facility], [facility] home on 5/10)  presents to the ED c/o worsening bilateral lower extremity swelling x 1-2 weeks. Patient's wife spoke to his cardiologist who prescribed lasix 20 mg. Patient weighted 145 lb prior to surgery, most recently 157 lb. PAtint with swelling to bilateral legs, scrotum and abdomen. Patient did lose 3 lbs after starting lasix on Thursday. Patient scheduled for outpatient echo this week. Denies sob at rest or exertion. Denies fever, chills, chest pain, sob, abd pain, N/V, syncope, UE/LE weakness or paresthesias.     	pmd: Dr. Parag Okeefe, cardiologist: Dr. João Keating.    INTERVAL HPI:  79yoM with PMHx CAD s/p PCI (2012) on ASA, bipolar, GERD, recent R hip fracture with ORIF in [facility] 4/21 was at [facility] until 5/10 and was on lovenox until that point, now c/o BLE edema. Patient's wife at bedside provides additional history. Pt reports hx of foot edema previous to surgery, now for the past 1-2 weeks with persistent BLE edema, extending from foot to knee and now to the level of the pelvis per patient's wife. Patient notes difficulty walking with PT at home this past week, and difficulty lifting L leg, prompting him to see orthopedic who advised him to visit cardiologist. Cardiologist prescribed lasix, which initially alleviated some of the edema, however effectivity was limited. Patient notes redness to the L forefoot for the past 2-3 days. Reports hx of "shooting pains" spontaneously down the L calf intermittently. Denies chest pain, sob, hx of coagulopathy, previous DVTs, claudication symptoms, paresthesias, paralysis.       PAST MEDICAL & SURGICAL HISTORY:  Bipolar disorder      H/O gastric ulcer  back in 1978      MI (myocardial infarction)      History of gastrectomy  in 1978, for gastric ulcer        REVIEW OF SYSTEMS:  CONSTITUTIONAL: No weakness, fevers or chills, no weight loss  EYES/ENT: No visual changes;  No vertigo or throat pain   NECK: No pain or stiffness  RESPIRATORY: No cough, wheezing, hemoptysis; No shortness of breath  CARDIOVASCULAR: No chest pain or palpitations  GASTROINTESTINAL: No abdominal or epigastric pain. No nausea, vomiting, or hematemesis; No diarrhea or constipation. No melena or hematochezia.  GENITOURINARY: No dysuria, frequency or hematuria  MSK: See HPI  NEUROLOGICAL: No numbness or weakness  SKIN: +color change to L forefoot  All other review of systems is negative unless indicated above.    MEDICATIONS:  Home Medications:  Pepcid 20 mg oral tablet: 1 tab(s) orally once a day (24 May 2025 17:48)    MEDICATIONS  (STANDING):  aspirin enteric coated 81 milliGRAM(s) Oral daily  atorvastatin 20 milliGRAM(s) Oral at bedtime  enoxaparin Injectable 70 milliGRAM(s) SubCutaneous every 12 hours  famotidine    Tablet 20 milliGRAM(s) Oral daily  pancrelipase  (CREON 36,000 Lipase Units) 2 Capsule(s) Oral three times a day with meals    MEDICATIONS  (PRN):  acetaminophen     Tablet .. 650 milliGRAM(s) Oral every 6 hours PRN Temp greater or equal to 38C (100.4F), Mild Pain (1 - 3)  aluminum hydroxide/magnesium hydroxide/simethicone Suspension 30 milliLiter(s) Oral every 4 hours PRN Dyspepsia  melatonin 3 milliGRAM(s) Oral at bedtime PRN Insomnia  ondansetron Injectable 4 milliGRAM(s) IV Push every 8 hours PRN Nausea and/or Vomiting      ALLERGIES:  No Known Allergies  Intolerances      SOCIAL HISTORY:  Smoking: Remote hx, quit 50 years ago  ETOH  Yes [ ]  No [ ]  Social [ ] - infrequent (1 glass of wine)  DRUGS:  Yes [ ]  No [x ]  if so what______________    FAMILY HISTORY:  Family history of heart attack (Father)    PHYSICAL EXAM:  Vital Signs Last 24 Hrs  T(C): 36.2 (24 May 2025 13:47), Max: 36.2 (24 May 2025 13:47)  T(F): 97.1 (24 May 2025 13:47), Max: 97.1 (24 May 2025 13:47)  HR: 74 (24 May 2025 13:47) (74 - 74)  BP: 150/79 (24 May 2025 13:47) (150/79 - 150/79)  RR: 18 (24 May 2025 13:47) (18 - 18)  SpO2: 99% (24 May 2025 13:47) (99% - 99%)    Parameters below as of 24 May 2025 13:47  Patient On (Oxygen Delivery Method): room air      CONSTITUTIONAL: No apparent distress, lying comfortably in bed  HEAD:  Atraumatic, normocephalic  EYES: EOMI, PERRLA, conjunctiva and sclera clear  ENMT: Oral mucosa with moist membranes. Normal dentition; no pharyngeal injection or exudates  NECK: Supple, symmetric and without tracheal deviation   RESP: No respiratory distress, no use of accessory muscles  CV: RRR  GI: Soft, NT, ND, no rebound, no guarding  EXTREMITIES: BLE pitting edema 1+. Edema to the R hip > L. Surgical scar of the R hip present, well healed. L foot slightly colder in temperature as compared to R foot. L forefoot with slight erythema, bulla of the L 4th toe with overlying eschar. Palpable b/l DP and PT pulses, palpable R popliteal, L popliteal faintly palpable, L femoral pulse palpable, R femoral pulse palpable. Sensation intact to BLE including feet. Motor function to the BLE intact.   PSYCH: A&O x3  NEUROLOGY: Non-focal, motor & sensory grossly intact  SKIN: Warm & dry, no rashes or lesions; no subcutaneous nodules or induration palpable    LABS:                        11.6   6.40  )-----------( 289      ( 24 May 2025 15:04 )             36.5     05-24    135  |  101  |  19  ----------------------------<  110[H]  4.3   |  29  |  0.75    Ca    8.8      24 May 2025 15:04    TPro  6.1  /  Alb  2.5[L]  /  TBili  0.5  /  DBili  x   /  AST  19  /  ALT  21  /  AlkPhos  296[H]  05-24    PT/INR - ( 24 May 2025 17:13 )   PT: 11.7 sec;   INR: 0.99 ratio         PTT - ( 24 May 2025 17:13 )  PTT:29.9 sec    Urinalysis Basic - ( 24 May 2025 15:04 )    Color: x / Appearance: x / SG: x / pH: x  Gluc: 110 mg/dL / Ketone: x  / Bili: x / Urobili: x   Blood: x / Protein: x / Nitrite: x   Leuk Esterase: x / RBC: x / WBC x   Sq Epi: x / Non Sq Epi: x / Bacteria: x      LIVER FUNCTIONS - ( 24 May 2025 15:04 )  Alb: 2.5 g/dL / Pro: 6.1 g/dL / ALK PHOS: 296 U/L / ALT: 21 U/L / AST: 19 U/L / GGT: x             RADIOLOGY & ADDITIONAL STUDIES:  < from: US Duplex Venous Lower Ext Complete, Bilateral (05.24.25 @ 15:43) >    ACC: [id number] EXAM:  US DPLX LWR EXT VEINS COMPL BI   ORDERED BY:   ADRIEN DONOHUE     PROCEDURE DATE:  05/24/2025          INTERPRETATION:  CLINICAL INFORMATION: Bilateral lower extremity swelling.    COMPARISON: April 26, 2025.    TECHNIQUE: Duplex sonography of the BILATERAL LOWER extremity veins with   color and spectral Doppler, with and without compression.    FINDINGS:    RIGHT:  Normal compressibility of the RIGHT common femoral, femoral and popliteal   veins.  Doppler examination shows normal spontaneous and phasic flow.  No RIGHT calf vein thrombosis is detected.    LEFT:  Normal compressibility of the LEFT common femoral, femoral and popliteal   veins.  Acute deep venous thrombosis in the left gastrocnemius vein.    IMPRESSION:  Acute deep venous thrombosis: below the knee.    Acute deep venous thrombosis in the left gastrocnemius vein.    Findings were discussed with PERI DONOHUE on 5/24/2025 at 3:49 PM by Dr. Callejas with read back confirmation.    --- End of Report ---            MELINDA CALLEJAS MD; Attending Radiologist  This document has been electronically signed. May 24 2025  3:50PM    < end of copied text >    ASSESSMENT:  79yoM with PMHx CAD s/p PCI (2012) on ASA, bipolar, GERD, recent R hip fracture with ORIF in SYS 4/21 was at [facility] until 5/10, on lovenox until that point, now c/o BLE edema extending from pelvis to foot, with 2-3 day hx of slight erythema to the L forefoot. Patient previously tx'd with lasix from cardiologist office without alleviation. VSSAF, exam with BLE edema, L forefoot with erythema, cool to touch, palpable L DP and PT, palp L femoral. Imaging significant for act DVT of L gastrocneumius.    PLAN:  - No acute vascular surgery intervention indicated at this time  - A/c per medicine  - rest of care per medicine    To discuss with Dr. Sood.

## 2025-05-26 ENCOUNTER — TRANSCRIPTION ENCOUNTER (OUTPATIENT)
Age: 80
End: 2025-05-26

## 2025-05-26 LAB
ALBUMIN SERPL ELPH-MCNC: 2.3 G/DL — LOW (ref 3.3–5)
ALP SERPL-CCNC: 244 U/L — HIGH (ref 40–120)
ALT FLD-CCNC: 19 U/L — SIGNIFICANT CHANGE UP (ref 12–78)
ANION GAP SERPL CALC-SCNC: 8 MMOL/L — SIGNIFICANT CHANGE UP (ref 5–17)
AST SERPL-CCNC: 18 U/L — SIGNIFICANT CHANGE UP (ref 15–37)
BILIRUB SERPL-MCNC: 0.6 MG/DL — SIGNIFICANT CHANGE UP (ref 0.2–1.2)
BUN SERPL-MCNC: 15 MG/DL — SIGNIFICANT CHANGE UP (ref 7–23)
CALCIUM SERPL-MCNC: 8.5 MG/DL — SIGNIFICANT CHANGE UP (ref 8.5–10.1)
CHLORIDE SERPL-SCNC: 101 MMOL/L — SIGNIFICANT CHANGE UP (ref 96–108)
CO2 SERPL-SCNC: 30 MMOL/L — SIGNIFICANT CHANGE UP (ref 22–31)
CREAT SERPL-MCNC: 0.64 MG/DL — SIGNIFICANT CHANGE UP (ref 0.5–1.3)
EGFR: 95 ML/MIN/1.73M2 — SIGNIFICANT CHANGE UP
EGFR: 95 ML/MIN/1.73M2 — SIGNIFICANT CHANGE UP
GLUCOSE SERPL-MCNC: 85 MG/DL — SIGNIFICANT CHANGE UP (ref 70–99)
HCT VFR BLD CALC: 34.9 % — LOW (ref 39–50)
HGB BLD-MCNC: 10.9 G/DL — LOW (ref 13–17)
MAGNESIUM SERPL-MCNC: 2.2 MG/DL — SIGNIFICANT CHANGE UP (ref 1.6–2.6)
MCHC RBC-ENTMCNC: 29.9 PG — SIGNIFICANT CHANGE UP (ref 27–34)
MCHC RBC-ENTMCNC: 31.2 G/DL — LOW (ref 32–36)
MCV RBC AUTO: 95.9 FL — SIGNIFICANT CHANGE UP (ref 80–100)
NRBC BLD AUTO-RTO: 0 /100 WBCS — SIGNIFICANT CHANGE UP (ref 0–0)
PLATELET # BLD AUTO: 283 K/UL — SIGNIFICANT CHANGE UP (ref 150–400)
POTASSIUM SERPL-MCNC: 3.4 MMOL/L — LOW (ref 3.5–5.3)
POTASSIUM SERPL-SCNC: 3.4 MMOL/L — LOW (ref 3.5–5.3)
PROT SERPL-MCNC: 5.6 G/DL — LOW (ref 6–8.3)
RBC # BLD: 3.64 M/UL — LOW (ref 4.2–5.8)
RBC # FLD: 17 % — HIGH (ref 10.3–14.5)
SODIUM SERPL-SCNC: 139 MMOL/L — SIGNIFICANT CHANGE UP (ref 135–145)
WBC # BLD: 6.96 K/UL — SIGNIFICANT CHANGE UP (ref 3.8–10.5)
WBC # FLD AUTO: 6.96 K/UL — SIGNIFICANT CHANGE UP (ref 3.8–10.5)

## 2025-05-26 RX ORDER — SALINE 7; 19 G/118ML; G/118ML
1 ENEMA RECTAL ONCE
Refills: 0 | Status: COMPLETED | OUTPATIENT
Start: 2025-05-26 | End: 2025-05-26

## 2025-05-26 RX ORDER — APIXABAN 2.5 MG/1
10 TABLET, FILM COATED ORAL EVERY 12 HOURS
Refills: 0 | Status: DISCONTINUED | OUTPATIENT
Start: 2025-05-26 | End: 2025-05-29

## 2025-05-26 RX ORDER — LACTULOSE 10 G/15ML
10 SOLUTION ORAL
Refills: 0 | Status: DISCONTINUED | OUTPATIENT
Start: 2025-05-26 | End: 2025-05-29

## 2025-05-26 RX ADMIN — Medication 2 CAPSULE(S): at 08:06

## 2025-05-26 RX ADMIN — Medication 20 MILLIGRAM(S): at 11:30

## 2025-05-26 RX ADMIN — Medication 81 MILLIGRAM(S): at 11:31

## 2025-05-26 RX ADMIN — LACTULOSE 10 GRAM(S): 10 SOLUTION ORAL at 17:13

## 2025-05-26 RX ADMIN — Medication 2 CAPSULE(S): at 17:12

## 2025-05-26 RX ADMIN — ATORVASTATIN CALCIUM 20 MILLIGRAM(S): 80 TABLET, FILM COATED ORAL at 21:16

## 2025-05-26 RX ADMIN — ENOXAPARIN SODIUM 70 MILLIGRAM(S): 100 INJECTION SUBCUTANEOUS at 06:04

## 2025-05-26 RX ADMIN — Medication 2 CAPSULE(S): at 11:31

## 2025-05-26 RX ADMIN — APIXABAN 10 MILLIGRAM(S): 2.5 TABLET, FILM COATED ORAL at 17:12

## 2025-05-26 RX ADMIN — FUROSEMIDE 20 MILLIGRAM(S): 10 INJECTION INTRAMUSCULAR; INTRAVENOUS at 06:04

## 2025-05-26 NOTE — PATIENT CHOICE NOTE. - NSPTCHOICENOTES_GEN_ALL_CORE
D/C resource folder provided at bedside which includes lists for both rehab facilities and home care agencies and transportation letter advising on ambulance and ambulette transportation. CM reviewed folder during assessment.  D/C resource folder provided at bedside which includes lists for both rehab facilities and home care agencies and transportation letter advising on ambulance and ambulette transportation. CM reviewed folder during assessment. Pt is currently enrolled with [facility] services through [facility] [phone] and would like to continue D/C resource folder provided at bedside which includes lists for both rehab facilities and home care agencies and transportation letter advising on ambulance and ambulette transportation. CM reviewed folder during assessment. Pt is currently enrolled with [facility] services through [facility] [phone] and would like to continue if discharge plan is for home.

## 2025-05-26 NOTE — DISCHARGE NOTE NURSING/CASE MANAGEMENT/SOCIAL WORK - PATIENT PORTAL LINK FT
You can access the FollowMyHealth Patient Portal offered by [facility] by registering at the following website: [email or web address]. By joining [facility]’s FollowMyHealth portal, you will also be able to view your health information using other applications (apps) compatible with our system.

## 2025-05-26 NOTE — CASE MANAGEMENT PROGRESS NOTE - NSCMPROGRESSNOTE_GEN_ALL_CORE
Pt remains acute, awaiting further testing, on IV Lasix, pending TTE, pending PT eval, pending nutrition consult. CM will continue to collaborate with interdisciplinary team and remain available to assist.

## 2025-05-26 NOTE — CARE COORDINATION ASSESSMENT. - PRO ARRIVE FROM
CM met w pt. Pt provided verbal consent to speak with miguel spouse cell [phone] or home [phone]. Pt had recent right hip surgery for hip fx on 4/20 at [facility], then went to [place] [facility]. Pt was DC home with [facility] [phone] for visiting nurse, HHA, and PT on 5/10.25 Pt wife states he had an aide from [facility] [phone] 2 hours/ 3 days weekly. Pt came to [facility] c/o worsening bilateral lower extremity swelling. Per pt lives with spouse in a house with 2 stairs to enter home, 3 steps inside. Pt was receiving assistance w ADL, ambulation, driving and med management prior to admission. Pt daughter drives to appointments, uses [facility] [phone] and wants to continue with / utilize services after discharge if necessary. Pt family requesting MALA from home health aide upon discharge. CM discussed home care services and the intermittent short term nature of service. CM discussed private hire aides and reviewed previously provided D/C resource folder which includes lists for both rehab facilities and home care agencies. Pt and family agreeable to services pending recommendations. Pt has walker, wheelchair. CM verified: PCP: Dr. Okeefe  Pharmacy: [facility]. : stated no. Pt sposue stated she will  when medically safe for discharge./home

## 2025-05-26 NOTE — CARE COORDINATION ASSESSMENT. - NSCAREPROVIDERS_GEN_ALL_CORE_FT
CARE PROVIDERS:  Accepting Physician: Gurinder Werner  Admitting: Gurinder Werner  Attending: Gurinder Werner  Case Management: Job Anderson  Consultant: Haile Vivas  Consultant: Tigist Farooq  Consultant: Germaine Mcelroy  Consultant: Triston Boykin  Consultant: Khan, Fahrina  Consultant: Roxann Fajardo  Covering Team: Sherwin Berg  ED ACP: Brittney Richards  ED Attending: Jama Morrissey ED Nurse: Mellissa Thornton  HIM/Billing & Coding: Lise Henderson  Nurse: Geovanna Conrad  Ordered: ServiceAccount, SCMMLM  Ordered: ADM, User  Outpatient Provider: Derrick Ayoub  Outpatient Provider: Gurinder Werner  Override: Kei Jones  Override: Geovanna Conrad  PCA/Nursing Assistant: Lexie Aldana  PCA/Nursing Assistant: Nichelle Castro  Registered Dietitian: Glo Whitlock// Supp. Assoc.: Pricila Christianson

## 2025-05-26 NOTE — DISCHARGE NOTE NURSING/CASE MANAGEMENT/SOCIAL WORK - NSDCPEFALRISK_GEN_ALL_CORE
For information on Fall & Injury Prevention, visit: [email or web address] OR  [email or web address] OR  https://www.cdc.gov/steadi/patient.html

## 2025-05-26 NOTE — CARE COORDINATION ASSESSMENT. - OTHER PERTINENT DISCHARGE PLANNING INFORMATION:
Met patient at bedside.  Explained role of CM, pt / spouse verbalized understanding. Pt /spouse was made aware a CM will remain available through hospitalization.  Contact information given in discharge/ transitions resource folder.

## 2025-05-26 NOTE — DISCHARGE NOTE NURSING/CASE MANAGEMENT/SOCIAL WORK - NSSCCARECORD_GEN_ALL_CORE
Home Care Agency/Durable Medical Equipment Agency Home Care Agency/Durable Medical Equipment Agency/[facility]

## 2025-05-27 ENCOUNTER — RESULT REVIEW (OUTPATIENT)
Age: 80
End: 2025-05-27

## 2025-05-27 LAB
ALBUMIN SERPL ELPH-MCNC: 2.4 G/DL — LOW (ref 3.3–5)
ALP SERPL-CCNC: 260 U/L — HIGH (ref 40–120)
ALT FLD-CCNC: 19 U/L — SIGNIFICANT CHANGE UP (ref 12–78)
ANION GAP SERPL CALC-SCNC: 6 MMOL/L — SIGNIFICANT CHANGE UP (ref 5–17)
AST SERPL-CCNC: 17 U/L — SIGNIFICANT CHANGE UP (ref 15–37)
BILIRUB SERPL-MCNC: 0.6 MG/DL — SIGNIFICANT CHANGE UP (ref 0.2–1.2)
BUN SERPL-MCNC: 12 MG/DL — SIGNIFICANT CHANGE UP (ref 7–23)
CALCIUM SERPL-MCNC: 8.9 MG/DL — SIGNIFICANT CHANGE UP (ref 8.5–10.1)
CHLORIDE SERPL-SCNC: 100 MMOL/L — SIGNIFICANT CHANGE UP (ref 96–108)
CO2 SERPL-SCNC: 33 MMOL/L — HIGH (ref 22–31)
CREAT SERPL-MCNC: 0.7 MG/DL — SIGNIFICANT CHANGE UP (ref 0.5–1.3)
EGFR: 93 ML/MIN/1.73M2 — SIGNIFICANT CHANGE UP
EGFR: 93 ML/MIN/1.73M2 — SIGNIFICANT CHANGE UP
GLUCOSE SERPL-MCNC: 88 MG/DL — SIGNIFICANT CHANGE UP (ref 70–99)
HCT VFR BLD CALC: 38.6 % — LOW (ref 39–50)
HGB BLD-MCNC: 12.1 G/DL — LOW (ref 13–17)
MAGNESIUM SERPL-MCNC: 2.1 MG/DL — SIGNIFICANT CHANGE UP (ref 1.6–2.6)
MCHC RBC-ENTMCNC: 30 PG — SIGNIFICANT CHANGE UP (ref 27–34)
MCHC RBC-ENTMCNC: 31.3 G/DL — LOW (ref 32–36)
MCV RBC AUTO: 95.8 FL — SIGNIFICANT CHANGE UP (ref 80–100)
NRBC BLD AUTO-RTO: 0 /100 WBCS — SIGNIFICANT CHANGE UP (ref 0–0)
PLATELET # BLD AUTO: 295 K/UL — SIGNIFICANT CHANGE UP (ref 150–400)
POTASSIUM SERPL-MCNC: 3.7 MMOL/L — SIGNIFICANT CHANGE UP (ref 3.5–5.3)
POTASSIUM SERPL-SCNC: 3.7 MMOL/L — SIGNIFICANT CHANGE UP (ref 3.5–5.3)
PROT SERPL-MCNC: 6.1 G/DL — SIGNIFICANT CHANGE UP (ref 6–8.3)
RBC # BLD: 4.03 M/UL — LOW (ref 4.2–5.8)
RBC # FLD: 16.8 % — HIGH (ref 10.3–14.5)
SODIUM SERPL-SCNC: 139 MMOL/L — SIGNIFICANT CHANGE UP (ref 135–145)
WBC # BLD: 7.13 K/UL — SIGNIFICANT CHANGE UP (ref 3.8–10.5)
WBC # FLD AUTO: 7.13 K/UL — SIGNIFICANT CHANGE UP (ref 3.8–10.5)

## 2025-05-27 PROCEDURE — 71045 X-RAY EXAM CHEST 1 VIEW: CPT | Mod: 26

## 2025-05-27 RX ORDER — FUROSEMIDE 10 MG/ML
20 INJECTION INTRAMUSCULAR; INTRAVENOUS DAILY
Refills: 0 | Status: DISCONTINUED | OUTPATIENT
Start: 2025-05-28 | End: 2025-05-29

## 2025-05-27 RX ADMIN — LACTULOSE 10 GRAM(S): 10 SOLUTION ORAL at 17:35

## 2025-05-27 RX ADMIN — Medication 2 CAPSULE(S): at 17:35

## 2025-05-27 RX ADMIN — FUROSEMIDE 20 MILLIGRAM(S): 10 INJECTION INTRAMUSCULAR; INTRAVENOUS at 05:28

## 2025-05-27 RX ADMIN — APIXABAN 10 MILLIGRAM(S): 2.5 TABLET, FILM COATED ORAL at 17:34

## 2025-05-27 RX ADMIN — LACTULOSE 10 GRAM(S): 10 SOLUTION ORAL at 05:27

## 2025-05-27 RX ADMIN — Medication 2 CAPSULE(S): at 08:09

## 2025-05-27 RX ADMIN — Medication 2 CAPSULE(S): at 12:02

## 2025-05-27 RX ADMIN — ATORVASTATIN CALCIUM 20 MILLIGRAM(S): 80 TABLET, FILM COATED ORAL at 21:36

## 2025-05-27 RX ADMIN — APIXABAN 10 MILLIGRAM(S): 2.5 TABLET, FILM COATED ORAL at 05:27

## 2025-05-27 RX ADMIN — Medication 20 MILLIGRAM(S): at 11:06

## 2025-05-27 RX ADMIN — Medication 81 MILLIGRAM(S): at 11:06

## 2025-05-27 NOTE — DIETITIAN INITIAL EVALUATION ADULT - ORAL NUTRITION SUPPLEMENTS
recommend ensure plus HP daily on full liquids left message for MD to activate 350kcals and 20 gms protein per serving

## 2025-05-27 NOTE — DIETITIAN INITIAL EVALUATION ADULT - ORAL INTAKE PTA/DIET HISTORY
patient reports with good PO on clear liquids this AM . reports tries to avoid salt at home but some salt added to foods. lives with wife patient shares cooking with spouse. no recent weight change. no difficulties chewing swallowing has own teeth. no food allergies

## 2025-05-27 NOTE — DIETITIAN INITIAL EVALUATION ADULT - OTHER INFO
History of Present Illness:   79m with CAD, HLD, PUD with partial gastrectomy, chronic pancreatic insufficiency, bipolar disorder, recent right hip surgery for hip fx on 4/20 at [facility], [facility] on [facility]/10)  presents to the ED c/o worsening bilateral lower extremity swelling x 1-2 weeks. Patient's wife spoke to his cardiologist who prescribed lasix 20 mg. Patient weighted 145 lb prior to surgery, most recently 157 lb. patient  with swelling to bilateral legs, scrotum and abdomen. Patient did lose 3 lbs after starting lasix on Thursday.   weight now at admit 153# # per patient + 2 left and right leg edema   5/27 fecal incontinence   K now WNL  a1c 6.0% explained verbally for patient to avoid concentrated sweets

## 2025-05-27 NOTE — DIETITIAN INITIAL EVALUATION ADULT - ADD RECOMMEND
1. trend K level 2. trend blood sugars no hx DM 3. advance to dash tlc diet as appropriate  1. trend K level 2. trend blood sugars no hx DM 3. advance to dash tlc diet as appropriate 4. recommend MVI

## 2025-05-27 NOTE — DIETITIAN INITIAL EVALUATION ADULT - PERTINENT MEDS FT
MEDICATIONS  (STANDING):  apixaban 10 milliGRAM(s) Oral every 12 hours  aspirin enteric coated 81 milliGRAM(s) Oral daily  atorvastatin 20 milliGRAM(s) Oral at bedtime  famotidine    Tablet 20 milliGRAM(s) Oral daily  lactulose Syrup 10 Gram(s) Oral two times a day  pancrelipase  (CREON 36,000 Lipase Units) 2 Capsule(s) Oral three times a day with meals    MEDICATIONS  (PRN):  acetaminophen     Tablet .. 650 milliGRAM(s) Oral every 6 hours PRN Temp greater or equal to 38C (100.4F), Mild Pain (1 - 3)  aluminum hydroxide/magnesium hydroxide/simethicone Suspension 30 milliLiter(s) Oral every 4 hours PRN Dyspepsia  melatonin 3 milliGRAM(s) Oral at bedtime PRN Insomnia  ondansetron Injectable 4 milliGRAM(s) IV Push every 8 hours PRN Nausea and/or Vomiting

## 2025-05-27 NOTE — DIETITIAN INITIAL EVALUATION ADULT - PERTINENT LABORATORY DATA
05-27    139  |  100  |  12  ----------------------------<  88  3.7   |  33[H]  |  0.70    Ca    8.9      27 May 2025 06:55  Mg     2.1     05-27    TPro  6.1  /  Alb  2.4[L]  /  TBili  0.6  /  DBili  x   /  AST  17  /  ALT  19  /  AlkPhos  260[H]  05-27  A1C with Estimated Average Glucose Result: 6.0 % (04-22-25 @ 06:00)

## 2025-05-27 NOTE — PHYSICAL THERAPY INITIAL EVALUATION ADULT - PERTINENT HX OF CURRENT PROBLEM, REHAB EVAL
79m with CAD, HLD, PUD with partial gastrectomy, chronic pancreatic insufficiency, bipolar disorder, recent right hip surgery for hip fx on 4/20 at  [facility], then went to [place] rehab (ID'd home on 5/10)  presents to the ED c/o worsening bilateral lower extremity swelling x 1-2 weeks. Patient's wife spoke to his cardiologist who prescribed Lasix 20 mg. Patient weighted 145 lb prior to surgery, most recently 157 lb. Patient with swelling to bilateral legs, scrotum and abdomen. Patient did lose 3 lbs after starting Lasix on Thursday. Patient scheduled for outpatient echo this week. Denies sob at rest or exertion. Denies fever, chills, chest pain, sob, abd pain, N/V, syncope, UE/LE weakness or paresthesias

## 2025-05-27 NOTE — DIETITIAN INITIAL EVALUATION ADULT - NS FNS DIET ORDER
Diet, Regular:   DASH/TLC {Sodium & Cholesterol Restricted} (05-27-25 @ 09:14)  Diet, Full Liquid (05-27-25 @ 09:14)

## 2025-05-27 NOTE — DIETITIAN NUTRITION RISK NOTIFICATION - TREATMENT: THE FOLLOWING DIET HAS BEEN RECOMMENDED
Diet, Regular:   DASH/TLC {Sodium & Cholesterol Restricted} (05-27-25 @ 09:14) [Available for Activation]

## 2025-05-28 LAB
ALBUMIN SERPL ELPH-MCNC: 2.2 G/DL — LOW (ref 3.3–5)
ALP SERPL-CCNC: 232 U/L — HIGH (ref 40–120)
ALT FLD-CCNC: 17 U/L — SIGNIFICANT CHANGE UP (ref 12–78)
ANION GAP SERPL CALC-SCNC: 5 MMOL/L — SIGNIFICANT CHANGE UP (ref 5–17)
AST SERPL-CCNC: 17 U/L — SIGNIFICANT CHANGE UP (ref 15–37)
BILIRUB SERPL-MCNC: 0.5 MG/DL — SIGNIFICANT CHANGE UP (ref 0.2–1.2)
BUN SERPL-MCNC: 14 MG/DL — SIGNIFICANT CHANGE UP (ref 7–23)
CALCIUM SERPL-MCNC: 8.6 MG/DL — SIGNIFICANT CHANGE UP (ref 8.5–10.1)
CHLORIDE SERPL-SCNC: 101 MMOL/L — SIGNIFICANT CHANGE UP (ref 96–108)
CO2 SERPL-SCNC: 32 MMOL/L — HIGH (ref 22–31)
CREAT SERPL-MCNC: 0.65 MG/DL — SIGNIFICANT CHANGE UP (ref 0.5–1.3)
EGFR: 95 ML/MIN/1.73M2 — SIGNIFICANT CHANGE UP
EGFR: 95 ML/MIN/1.73M2 — SIGNIFICANT CHANGE UP
GLUCOSE SERPL-MCNC: 86 MG/DL — SIGNIFICANT CHANGE UP (ref 70–99)
HCT VFR BLD CALC: 33.8 % — LOW (ref 39–50)
HGB BLD-MCNC: 10.9 G/DL — LOW (ref 13–17)
MAGNESIUM SERPL-MCNC: 2.1 MG/DL — SIGNIFICANT CHANGE UP (ref 1.6–2.6)
MCHC RBC-ENTMCNC: 30 PG — SIGNIFICANT CHANGE UP (ref 27–34)
MCHC RBC-ENTMCNC: 32.2 G/DL — SIGNIFICANT CHANGE UP (ref 32–36)
MCV RBC AUTO: 93.1 FL — SIGNIFICANT CHANGE UP (ref 80–100)
NRBC BLD AUTO-RTO: 0 /100 WBCS — SIGNIFICANT CHANGE UP (ref 0–0)
PLATELET # BLD AUTO: 270 K/UL — SIGNIFICANT CHANGE UP (ref 150–400)
POTASSIUM SERPL-MCNC: 3.4 MMOL/L — LOW (ref 3.5–5.3)
POTASSIUM SERPL-SCNC: 3.4 MMOL/L — LOW (ref 3.5–5.3)
PROT SERPL-MCNC: 5.5 G/DL — LOW (ref 6–8.3)
RBC # BLD: 3.63 M/UL — LOW (ref 4.2–5.8)
RBC # FLD: 16.5 % — HIGH (ref 10.3–14.5)
SODIUM SERPL-SCNC: 138 MMOL/L — SIGNIFICANT CHANGE UP (ref 135–145)
WBC # BLD: 7.17 K/UL — SIGNIFICANT CHANGE UP (ref 3.8–10.5)
WBC # FLD AUTO: 7.17 K/UL — SIGNIFICANT CHANGE UP (ref 3.8–10.5)

## 2025-05-28 RX ADMIN — LACTULOSE 10 GRAM(S): 10 SOLUTION ORAL at 05:23

## 2025-05-28 RX ADMIN — APIXABAN 10 MILLIGRAM(S): 2.5 TABLET, FILM COATED ORAL at 05:23

## 2025-05-28 RX ADMIN — LACTULOSE 10 GRAM(S): 10 SOLUTION ORAL at 17:13

## 2025-05-28 RX ADMIN — Medication 81 MILLIGRAM(S): at 11:18

## 2025-05-28 RX ADMIN — Medication 2 CAPSULE(S): at 12:12

## 2025-05-28 RX ADMIN — Medication 20 MILLIGRAM(S): at 11:18

## 2025-05-28 RX ADMIN — ATORVASTATIN CALCIUM 20 MILLIGRAM(S): 80 TABLET, FILM COATED ORAL at 21:29

## 2025-05-28 RX ADMIN — FUROSEMIDE 20 MILLIGRAM(S): 10 INJECTION INTRAMUSCULAR; INTRAVENOUS at 05:23

## 2025-05-28 RX ADMIN — Medication 2 CAPSULE(S): at 07:34

## 2025-05-28 RX ADMIN — Medication 2 CAPSULE(S): at 17:14

## 2025-05-28 RX ADMIN — APIXABAN 10 MILLIGRAM(S): 2.5 TABLET, FILM COATED ORAL at 17:14

## 2025-05-28 NOTE — SOCIAL WORK PROGRESS NOTE - NSSWPROGRESSNOTE_GEN_ALL_CORE
Per discussion  w/ inpatient interdisciplinary treatment team this AM, patient is not yet medically cleared for transition to next level of care, however recommended discharge plan is for sub-acute rehab facility placement.  met w/ patient @ bedside on [facility] [address] for discussion of sub-acute rehab, @ which time he confirmed to be agreeable but deferred to his wife Anna for further planning. As such,  spoke w/ patient's wife, Mrs. Anna Floyd, @ [phone] who also confirmed to be understanding of and agreeable to recommendation for sub-acute rehab. She requested sub-acute rehab referral be sent to facilities local to her & patient's home address in [place], so referral was sent to the [facility] in [place], [facility] in [place], [facility] in [place], and [facility] in [place].  left discharge planning resource folder (which includes choice lists for sub-acute rehab facilities in [place] & [facility]) @ bedside for her to  when she comes to visit patient later on. Referral sent accordingly, currently awaiting responses regarding admission decisions. Of note, patient's primary health insurance is Medicare, so no pre-authorization required, and patient has already met Medicare's 3-night hospital stay requirement for sub-acute rehab facility placement.

## 2025-05-28 NOTE — CAREGIVER ENGAGEMENT NOTE - CAREGIVER EDUCATION NOTES - FREE TEXT
Per discussion  w/ inpatient interdisciplinary treatment team this AM, patient is not yet medically cleared for transition to next level of care, however recommended discharge plan is for sub-acute rehab facility placement.  met w/ patient @ bedside on [address] for discussion of sub-acute rehab, @ which time he confirmed to be agreeable but deferred to his wife Anna for further planning. As such,  spoke w/ patient's wife, Mrs. Anna Floyd, @ [phone] who also confirmed to be understanding of and agreeable to recommendation for sub-acute rehab. She requested sub-acute rehab referral be sent to facilities local to her & patient's home address in [place], so referral was sent to the [facility] in [place], [facility] in [place], [facility] in [place], and [facility] in [place].  left discharge planning resource folder (which includes choice lists for sub-acute rehab facilities in [place] & [facility]) @ bedside for her to  when she comes to visit patient later on. Referral sent accordingly, currently awaiting responses regarding admission decisions. Of note, patient's primary health insurance is Medicare, so no pre-authorization required, and patient has already met Medicare's 3-night hospital stay requirement for sub-acute rehab facility placement.

## 2025-05-28 NOTE — CAREGIVER ENGAGEMENT NOTE - CAREGIVER EDUCATION - TYPES DISCUSSED
Acute rehab/After-care skilled tasks/Discharge plan/DME/Home Care Services/Insurance benefits/Post-acute care agency contact/Post-discharge escalation process/SNF placement process/Transportation coordination/Transportation letter provided

## 2025-05-28 NOTE — PROGRESS NOTE ADULT - NUTRITIONAL ASSESSMENT
This patient has been assessed with a concern for Malnutrition and has been determined to have a diagnosis/diagnoses of Moderate protein-calorie malnutrition.    This patient is being managed with:   Diet Full Liquid-  Supplement Feeding Modality:  Oral  Ensure Plus High Protein Cans or Servings Per Day:  1       Frequency:  Daily  Entered: May 27 2025 10:50AM  
This patient has been assessed with a concern for Malnutrition and has been determined to have a diagnosis/diagnoses of Moderate protein-calorie malnutrition.    This patient is being managed with:   Diet Full Liquid-  Supplement Feeding Modality:  Oral  Ensure Plus High Protein Cans or Servings Per Day:  1       Frequency:  Daily  Entered: May 27 2025 10:50AM

## 2025-05-29 ENCOUNTER — TRANSCRIPTION ENCOUNTER (OUTPATIENT)
Age: 80
End: 2025-05-29

## 2025-05-29 VITALS
SYSTOLIC BLOOD PRESSURE: 135 MMHG | HEART RATE: 83 BPM | TEMPERATURE: 98 F | OXYGEN SATURATION: 99 % | RESPIRATION RATE: 18 BRPM | DIASTOLIC BLOOD PRESSURE: 82 MMHG

## 2025-05-29 LAB
ANION GAP SERPL CALC-SCNC: 8 MMOL/L — SIGNIFICANT CHANGE UP (ref 5–17)
BUN SERPL-MCNC: 16 MG/DL — SIGNIFICANT CHANGE UP (ref 7–23)
CALCIUM SERPL-MCNC: 9 MG/DL — SIGNIFICANT CHANGE UP (ref 8.5–10.1)
CHLORIDE SERPL-SCNC: 101 MMOL/L — SIGNIFICANT CHANGE UP (ref 96–108)
CO2 SERPL-SCNC: 28 MMOL/L — SIGNIFICANT CHANGE UP (ref 22–31)
CREAT SERPL-MCNC: 0.81 MG/DL — SIGNIFICANT CHANGE UP (ref 0.5–1.3)
EGFR: 89 ML/MIN/1.73M2 — SIGNIFICANT CHANGE UP
EGFR: 89 ML/MIN/1.73M2 — SIGNIFICANT CHANGE UP
GLUCOSE SERPL-MCNC: 141 MG/DL — HIGH (ref 70–99)
HCT VFR BLD CALC: 36.9 % — LOW (ref 39–50)
HGB BLD-MCNC: 12 G/DL — LOW (ref 13–17)
MAGNESIUM SERPL-MCNC: 2 MG/DL — SIGNIFICANT CHANGE UP (ref 1.6–2.6)
MCHC RBC-ENTMCNC: 30.5 PG — SIGNIFICANT CHANGE UP (ref 27–34)
MCHC RBC-ENTMCNC: 32.5 G/DL — SIGNIFICANT CHANGE UP (ref 32–36)
MCV RBC AUTO: 93.7 FL — SIGNIFICANT CHANGE UP (ref 80–100)
NRBC BLD AUTO-RTO: 0 /100 WBCS — SIGNIFICANT CHANGE UP (ref 0–0)
PLATELET # BLD AUTO: 300 K/UL — SIGNIFICANT CHANGE UP (ref 150–400)
POTASSIUM SERPL-MCNC: 4 MMOL/L — SIGNIFICANT CHANGE UP (ref 3.5–5.3)
POTASSIUM SERPL-SCNC: 4 MMOL/L — SIGNIFICANT CHANGE UP (ref 3.5–5.3)
RBC # BLD: 3.94 M/UL — LOW (ref 4.2–5.8)
RBC # FLD: 16.1 % — HIGH (ref 10.3–14.5)
SODIUM SERPL-SCNC: 137 MMOL/L — SIGNIFICANT CHANGE UP (ref 135–145)
WBC # BLD: 6.79 K/UL — SIGNIFICANT CHANGE UP (ref 3.8–10.5)
WBC # FLD AUTO: 6.79 K/UL — SIGNIFICANT CHANGE UP (ref 3.8–10.5)

## 2025-05-29 PROCEDURE — 36415 COLL VENOUS BLD VENIPUNCTURE: CPT

## 2025-05-29 PROCEDURE — 85025 COMPLETE CBC W/AUTO DIFF WBC: CPT

## 2025-05-29 PROCEDURE — 83880 ASSAY OF NATRIURETIC PEPTIDE: CPT

## 2025-05-29 PROCEDURE — 93005 ELECTROCARDIOGRAM TRACING: CPT

## 2025-05-29 PROCEDURE — 99285 EMERGENCY DEPT VISIT HI MDM: CPT

## 2025-05-29 PROCEDURE — 83735 ASSAY OF MAGNESIUM: CPT

## 2025-05-29 PROCEDURE — 85730 THROMBOPLASTIN TIME PARTIAL: CPT

## 2025-05-29 PROCEDURE — 97116 GAIT TRAINING THERAPY: CPT

## 2025-05-29 PROCEDURE — 76700 US EXAM ABDOM COMPLETE: CPT

## 2025-05-29 PROCEDURE — 74177 CT ABD & PELVIS W/CONTRAST: CPT

## 2025-05-29 PROCEDURE — 80074 ACUTE HEPATITIS PANEL: CPT

## 2025-05-29 PROCEDURE — 85027 COMPLETE CBC AUTOMATED: CPT

## 2025-05-29 PROCEDURE — 93970 EXTREMITY STUDY: CPT

## 2025-05-29 PROCEDURE — 97112 NEUROMUSCULAR REEDUCATION: CPT

## 2025-05-29 PROCEDURE — 85610 PROTHROMBIN TIME: CPT

## 2025-05-29 PROCEDURE — 93306 TTE W/DOPPLER COMPLETE: CPT

## 2025-05-29 PROCEDURE — 71275 CT ANGIOGRAPHY CHEST: CPT

## 2025-05-29 PROCEDURE — 82977 ASSAY OF GGT: CPT

## 2025-05-29 PROCEDURE — 71045 X-RAY EXAM CHEST 1 VIEW: CPT

## 2025-05-29 PROCEDURE — 80048 BASIC METABOLIC PNL TOTAL CA: CPT

## 2025-05-29 PROCEDURE — 97162 PT EVAL MOD COMPLEX 30 MIN: CPT

## 2025-05-29 PROCEDURE — 97110 THERAPEUTIC EXERCISES: CPT

## 2025-05-29 PROCEDURE — 80053 COMPREHEN METABOLIC PANEL: CPT

## 2025-05-29 RX ORDER — FUROSEMIDE 10 MG/ML
1 INJECTION INTRAMUSCULAR; INTRAVENOUS
Qty: 0 | Refills: 0 | DISCHARGE
Start: 2025-05-29

## 2025-05-29 RX ORDER — MELATONIN 5 MG
1 TABLET ORAL
Qty: 0 | Refills: 0 | DISCHARGE
Start: 2025-05-29

## 2025-05-29 RX ORDER — LACTULOSE 10 G/15ML
15 SOLUTION ORAL
Qty: 0 | Refills: 0 | DISCHARGE
Start: 2025-05-29

## 2025-05-29 RX ORDER — LIPASE/PROTEASE/AMYLASE 10K-37.5K
2 CAPSULE,DELAYED RELEASE (ENTERIC COATED) ORAL
Qty: 0 | Refills: 0 | DISCHARGE
Start: 2025-05-29

## 2025-05-29 RX ORDER — APIXABAN 2.5 MG/1
2 TABLET, FILM COATED ORAL
Qty: 0 | Refills: 0 | DISCHARGE
Start: 2025-05-29

## 2025-05-29 RX ADMIN — FUROSEMIDE 20 MILLIGRAM(S): 10 INJECTION INTRAMUSCULAR; INTRAVENOUS at 05:48

## 2025-05-29 RX ADMIN — Medication 20 MILLIGRAM(S): at 12:09

## 2025-05-29 RX ADMIN — APIXABAN 10 MILLIGRAM(S): 2.5 TABLET, FILM COATED ORAL at 05:48

## 2025-05-29 RX ADMIN — LACTULOSE 10 GRAM(S): 10 SOLUTION ORAL at 05:48

## 2025-05-29 RX ADMIN — Medication 2 CAPSULE(S): at 08:10

## 2025-05-29 RX ADMIN — Medication 81 MILLIGRAM(S): at 12:09

## 2025-05-29 RX ADMIN — Medication 2 CAPSULE(S): at 12:09

## 2025-05-29 NOTE — DISCHARGE NOTE PROVIDER - NSDCCPCAREPLAN_GEN_ALL_CORE_FT
PRINCIPAL DISCHARGE DIAGNOSIS  Diagnosis: Peripheral edema  Assessment and Plan of Treatment: p/w edema, below the knee DVT  DVT  likely provoked from recent hip surgery  continue eliquis  LE Edema/abdominal and scrotal edema  likely dt  hypoalbuminemia-third spacing   - Echo with normal LV systolic function, no significant valve issues  - CTA chest with small right and trace left pleural effusions  seen by cardiology, cont lasix 20qd  #distended esophagus/stomach on CT- asymptomatic  GI following  tolerating po well  bowel regimen for constipation  chronic pancreatic insufficiency  due to remote stomach surgery  continue creon  LABS:                        12.0   6.79  )-----------( 300      ( 29 May 2025 08:30 )             36.9   05-29  137  |  101  |  16  ----------------------------<  141[H]  4.0   |  28  |  0.81  Ca    9.0      29 May 2025 08:30  Mg     2.0     05-29  TPro  5.5[L]  /  Alb  2.2[L]  /  TBili  0.5  /  DBili  x   /  AST  17  /  ALT  17  /  AlkPhos  232[H]  05-28      SECONDARY DISCHARGE DIAGNOSES  Diagnosis: Acute deep vein thrombosis (DVT) of left lower extremity  Assessment and Plan of Treatment:

## 2025-05-29 NOTE — DISCHARGE NOTE PROVIDER - HOSPITAL COURSE
79m with CAD, HLD, PUD with partial gastrectomy, chronic pancreatic insufficiency, bipolar disorder, recent hip fx 4/20 sp rehab course p/w edema, below the knee DVT    DVT  likely provoked from recent hip surgery  continue eliquis    LE Edema/abdominal and scrotal edema  likely dt  hypoalbuminemia-third spacing   - Echo with normal LV systolic function, no significant valve issues  - CTA chest with small right and trace left pleural effusions  seen by cardiology, cont lasix 20qd    #distended esophagus/stomach on CT- asymptomatic  GI following  tolerating po well  bowel regimen for constipation    chronic pancreatic insufficiency  due to remote stomach surgery  continue creon          LABS:                        12.0   6.79  )-----------( 300      ( 29 May 2025 08:30 )             36.9     05-29    137  |  101  |  16  ----------------------------<  141[H]  4.0   |  28  |  0.81    Ca    9.0      29 May 2025 08:30  Mg     2.0     05-29    TPro  5.5[L]  /  Alb  2.2[L]  /  TBili  0.5  /  DBili  x   /  AST  17  /  ALT  17  /  AlkPhos  232[H]  05-28

## 2025-05-29 NOTE — PROGRESS NOTE ADULT - ASSESSMENT
CHF  Abnormal imaging  Hx gastric bypass    5/24 CT A/P: Status post gastric bypass with a markedly distended gastric pouch and esophagus with wall thickening of the distal esophagus.   5/26 US abdomen: Cholelithiasis without secondary signs of cholecystitis. 10 mm CBD ectasia    Plan:  - CT A/P reviewed; Negative for internal hernia  - No signs of SBO  - PPI BID  - US noted with CBD dilatation; patient is asymptomatic with normal bilirubin  - Advance diet as tolerated  - Monitor GI function   - Lactulose BID ordered, now having BM's   - Will follow  
The patient is an 79 year old male with a history of HTN, HL, CAD s/p PCI, anemia, bipolar disorder who presents with worsening lower extremity swelling.    Plan:  - ECG with sinus rhythm and no evidence of ischemia/infarction  - Echo with normal LV systolic function, no significant valve issues  - CTA chest with small right and trace left pleural effusions  - BNP normal suggesting that leg swelling is not due to heart failure  - He previously had low albumin which can lead to third spacing  - LE venous duplex with left gastrocnemius vein DVT  - Continue aspirin 81 mg daily  - Continue atorvastatin 20 mg daily  - Continue apixaban 10 mg bid for 1 week, then 5 mg bid  - Continue furosemide 20 mg PO daily
79m with CAD, HLD, PUD with partial gastrectomy, chronic pancreatic insufficiency, bipolar disorder, p/w edema, below the knee DVT    DVT  likely provoked from recent surgery  continue eliquis  hematology following    Edema  seems more than can be explained by DVT  has hypoalbuminemia  third spacing +/- rt heart failure?  check tte  cardio following    distended esophagus/stomach  GI following  clears  bowel regimen  advance diet after bm    hypoalbuminemia  malnutrition?    elevated alk phos  not hepatic in origin    chronic pancreatic insufficiency  due to remote stomach surgery?  continue creon    CAD/HLD  continue lipitor and asa
CHF  Abnormal imaging  Hx gastric bypass    5/24 CT A/P: Status post gastric bypass with a markedly distended gastric pouch and esophagus with wall thickening of the distal esophagus.   5/26 US abdomen: Cholelithiasis without secondary signs of cholecystitis. 10 mm CBD ectasia    Plan:  - CT A/P reviewed; Negative for internal hernia  - No signs of SBO  - PPI BID  - US noted with CBD dilatation; patient is asymptomatic with normal bilirubin  - Advance diet as tolerated  - Monitor GI function   - Lactulose BID ordered, now having BM's   - Once tolerating diet, no objection from GI standpoint for discharge planning  - Follow up with outpatient GI Dr. Marshall   - Will follow    
The patient is an 79 year old male with a history of HTN, HL, CAD s/p PCI, anemia, bipolar disorder who presents with worsening lower extremity swelling.    Plan:  - ECG with sinus rhythm and no evidence of ischemia/infarction  - Echo 2/23 with normal LV systolic function, no significant valve issues  - Repeat echo pending  - CTA chest with small right and trace left pleural effusions  - BNP normal suggesting that leg swelling is not due to heart failure  - He previously had low albumin which can lead to third spacing  - LE venous duplex with left gastrocnemius vein DVT  - Continue aspirin 81 mg daily  - Continue atorvastatin 20 mg daily  - Continue apixaban 10 mg bid for 1 week, then 5 mg bid  - Lower to furosemide 20 mg PO daily
The patient is an 79 year old male with a history of HTN, HL, CAD s/p PCI, anemia, bipolar disorder who presents with worsening lower extremity swelling.    Plan:  - ECG with sinus rhythm and no evidence of ischemia/infarction  - Echo with normal LV systolic function, no significant valve issues  - CTA chest with small right and trace left pleural effusions  - BNP normal suggesting that leg swelling is not due to heart failure  - He previously had low albumin which can lead to third spacing  - LE venous duplex with left gastrocnemius vein DVT  - Continue aspirin 81 mg daily  - Continue atorvastatin 20 mg daily  - Continue apixaban 10 mg bid for 1 week, then 5 mg bid  - Continue furosemide 20 mg PO daily
chf  abnormal imaging  hx gastric bypass    5/24 CT a/p: Status post gastric bypass with a markedly distended gastric pouch and esophagus with wall thickening of the distal esophagus.   5/26 US abdomen: Cholelithiasis without secondary signs of cholecystitis. 10 mm CBD ectasia    Plan:  - CT reviewed  - negative for internal hernia  - proton pump inhibitor bid  - no signs of sbo  - advance diet as tolerated  - monitor GI fn  - lactulose BID ordered  - US noted, CBD dilatation. Pt is asymptomatic with normal bilirubin  - Will follow  
79m with CAD, HLD, PUD with partial gastrectomy, chronic pancreatic insufficiency, bipolar disorder, p/w edema, below the knee DVT    DVT  likely provoked from recent surgery  continue eliquis  hematology following    Edema  seems more than can be explained by DVT  has hypoalbuminemia  third spacing +/- rt heart failure?  check tte  cardio following    distended esophagus/stomach  GI following  currently on full liquis  bowel regimen  advance diet as tolerated    hypoalbuminemia  malnutrition?    elevated alk phos  not hepatic in origin    chronic pancreatic insufficiency  due to remote stomach surgery?  continue creon    CAD/HLD  continue lipitor and asa    [facility]   [phone]
79m with CAD, HLD, PUD with partial gastrectomy, chronic pancreatic insufficiency, bipolar disorder, p/w edema, below the knee DVT    DVT  likely provoked from recent surgery  continue lovenox, eventual eliquis  hematology following    Edema  seems more than can be explained by DVT  has hypoalbuminemia  third spacing +/- rt heart failure?  check tte  cardio following    distended esophagus/stomach  GI following  clears  bowel regimen    hypoalbuminemia  malnutrition?    elevated alk phos  not hepatic in origin    chronic pancreatic insufficiency  due to remote stomach surgery?  continue creon    CAD/HLD  continue lipitor and asa
80 yo man w hx CAD, HLD, PUD post partial gastrectomy, chronic pancreatic inisuff, bipolar, recent fall w riht jip fx 4/20 post repair followed by tx to [facility] and home since 5/10  Adm w scooby legs edema x 1-2 wks  Reports was not on anticoag while in Rehab  Still w very limited mobility  Duplex scooby legs 5/24/25 acute left gastrocnemius DVT started on Lovenox. Continues on ASA  CTA chest, a/p-no PE, incr consolidation adj to HARITHA plaque progressiion fibrosis vs pna, trce-sm scooby effusion, mild right hydro w nonobstructive renal stone, gallsones  No SOB/CP  No self prior or famiy hx thromboembolism  CBC on adm 5/24 Hgb 11.6  today 5/25 wbc 6 hgb 9.9 mcv 93.6 okts 279  old labs in computer first week May hgb motly 8s  4/24 iron 24 TIBC 268 sat 9% ferritin 108 folate 5.6    -acute left gastrocnemius DVT in setting of still decr mobility post right hip fx repair 4/20, c/w provoked DVT  -recommend 3months duration of anticoagulation (or longer until pt mobility back to baseline)  -Hgb today incr back to 10.9, no overt sign of bleed. If no additional invasive procedures planned, can change Lovenox to po DOAC ie Eliquis from Heme standpoint  -will also followup as outpatient    discussed w pt  will sign off for now, please call if any quetions  
The patient is an 79 year old male with a history of HTN, HL, CAD s/p PCI, anemia, bipolar disorder who presents with worsening lower extremity swelling.    Plan:  - ECG with sinus rhythm and no evidence of ischemia/infarction  - Echo 2/23 with normal LV systolic function, no significant valve issues  - Repeat echo pending  - CTA chest with small right and trace left pleural effusions  - BNP normal suggesting that leg swelling is not due to heart failure  - He previously had low albumin which can lead to third spacing  - LE venous duplex with left gastrocnemius vein DVT  - Continue aspirin 81 mg daily  - Continue atorvastatin 20 mg daily  - Continue full dose enoxaparin for DVT - transition to apixaban if hemoglobin stable  - Continue furosemide 20 mg IV daily and likely transition to oral diuretics tomorrow
79m with CAD, HLD, PUD with partial gastrectomy, chronic pancreatic insufficiency, bipolar disorder, p/w edema, below the knee DVT    DVT  likely provoked from recent surgery  continue eliquis  hematology following    Edema  seems more than can be explained by DVT  has hypoalbuminemia  third spacing +/- rt heart failure?  check tte  cardio following    distended esophagus/stomach  GI following  currently on full liquis  bowel regimen  advance diet as tolerated    hypoalbuminemia  malnutrition?    elevated alk phos  not hepatic in origin    chronic pancreatic insufficiency  due to remote stomach surgery?  continue creon    CAD/HLD  continue lipitor and asa
CHF  Abnormal imaging  Hx gastric bypass    5/24 CT A/P: Status post gastric bypass with a markedly distended gastric pouch and esophagus with wall thickening of the distal esophagus.   5/26 US abdomen: Cholelithiasis without secondary signs of cholecystitis. 10 mm CBD ectasia    Plan:  - CT A/P reviewed; Negative for internal hernia  - No signs of SBO  - PPI BID  - US noted with CBD dilatation; patient is asymptomatic with normal bilirubin  - Advance diet as tolerated  - Monitor GI function   - Lactulose BID ordered, now having BM's   - Will follow

## 2025-05-29 NOTE — DISCHARGE NOTE PROVIDER - CARE PROVIDER_API CALL
Parag Okeefe  Internal Medicine  [address], [address] [place] [address]  Phone: [phone]  Fax: [phone]  Follow Up Time: 2 weeks

## 2025-05-29 NOTE — PROGRESS NOTE ADULT - NSPROGADDITIONALINFOA_GEN_ALL_CORE
I reviewed the overnight course of events on the unit, re-confirming the patient history. I discussed the care with the patient and their family  The plan of care was discussed with the nurse practitioner and modifications were made to the notation where appropriate.   Differential diagnosis and plan of care discussed with patient after the evaluation  35 minutes spent on total encounter of which more than fifty percent of the encounter was spent counseling and/or coordinating care by the attending physician.  Advanced care planning was discussed with patient and family.  Advanced care planning forms were reviewed and discussed.  Risks, benefits and alternatives of gastroenterologic procedures were discussed in detail and all questions were answered.
I reviewed the overnight course of events on the unit, re-confirming the patient history. I discussed the care with the patient and their family  The plan of care was discussed with the physician assistant and modifications were made to the notation where appropriate.   Differential diagnosis and plan of care discussed with patient after the evaluation  35 minutes spent on total encounter of which more than fifty percent of the encounter was spent counseling and/or coordinating care by the attending physician.  Advanced care planning was discussed with patient and family.  Advanced care planning forms were reviewed and discussed.  Risks, benefits and alternatives of gastroenterologic procedures were discussed in detail and all questions were answered.
I reviewed the overnight course of events on the unit, re-confirming the patient history. I discussed the care with the patient and their family  The plan of care was discussed with the nurse practitioner and modifications were made to the notation where appropriate.   Differential diagnosis and plan of care discussed with patient after the evaluation  35 minutes spent on total encounter of which more than fifty percent of the encounter was spent counseling and/or coordinating care by the attending physician.  Advanced care planning was discussed with patient and family.  Advanced care planning forms were reviewed and discussed.  Risks, benefits and alternatives of gastroenterologic procedures were discussed in detail and all questions were answered.
I reviewed the overnight course of events on the unit, re-confirming the patient history. I discussed the care with the patient and their family  The plan of care was discussed with the nurse practitioner and modifications were made to the notation where appropriate.   Differential diagnosis and plan of care discussed with patient after the evaluation  35 minutes spent on total encounter of which more than fifty percent of the encounter was spent counseling and/or coordinating care by the attending physician.  Advanced care planning was discussed with patient and family.  Advanced care planning forms were reviewed and discussed.  Risks, benefits and alternatives of gastroenterologic procedures were discussed in detail and all questions were answered.

## 2025-05-29 NOTE — DISCHARGE NOTE PROVIDER - NSDCFUSCHEDAPPT_GEN_ALL_CORE_FT
Anastasia Garcia  Health system Physician [facility]  PHYSMED [address]  Scheduled Appointment: 06/18/2025    João Keating  [facility]  CARDIOLOGY [phone]  Scheduled Appointment: 07/30/2025

## 2025-05-29 NOTE — PROGRESS NOTE ADULT - SUBJECTIVE AND OBJECTIVE BOX
All interim records and events noted.    comfortable in bed, no pain or SOB    MEDICATIONS  (STANDING):  aspirin enteric coated 81 milliGRAM(s) Oral daily  atorvastatin 20 milliGRAM(s) Oral at bedtime  enoxaparin Injectable 70 milliGRAM(s) SubCutaneous every 12 hours  famotidine    Tablet 20 milliGRAM(s) Oral daily  furosemide   Injectable 20 milliGRAM(s) IV Push daily  pancrelipase  (CREON 36,000 Lipase Units) 2 Capsule(s) Oral three times a day with meals    MEDICATIONS  (PRN):  acetaminophen     Tablet .. 650 milliGRAM(s) Oral every 6 hours PRN Temp greater or equal to 38C (100.4F), Mild Pain (1 - 3)  aluminum hydroxide/magnesium hydroxide/simethicone Suspension 30 milliLiter(s) Oral every 4 hours PRN Dyspepsia  melatonin 3 milliGRAM(s) Oral at bedtime PRN Insomnia  ondansetron Injectable 4 milliGRAM(s) IV Push every 8 hours PRN Nausea and/or Vomiting      Vital Signs Last 24 Hrs  T(C): 36.4 (26 May 2025 05:03), Max: 36.9 (25 May 2025 21:29)  T(F): 97.6 (26 May 2025 05:03), Max: 98.4 (25 May 2025 21:29)  HR: 76 (26 May 2025 05:03) (70 - 76)  BP: 143/69 (26 May 2025 05:03) (117/60 - 143/69)  BP(mean): --  RR: 16 (26 May 2025 05:03) (16 - 18)  SpO2: 97% (26 May 2025 05:03) (96% - 98%)    Parameters below as of 26 May 2025 05:03  Patient On (Oxygen Delivery Method): room air        PHYSICAL EXAM  General: in no acute distress  Head: atraumatic, normocephalic  ENT: sclera anicteric, buccal mucosa moist  Neck: supple, trachea midline  CV: S1 S2, regular rate and rhythm  Lungs: clear to auscultation, no wheezes/rhonchi  Abdomen: soft, nontender, bowel sounds present, no palpable masses  Skin: no significant increased ecchymosis/petechiae  Neuro: alert and oriented X3,  no focal deficits      LABS:             10.9   6.96  )-----------( 283      ( 05-26 @ 07:20 )             34.9                9.9    6.01  )-----------( 279      ( 05-25 @ 07:20 )             30.8                11.6   6.40  )-----------( 289      ( 05-24 @ 15:04 )             36.5       05-26    139  |  101  |  15  ----------------------------<  85  3.4[L]   |  30  |  0.64    Ca    8.5      26 May 2025 07:20  Mg     2.2     05-26    TPro  5.6[L]  /  Alb  2.3[L]  /  TBili  0.6  /  DBili  x   /  AST  18  /  ALT  19  /  AlkPhos  244[H]  05-26 05-24 @ 17:13  PT11.7 INR0.99  PTT29.9      RADIOLOGY & ADDITIONAL STUDIES:    IMPRESSION/RECOMMENDATIONS:
Chief Complaint: Lower extremity swelling    Interval Events: No events overnight.    Review of Systems:  General: No fevers, chills, weight gain  Skin: No rashes, color changes  Cardiovascular: No chest pain, orthopnea  Respiratory: No shortness of breath, cough  Gastrointestinal: No nausea, abdominal pain  Genitourinary: No incontinence, pain with urination  Musculoskeletal: No pain, swelling, decreased range of motion  Neurological: No headache, weakness  Psychiatric: No depression, anxiety  Endocrine: No weight gain, increased thirst  All other systems are comprehensively negative.    Physical Exam:  Vital Signs Last 24 Hrs  T(C): 36.7 (29 May 2025 05:40), Max: 36.7 (29 May 2025 05:40)  T(F): 98.1 (29 May 2025 05:40), Max: 98.1 (29 May 2025 05:40)  HR: 72 (29 May 2025 05:40) (72 - 73)  BP: 123/66 (29 May 2025 05:40) (123/66 - 134/76)  BP(mean): --  RR: 18 (29 May 2025 05:40) (17 - 18)  SpO2: 94% (29 May 2025 05:40) (94% - 98%)  Parameters below as of 29 May 2025 05:40  Patient On (Oxygen Delivery Method): room air  General: NAD  HEENT: MMM  Neck: No JVD, no carotid bruit  Lungs: CTAB  CV: RRR, nl S1/S2, no M/R/G  Abdomen: S/NT/ND, +BS  Extremities: 1+ LE edema, no cyanosis  Neuro: AAOx3, non-focal  Skin: No rash    Labs:             05-29    137  |  101  |  16  ----------------------------<  141[H]  4.0   |  28  |  0.81    Ca    9.0      29 May 2025 08:30  Mg     2.0     05-29    TPro  5.5[L]  /  Alb  2.2[L]  /  TBili  0.5  /  DBili  x   /  AST  17  /  ALT  17  /  AlkPhos  232[H]  05-28                        12.0   6.79  )-----------( 300      ( 29 May 2025 08:30 )             36.9     ECG/Telemetry: Sinus rhythm
[facility]    Brody Jenkins NP    [address] [place] [address]  [phone]      Chief Complaint:  Patient is a 79y old  Male who presents with a chief complaint of Localized edema     (27 May 2025 10:24)      HPI/ 24 hr events:   Patient seen and examined at bedside  Reports feeling well this morning   No acute GI complaints  Diet advanced to solids yesterday and tolerating       REVIEW OF SYSTEMS:   General: Negative  HEENT: Negative  CV: Negative  Respiratory: Negative  GI: See HPI  : Negative  MSK: Negative  Hematologic: Negative  Skin: Negative    MEDICATIONS:   MEDICATIONS  (STANDING):  apixaban 10 milliGRAM(s) Oral every 12 hours  aspirin enteric coated 81 milliGRAM(s) Oral daily  atorvastatin 20 milliGRAM(s) Oral at bedtime  famotidine    Tablet 20 milliGRAM(s) Oral daily  furosemide    Tablet 20 milliGRAM(s) Oral daily  lactulose Syrup 10 Gram(s) Oral two times a day  pancrelipase  (CREON 36,000 Lipase Units) 2 Capsule(s) Oral three times a day with meals    MEDICATIONS  (PRN):  acetaminophen     Tablet .. 650 milliGRAM(s) Oral every 6 hours PRN Temp greater or equal to 38C (100.4F), Mild Pain (1 - 3)  aluminum hydroxide/magnesium hydroxide/simethicone Suspension 30 milliLiter(s) Oral every 4 hours PRN Dyspepsia  melatonin 3 milliGRAM(s) Oral at bedtime PRN Insomnia  ondansetron Injectable 4 milliGRAM(s) IV Push every 8 hours PRN Nausea and/or Vomiting      ALLERGIES:   Allergies    No Known Allergies    Intolerances        VITAL SIGNS:   Vital Signs Last 24 Hrs  T(C): 36.7 (29 May 2025 05:40), Max: 36.7 (29 May 2025 05:40)  T(F): 98.1 (29 May 2025 05:40), Max: 98.1 (29 May 2025 05:40)  HR: 72 (29 May 2025 05:40) (72 - 73)  BP: 123/66 (29 May 2025 05:40) (123/66 - 124/75)  BP(mean): --  RR: 18 (29 May 2025 05:40) (17 - 18)  SpO2: 94% (29 May 2025 05:40) (94% - 97%)    Parameters below as of 29 May 2025 05:40  Patient On (Oxygen Delivery Method): room air      I&O's Summary    28 May 2025 07:01  -  29 May 2025 07:00  --------------------------------------------------------  IN: 0 mL / OUT: 1400 mL / NET: -1400 mL        PHYSICAL EXAM:   GENERAL:  No acute distress  HEENT:  NC/AT  CHEST:  No increased effort  HEART:  Regular rate  ABDOMEN:  Soft, non-tender, non-distended  EXTREMITIES: No cyanosis  SKIN:  Warm, dry  NEURO:  Calm, cooperative    LABS:                        12.0   6.79  )-----------( 300      ( 29 May 2025 08:30 )             36.9     05-29    137  |  101  |  16  ----------------------------<  141[H]  4.0   |  28  |  0.81    Ca    9.0      29 May 2025 08:30  Mg     2.0     05-29    TPro  5.5[L]  /  Alb  2.2[L]  /  TBili  0.5  /  DBili  x   /  AST  17  /  ALT  17  /  AlkPhos  232[H]  05-28    LIVER FUNCTIONS - ( 28 May 2025 07:07 )  Alb: 2.2 g/dL / Pro: 5.5 g/dL / ALK PHOS: 232 U/L / ALT: 17 U/L / AST: 17 U/L / GGT: x                                             RADIOLOGY & ADDITIONAL STUDIES:  
Patient is a 79y old  Male who presents with a chief complaint of       INTERVAL HPI/OVERNIGHT EVENTS:   no complaints  pt seen and examined         Vital Signs Last 24 Hrs  T(C): 36.8 (25 May 2025 11:47), Max: 36.8 (25 May 2025 11:47)  T(F): 98.3 (25 May 2025 11:47), Max: 98.3 (25 May 2025 11:47)  HR: 71 (25 May 2025 11:47) (70 - 86)  BP: 117/60 (25 May 2025 11:47) (117/60 - 156/71)  BP(mean): --  RR: 18 (25 May 2025 11:47) (18 - 20)  SpO2: 96% (25 May 2025 11:47) (96% - 98%)    Parameters below as of 25 May 2025 11:47  Patient On (Oxygen Delivery Method): room air        acetaminophen     Tablet .. 650 milliGRAM(s) Oral every 6 hours PRN  aluminum hydroxide/magnesium hydroxide/simethicone Suspension 30 milliLiter(s) Oral every 4 hours PRN  aspirin enteric coated 81 milliGRAM(s) Oral daily  atorvastatin 20 milliGRAM(s) Oral at bedtime  enoxaparin Injectable 70 milliGRAM(s) SubCutaneous every 12 hours  famotidine    Tablet 20 milliGRAM(s) Oral daily  furosemide   Injectable 20 milliGRAM(s) IV Push daily  melatonin 3 milliGRAM(s) Oral at bedtime PRN  ondansetron Injectable 4 milliGRAM(s) IV Push every 8 hours PRN  pancrelipase  (CREON 36,000 Lipase Units) 2 Capsule(s) Oral three times a day with meals      PHYSICAL EXAM:  GENERAL: NAD   EYES: conjunctiva and sclera clear  ENMT: Moist mucous membranes  NECK: Supple, No JVD, Normal thyroid  CHEST/LUNG: non labored, cta b/l  HEART: Regular rate and rhythm; No murmurs, rubs, or gallops  ABDOMEN: Soft, Nontender, Nondistended; Bowel sounds present  EXTREMITIES:  No clubbing, no cyanosis, no edema  LYMPH: No lymphadenopathy noted  SKIN: No rashes or lesions  NEURO: no new focal deficits    Consultant(s) Notes Reviewed:  [x ] YES  [ ] NO  Care Discussed with Consultants/Other Providers [ x] YES  [ ] NO    LABS:                        9.9    6.01  )-----------( 279      ( 25 May 2025 07:20 )             30.8     05-25    139  |  105  |  18  ----------------------------<  84  3.7   |  28  |  0.70    Ca    8.4[L]      25 May 2025 07:20  Mg     1.9     05-25    TPro  5.3[L]  /  Alb  2.2[L]  /  TBili  0.5  /  DBili  x   /  AST  15  /  ALT  18  /  AlkPhos  254[H]  05-25    PT/INR - ( 24 May 2025 17:13 )   PT: 11.7 sec;   INR: 0.99 ratio         PTT - ( 24 May 2025 17:13 )  PTT:29.9 sec  Urinalysis Basic - ( 25 May 2025 07:20 )    Color: x / Appearance: x / SG: x / pH: x  Gluc: 84 mg/dL / Ketone: x  / Bili: x / Urobili: x   Blood: x / Protein: x / Nitrite: x   Leuk Esterase: x / RBC: x / WBC x   Sq Epi: x / Non Sq Epi: x / Bacteria: x      CAPILLARY BLOOD GLUCOSE            Urinalysis Basic - ( 25 May 2025 07:20 )    Color: x / Appearance: x / SG: x / pH: x  Gluc: 84 mg/dL / Ketone: x  / Bili: x / Urobili: x   Blood: x / Protein: x / Nitrite: x   Leuk Esterase: x / RBC: x / WBC x   Sq Epi: x / Non Sq Epi: x / Bacteria: x          RADIOLOGY & ADDITIONAL TESTS:    Imaging Personally Reviewed  Reviewed consultants input
Patient is a 79y old  Male who presents with a chief complaint of Localized edema     (27 May 2025 10:24)        INTERVAL HPI/OVERNIGHT EVENTS:   no complaints  pt seen and examined         Vital Signs Last 24 Hrs  T(C): 36.6 (27 May 2025 11:27), Max: 36.8 (26 May 2025 22:47)  T(F): 97.8 (27 May 2025 11:27), Max: 98.2 (26 May 2025 22:47)  HR: 81 (27 May 2025 11:27) (67 - 81)  BP: 107/67 (27 May 2025 11:27) (107/67 - 154/66)  BP(mean): --  RR: 17 (27 May 2025 11:27) (16 - 17)  SpO2: 98% (27 May 2025 11:27) (97% - 99%)    Parameters below as of 27 May 2025 11:27  Patient On (Oxygen Delivery Method): room air        acetaminophen     Tablet .. 650 milliGRAM(s) Oral every 6 hours PRN  aluminum hydroxide/magnesium hydroxide/simethicone Suspension 30 milliLiter(s) Oral every 4 hours PRN  apixaban 10 milliGRAM(s) Oral every 12 hours  aspirin enteric coated 81 milliGRAM(s) Oral daily  atorvastatin 20 milliGRAM(s) Oral at bedtime  famotidine    Tablet 20 milliGRAM(s) Oral daily  lactulose Syrup 10 Gram(s) Oral two times a day  melatonin 3 milliGRAM(s) Oral at bedtime PRN  ondansetron Injectable 4 milliGRAM(s) IV Push every 8 hours PRN  pancrelipase  (CREON 36,000 Lipase Units) 2 Capsule(s) Oral three times a day with meals      PHYSICAL EXAM:  GENERAL: NAD   EYES: conjunctiva and sclera clear  ENMT: Moist mucous membranes  NECK: Supple, No JVD, Normal thyroid  CHEST/LUNG: non labored, cta b/l  HEART: Regular rate and rhythm; No murmurs, rubs, or gallops  ABDOMEN: Soft, Nontender, Nondistended; Bowel sounds present  EXTREMITIES:  No clubbing, no cyanosis, no edema  LYMPH: No lymphadenopathy noted  SKIN: No rashes or lesions  NEURO: no new focal deficits    Consultant(s) Notes Reviewed:  [x ] YES  [ ] NO  Care Discussed with Consultants/Other Providers [ x] YES  [ ] NO    LABS:                        12.1   7.13  )-----------( 295      ( 27 May 2025 06:55 )             38.6     05-27    139  |  100  |  12  ----------------------------<  88  3.7   |  33[H]  |  0.70    Ca    8.9      27 May 2025 06:55  Mg     2.1     05-27    TPro  6.1  /  Alb  2.4[L]  /  TBili  0.6  /  DBili  x   /  AST  17  /  ALT  19  /  AlkPhos  260[H]  05-27      Urinalysis Basic - ( 27 May 2025 06:55 )    Color: x / Appearance: x / SG: x / pH: x  Gluc: 88 mg/dL / Ketone: x  / Bili: x / Urobili: x   Blood: x / Protein: x / Nitrite: x   Leuk Esterase: x / RBC: x / WBC x   Sq Epi: x / Non Sq Epi: x / Bacteria: x      CAPILLARY BLOOD GLUCOSE            Urinalysis Basic - ( 27 May 2025 06:55 )    Color: x / Appearance: x / SG: x / pH: x  Gluc: 88 mg/dL / Ketone: x  / Bili: x / Urobili: x   Blood: x / Protein: x / Nitrite: x   Leuk Esterase: x / RBC: x / WBC x   Sq Epi: x / Non Sq Epi: x / Bacteria: x          RADIOLOGY & ADDITIONAL TESTS:    Imaging Personally Reviewed  Reviewed consultants input
Chief Complaint: Lower extremity swelling    Interval Events: No events overnight.    Review of Systems:  General: No fevers, chills, weight gain  Skin: No rashes, color changes  Cardiovascular: No chest pain, orthopnea  Respiratory: No shortness of breath, cough  Gastrointestinal: No nausea, abdominal pain  Genitourinary: No incontinence, pain with urination  Musculoskeletal: No pain, swelling, decreased range of motion  Neurological: No headache, weakness  Psychiatric: No depression, anxiety  Endocrine: No weight gain, increased thirst  All other systems are comprehensively negative.    Physical Exam:  Vital Signs Last 24 Hrs  T(C): 36.3 (28 May 2025 05:10), Max: 36.6 (27 May 2025 11:27)  T(F): 97.3 (28 May 2025 05:10), Max: 97.8 (27 May 2025 11:27)  HR: 82 (28 May 2025 05:10) (70 - 82)  BP: 120/63 (28 May 2025 05:10) (107/67 - 120/63)  BP(mean): --  RR: 17 (28 May 2025 05:10) (17 - 17)  SpO2: 93% (28 May 2025 05:10) (93% - 98%)  Parameters below as of 28 May 2025 05:10  Patient On (Oxygen Delivery Method): room air  General: NAD  HEENT: MMM  Neck: No JVD, no carotid bruit  Lungs: CTAB  CV: RRR, nl S1/S2, no M/R/G  Abdomen: S/NT/ND, +BS  Extremities: 1+ LE edema, no cyanosis  Neuro: AAOx3, non-focal  Skin: No rash    Labs:             05-28    138  |  101  |  14  ----------------------------<  86  3.4[L]   |  32[H]  |  0.65    Ca    8.6      28 May 2025 07:07  Mg     2.1     05-28    TPro  5.5[L]  /  Alb  2.2[L]  /  TBili  0.5  /  DBili  x   /  AST  17  /  ALT  17  /  AlkPhos  232[H]  05-28                        10.9   7.17  )-----------( 270      ( 28 May 2025 07:07 )             33.8       ECG/Telemetry: Sinus rhythm
Chief Complaint: Lower extremity swelling    Interval Events: No events overnight.    Review of Systems:  General: No fevers, chills, weight gain  Skin: No rashes, color changes  Cardiovascular: No chest pain, orthopnea  Respiratory: No shortness of breath, cough  Gastrointestinal: No nausea, abdominal pain  Genitourinary: No incontinence, pain with urination  Musculoskeletal: No pain, swelling, decreased range of motion  Neurological: No headache, weakness  Psychiatric: No depression, anxiety  Endocrine: No weight gain, increased thirst  All other systems are comprehensively negative.    Physical Exam:  Vitals:        Vital Signs Last 24 Hrs  T(C): 36.4 (26 May 2025 05:03), Max: 36.9 (25 May 2025 21:29)  T(F): 97.6 (26 May 2025 05:03), Max: 98.4 (25 May 2025 21:29)  HR: 76 (26 May 2025 05:03) (70 - 76)  BP: 143/69 (26 May 2025 05:03) (117/60 - 143/69)  BP(mean): --  RR: 16 (26 May 2025 05:03) (16 - 18)  SpO2: 97% (26 May 2025 05:03) (96% - 98%)  Parameters below as of 26 May 2025 05:03  Patient On (Oxygen Delivery Method): room air  General: NAD  HEENT: MMM  Neck: No JVD, no carotid bruit  Lungs: CTAB  CV: RRR, nl S1/S2, no M/R/G  Abdomen: S/NT/ND, +BS  Extremities: 1+ LE edema, no cyanosis  Neuro: AAOx3, non-focal  Skin: No rash    Labs:                        9.9    6.01  )-----------( 279      ( 25 May 2025 07:20 )             30.8     05-25    139  |  105  |  18  ----------------------------<  84  3.7   |  28  |  0.70    Ca    8.4[L]      25 May 2025 07:20  Mg     1.9     05-25    TPro  5.3[L]  /  Alb  2.2[L]  /  TBili  0.5  /  DBili  x   /  AST  15  /  ALT  18  /  AlkPhos  254[H]  05-25        PT/INR - ( 24 May 2025 17:13 )   PT: 11.7 sec;   INR: 0.99 ratio         PTT - ( 24 May 2025 17:13 )  PTT:29.9 sec    ECG/Telemetry: Sinus rhythm
[facility]    Brody Jenkins NP    [address] [place] [address]  [phone]      Chief Complaint:  Patient is a 79y old  Male who presents with a chief complaint of Localized edema     (27 May 2025 10:24)      HPI/ 24 hr events:   Patient seen and examined at bedside  Reports feeling well this morning   No acute GI complaints  Had BM overnight and this morning  Tolerating clear liquid diet       REVIEW OF SYSTEMS:   General: Negative  HEENT: Negative  CV: Negative  Respiratory: Negative  GI: See HPI  : Negative  MSK: Negative  Hematologic: Negative  Skin: Negative    MEDICATIONS:   MEDICATIONS  (STANDING):  apixaban 10 milliGRAM(s) Oral every 12 hours  aspirin enteric coated 81 milliGRAM(s) Oral daily  atorvastatin 20 milliGRAM(s) Oral at bedtime  famotidine    Tablet 20 milliGRAM(s) Oral daily  lactulose Syrup 10 Gram(s) Oral two times a day  pancrelipase  (CREON 36,000 Lipase Units) 2 Capsule(s) Oral three times a day with meals    MEDICATIONS  (PRN):  acetaminophen     Tablet .. 650 milliGRAM(s) Oral every 6 hours PRN Temp greater or equal to 38C (100.4F), Mild Pain (1 - 3)  aluminum hydroxide/magnesium hydroxide/simethicone Suspension 30 milliLiter(s) Oral every 4 hours PRN Dyspepsia  melatonin 3 milliGRAM(s) Oral at bedtime PRN Insomnia  ondansetron Injectable 4 milliGRAM(s) IV Push every 8 hours PRN Nausea and/or Vomiting      ALLERGIES:   Allergies    No Known Allergies    Intolerances        VITAL SIGNS:   Vital Signs Last 24 Hrs  T(C): 36.4 (27 May 2025 04:39), Max: 36.8 (26 May 2025 22:47)  T(F): 97.6 (27 May 2025 04:39), Max: 98.2 (26 May 2025 22:47)  HR: 70 (27 May 2025 04:39) (65 - 72)  BP: 154/66 (27 May 2025 04:39) (121/71 - 154/66)  BP(mean): --  RR: 16 (27 May 2025 04:39) (16 - 17)  SpO2: 99% (27 May 2025 04:39) (97% - 100%)    Parameters below as of 27 May 2025 04:39  Patient On (Oxygen Delivery Method): room air      I&O's Summary    26 May 2025 07:01  -  27 May 2025 07:00  --------------------------------------------------------  IN: 0 mL / OUT: 3050 mL / NET: -3050 mL    27 May 2025 07:01  -  27 May 2025 10:55  --------------------------------------------------------  IN: 0 mL / OUT: 1600 mL / NET: -1600 mL        PHYSICAL EXAM:   GENERAL:  No acute distress  HEENT:  NC/AT  CHEST:  No increased effort  HEART:  Regular rate  ABDOMEN:  Soft, non-tender, non-distended  EXTREMITIES: No cyanosis  SKIN:  Warm, dry  NEURO:  Calm, cooperative    LABS:                        12.1   7.13  )-----------( 295      ( 27 May 2025 06:55 )             38.6     05-27    139  |  100  |  12  ----------------------------<  88  3.7   |  33[H]  |  0.70    Ca    8.9      27 May 2025 06:55  Mg     2.1     05-27    TPro  6.1  /  Alb  2.4[L]  /  TBili  0.6  /  DBili  x   /  AST  17  /  ALT  19  /  AlkPhos  260[H]  05-27    LIVER FUNCTIONS - ( 27 May 2025 06:55 )  Alb: 2.4 g/dL / Pro: 6.1 g/dL / ALK PHOS: 260 U/L / ALT: 19 U/L / AST: 17 U/L / GGT: x                   Hepatitis C Virus S/CO Ratio: 0.10 S/CO (05-25-25 @ 07:20)  Hepatitis B Core IgM Antibody: Nonreact (05-25-25 @ 07:20)  Hepatitis B Surface Antigen: Nonreact (05-25-25 @ 07:20)  Hepatitis A IgM Antibody: Nonreact (05-25-25 @ 07:20)                            RADIOLOGY & ADDITIONAL STUDIES:  
Patient is a 79y old  Male who presents with a chief complaint of Localized edema     (27 May 2025 10:24)      INTERVAL HPI/OVERNIGHT EVENTS: noted  pt seen and examined this am   events noted  feels well      Vital Signs Last 24 Hrs  T(C): 36.3 (28 May 2025 10:59), Max: 36.4 (27 May 2025 20:57)  T(F): 97.4 (28 May 2025 10:59), Max: 97.5 (27 May 2025 20:57)  HR: 72 (28 May 2025 10:59) (70 - 82)  BP: 134/76 (28 May 2025 10:59) (118/51 - 134/76)  BP(mean): --  RR: 17 (28 May 2025 10:59) (17 - 17)  SpO2: 98% (28 May 2025 10:59) (93% - 98%)    Parameters below as of 28 May 2025 10:59  Patient On (Oxygen Delivery Method): room air        acetaminophen     Tablet .. 650 milliGRAM(s) Oral every 6 hours PRN  aluminum hydroxide/magnesium hydroxide/simethicone Suspension 30 milliLiter(s) Oral every 4 hours PRN  apixaban 10 milliGRAM(s) Oral every 12 hours  aspirin enteric coated 81 milliGRAM(s) Oral daily  atorvastatin 20 milliGRAM(s) Oral at bedtime  famotidine    Tablet 20 milliGRAM(s) Oral daily  furosemide    Tablet 20 milliGRAM(s) Oral daily  lactulose Syrup 10 Gram(s) Oral two times a day  melatonin 3 milliGRAM(s) Oral at bedtime PRN  ondansetron Injectable 4 milliGRAM(s) IV Push every 8 hours PRN  pancrelipase  (CREON 36,000 Lipase Units) 2 Capsule(s) Oral three times a day with meals      PHYSICAL EXAM:  GENERAL: NAD,   EYES: conjunctiva and sclera clear  ENMT: Moist mucous membranes  NECK: Supple, No JVD, Normal thyroid  CHEST/LUNG: non labored, cta b/l  HEART: Regular rate and rhythm; No murmurs, rubs, or gallops  ABDOMEN: Soft, Nontender, Nondistended; Bowel sounds present  EXTREMITIES:  2+ Peripheral Pulses, No clubbing, cyanosis, or edema  LYMPH: No lymphadenopathy noted  SKIN: No rashes or lesions    Consultant(s) Notes Reviewed:  [x ] YES  [ ] NO  Care Discussed with Consultants/Other Providers [ x] YES  [ ] NO    LABS:                        10.9   7.17  )-----------( 270      ( 28 May 2025 07:07 )             33.8     05-28    138  |  101  |  14  ----------------------------<  86  3.4[L]   |  32[H]  |  0.65    Ca    8.6      28 May 2025 07:07  Mg     2.1     05-28    TPro  5.5[L]  /  Alb  2.2[L]  /  TBili  0.5  /  DBili  x   /  AST  17  /  ALT  17  /  AlkPhos  232[H]  05-28      Urinalysis Basic - ( 28 May 2025 07:07 )    Color: x / Appearance: x / SG: x / pH: x  Gluc: 86 mg/dL / Ketone: x  / Bili: x / Urobili: x   Blood: x / Protein: x / Nitrite: x   Leuk Esterase: x / RBC: x / WBC x   Sq Epi: x / Non Sq Epi: x / Bacteria: x      CAPILLARY BLOOD GLUCOSE            Urinalysis Basic - ( 28 May 2025 07:07 )    Color: x / Appearance: x / SG: x / pH: x  Gluc: 86 mg/dL / Ketone: x  / Bili: x / Urobili: x   Blood: x / Protein: x / Nitrite: x   Leuk Esterase: x / RBC: x / WBC x   Sq Epi: x / Non Sq Epi: x / Bacteria: x          RADIOLOGY & ADDITIONAL TESTS:    Imaging Personally Reviewed:  [x ] YES  [ ] NO
Chief Complaint: Lower extremity swelling    Interval Events: No events overnight.    Review of Systems:  General: No fevers, chills, weight gain  Skin: No rashes, color changes  Cardiovascular: No chest pain, orthopnea  Respiratory: No shortness of breath, cough  Gastrointestinal: No nausea, abdominal pain  Genitourinary: No incontinence, pain with urination  Musculoskeletal: No pain, swelling, decreased range of motion  Neurological: No headache, weakness  Psychiatric: No depression, anxiety  Endocrine: No weight gain, increased thirst  All other systems are comprehensively negative.    Physical Exam:  Vital Signs Last 24 Hrs  T(C): 36.4 (27 May 2025 04:39), Max: 36.8 (26 May 2025 22:47)  T(F): 97.6 (27 May 2025 04:39), Max: 98.2 (26 May 2025 22:47)  HR: 70 (27 May 2025 04:39) (65 - 72)  BP: 154/66 (27 May 2025 04:39) (121/71 - 154/66)  BP(mean): --  RR: 16 (27 May 2025 04:39) (16 - 17)  SpO2: 99% (27 May 2025 04:39) (97% - 100%)  Parameters below as of 27 May 2025 04:39  Patient On (Oxygen Delivery Method): room air  General: NAD  HEENT: MMM  Neck: No JVD, no carotid bruit  Lungs: CTAB  CV: RRR, nl S1/S2, no M/R/G  Abdomen: S/NT/ND, +BS  Extremities: 1+ LE edema, no cyanosis  Neuro: AAOx3, non-focal  Skin: No rash    Labs:             05-27    139  |  100  |  12  ----------------------------<  88  3.7   |  33[H]  |  0.70    Ca    8.9      27 May 2025 06:55  Mg     2.1     05-27    TPro  6.1  /  Alb  2.4[L]  /  TBili  0.6  /  DBili  x   /  AST  17  /  ALT  19  /  AlkPhos  260[H]  05-27                        12.1   7.13  )-----------( 295      ( 27 May 2025 06:55 )             38.6       ECG/Telemetry: Sinus rhythm
Patient is a 79y old  Male who presents with a chief complaint of       INTERVAL HPI/OVERNIGHT EVENTS:   no complaints  pt seen and examined         Vital Signs Last 24 Hrs  T(C): 36.5 (26 May 2025 11:19), Max: 36.9 (25 May 2025 21:29)  T(F): 97.7 (26 May 2025 11:19), Max: 98.4 (25 May 2025 21:29)  HR: 65 (26 May 2025 11:19) (65 - 76)  BP: 121/71 (26 May 2025 11:19) (121/71 - 143/69)  BP(mean): --  RR: 17 (26 May 2025 11:19) (16 - 17)  SpO2: 100% (26 May 2025 11:19) (97% - 100%)    Parameters below as of 26 May 2025 11:19  Patient On (Oxygen Delivery Method): room air        acetaminophen     Tablet .. 650 milliGRAM(s) Oral every 6 hours PRN  aluminum hydroxide/magnesium hydroxide/simethicone Suspension 30 milliLiter(s) Oral every 4 hours PRN  apixaban 10 milliGRAM(s) Oral every 12 hours  aspirin enteric coated 81 milliGRAM(s) Oral daily  atorvastatin 20 milliGRAM(s) Oral at bedtime  famotidine    Tablet 20 milliGRAM(s) Oral daily  furosemide   Injectable 20 milliGRAM(s) IV Push daily  lactulose Syrup 10 Gram(s) Oral two times a day  melatonin 3 milliGRAM(s) Oral at bedtime PRN  ondansetron Injectable 4 milliGRAM(s) IV Push every 8 hours PRN  pancrelipase  (CREON 36,000 Lipase Units) 2 Capsule(s) Oral three times a day with meals      PHYSICAL EXAM:  GENERAL: NAD   EYES: conjunctiva and sclera clear  ENMT: Moist mucous membranes  NECK: Supple, No JVD, Normal thyroid  CHEST/LUNG: non labored, cta b/l  HEART: Regular rate and rhythm; No murmurs, rubs, or gallops  ABDOMEN: Soft, Nontender, Nondistended; Bowel sounds present  EXTREMITIES:  No clubbing, no cyanosis, no edema  LYMPH: No lymphadenopathy noted  SKIN: No rashes or lesions  NEURO: no new focal deficits    Consultant(s) Notes Reviewed:  [x ] YES  [ ] NO  Care Discussed with Consultants/Other Providers [ x] YES  [ ] NO    LABS:                        10.9   6.96  )-----------( 283      ( 26 May 2025 07:20 )             34.9     05-26    139  |  101  |  15  ----------------------------<  85  3.4[L]   |  30  |  0.64    Ca    8.5      26 May 2025 07:20  Mg     2.2     05-26    TPro  5.6[L]  /  Alb  2.3[L]  /  TBili  0.6  /  DBili  x   /  AST  18  /  ALT  19  /  AlkPhos  244[H]  05-26    PT/INR - ( 24 May 2025 17:13 )   PT: 11.7 sec;   INR: 0.99 ratio         PTT - ( 24 May 2025 17:13 )  PTT:29.9 sec  Urinalysis Basic - ( 26 May 2025 07:20 )    Color: x / Appearance: x / SG: x / pH: x  Gluc: 85 mg/dL / Ketone: x  / Bili: x / Urobili: x   Blood: x / Protein: x / Nitrite: x   Leuk Esterase: x / RBC: x / WBC x   Sq Epi: x / Non Sq Epi: x / Bacteria: x      CAPILLARY BLOOD GLUCOSE            Urinalysis Basic - ( 26 May 2025 07:20 )    Color: x / Appearance: x / SG: x / pH: x  Gluc: 85 mg/dL / Ketone: x  / Bili: x / Urobili: x   Blood: x / Protein: x / Nitrite: x   Leuk Esterase: x / RBC: x / WBC x   Sq Epi: x / Non Sq Epi: x / Bacteria: x          RADIOLOGY & ADDITIONAL TESTS:    Imaging Personally Reviewed  Reviewed consultants input
[facility]    Brody Jenkins NP    [address] [place] [address]  [phone]      Chief Complaint:  Patient is a 79y old  Male who presents with a chief complaint of Localized edema     (27 May 2025 10:24)      HPI/ 24 hr events:   Patient seen and examined at bedside  Reports feeling well this morning   No acute GI complaints  Diet advanced to full liquids yesterday and tolerating   Passing flatus, LBM yesterday       REVIEW OF SYSTEMS:   General: Negative  HEENT: Negative  CV: Negative  Respiratory: Negative  GI: See HPI  : Negative  MSK: Negative  Hematologic: Negative  Skin: Negative    MEDICATIONS:   MEDICATIONS  (STANDING):  apixaban 10 milliGRAM(s) Oral every 12 hours  aspirin enteric coated 81 milliGRAM(s) Oral daily  atorvastatin 20 milliGRAM(s) Oral at bedtime  famotidine    Tablet 20 milliGRAM(s) Oral daily  furosemide    Tablet 20 milliGRAM(s) Oral daily  lactulose Syrup 10 Gram(s) Oral two times a day  pancrelipase  (CREON 36,000 Lipase Units) 2 Capsule(s) Oral three times a day with meals    MEDICATIONS  (PRN):  acetaminophen     Tablet .. 650 milliGRAM(s) Oral every 6 hours PRN Temp greater or equal to 38C (100.4F), Mild Pain (1 - 3)  aluminum hydroxide/magnesium hydroxide/simethicone Suspension 30 milliLiter(s) Oral every 4 hours PRN Dyspepsia  melatonin 3 milliGRAM(s) Oral at bedtime PRN Insomnia  ondansetron Injectable 4 milliGRAM(s) IV Push every 8 hours PRN Nausea and/or Vomiting      ALLERGIES:   Allergies    No Known Allergies    Intolerances        VITAL SIGNS:   Vital Signs Last 24 Hrs  T(C): 36.3 (28 May 2025 05:10), Max: 36.6 (27 May 2025 11:27)  T(F): 97.3 (28 May 2025 05:10), Max: 97.8 (27 May 2025 11:27)  HR: 82 (28 May 2025 05:10) (70 - 82)  BP: 120/63 (28 May 2025 05:10) (107/67 - 120/63)  BP(mean): --  RR: 17 (28 May 2025 05:10) (17 - 17)  SpO2: 93% (28 May 2025 05:10) (93% - 98%)    Parameters below as of 28 May 2025 05:10  Patient On (Oxygen Delivery Method): room air      I&O's Summary    27 May 2025 07:01  -  28 May 2025 07:00  --------------------------------------------------------  IN: 0 mL / OUT: 2400 mL / NET: -2400 mL        PHYSICAL EXAM:   GENERAL:  No acute distress  HEENT:  NC/AT  CHEST:  No increased effort  HEART:  Regular rate  ABDOMEN:  Soft, non-tender, non-distended  EXTREMITIES: No cyanosis  SKIN:  Warm, dry  NEURO:  Calm, cooperative    LABS:                        10.9   7.17  )-----------( 270      ( 28 May 2025 07:07 )             33.8     05-28    138  |  101  |  14  ----------------------------<  86  3.4[L]   |  32[H]  |  0.65    Ca    8.6      28 May 2025 07:07  Mg     2.1     05-28    TPro  5.5[L]  /  Alb  2.2[L]  /  TBili  0.5  /  DBili  x   /  AST  17  /  ALT  17  /  AlkPhos  232[H]  05-28    LIVER FUNCTIONS - ( 28 May 2025 07:07 )  Alb: 2.2 g/dL / Pro: 5.5 g/dL / ALK PHOS: 232 U/L / ALT: 17 U/L / AST: 17 U/L / GGT: x                                             RADIOLOGY & ADDITIONAL STUDIES:  
[facility]  Brody Jenkins NP  [address] [place] [address]  [phone]      INTERVAL HPI/OVERNIGHT EVENTS:  Pt s/e  Pt reports no nausea or vomiting. He states he has not had a BM in several days  Otherwise denies abdominal pain or further GI complaints    MEDICATIONS  (STANDING):  aspirin enteric coated 81 milliGRAM(s) Oral daily  atorvastatin 20 milliGRAM(s) Oral at bedtime  enoxaparin Injectable 70 milliGRAM(s) SubCutaneous every 12 hours  famotidine    Tablet 20 milliGRAM(s) Oral daily  furosemide   Injectable 20 milliGRAM(s) IV Push daily  lactulose Syrup 10 Gram(s) Oral two times a day  pancrelipase  (CREON 36,000 Lipase Units) 2 Capsule(s) Oral three times a day with meals    MEDICATIONS  (PRN):  acetaminophen     Tablet .. 650 milliGRAM(s) Oral every 6 hours PRN Temp greater or equal to 38C (100.4F), Mild Pain (1 - 3)  aluminum hydroxide/magnesium hydroxide/simethicone Suspension 30 milliLiter(s) Oral every 4 hours PRN Dyspepsia  melatonin 3 milliGRAM(s) Oral at bedtime PRN Insomnia  ondansetron Injectable 4 milliGRAM(s) IV Push every 8 hours PRN Nausea and/or Vomiting      Allergies  No Known Allergies      PHYSICAL EXAM:   Vital Signs:  Vital Signs Last 24 Hrs  T(C): 36.5 (26 May 2025 11:19), Max: 36.9 (25 May 2025 21:29)  T(F): 97.7 (26 May 2025 11:19), Max: 98.4 (25 May 2025 21:29)  HR: 65 (26 May 2025 11:19) (65 - 76)  BP: 121/71 (26 May 2025 11:19) (117/60 - 143/69)  BP(mean): --  RR: 17 (26 May 2025 11:19) (16 - 18)  SpO2: 100% (26 May 2025 11:19) (96% - 100%)    Parameters below as of 26 May 2025 11:19  Patient On (Oxygen Delivery Method): room air      Daily     Daily Weight in k.2 (26 May 2025 05:03)    GENERAL:  Appears stated age  HEENT:  NC/AT  CHEST:  Full & symmetric excursion  HEART:  Regular rhythm  ABDOMEN:  Soft, non-tender, non-distended  EXTEREMITIES:  no cyanosis  SKIN:  No rash  NEURO:  Alert      LABS:                        10.9   6.96  )-----------( 283      ( 26 May 2025 07:20 )             34.9         139  |  101  |  15  ----------------------------<  85  3.4[L]   |  30  |  0.64    Ca    8.5      26 May 2025 07:20  Mg     2.2         TPro  5.6[L]  /  Alb  2.3[L]  /  TBili  0.6  /  DBili  x   /  AST  18  /  ALT  19  /  AlkPhos  244[H]      PT/INR - ( 24 May 2025 17:13 )   PT: 11.7 sec;   INR: 0.99 ratio         PTT - ( 24 May 2025 17:13 )  PTT:29.9 sec  Urinalysis Basic - ( 26 May 2025 07:20 )    Color: x / Appearance: x / SG: x / pH: x  Gluc: 85 mg/dL / Ketone: x  / Bili: x / Urobili: x   Blood: x / Protein: x / Nitrite: x   Leuk Esterase: x / RBC: x / WBC x   Sq Epi: x / Non Sq Epi: x / Bacteria: x    RADIOLOGY  < from: CT Abdomen and Pelvis w/ IV Cont (25 @ 19:53) >    ACC: [id number] EXAM:  CT ABDOMEN AND PELVIS IC   ORDERED BY: LARS KWOK     ACC: [id number] EXAM:  CT ANGIO CHEST PULM ART [facility]   ORDERED BY: LARS KWOK     PROCEDURE DATE:  2025          INTERPRETATION:  CLINICAL INFORMATION:DVT with recent surgery, new   anasarca, low protein, and from a seated. Rule out pulmonary embolism and   evaluate for mass.    COMPARISON: Chest CT dated 2017, CT pelvis dated 2025, and   lumbar spine CT dated 2024.    CONTRAST/COMPLICATIONS:  IV Contrast: Omnipaque 350  90 cc administered   10 cc discarded  Oral Contrast: NONE  Complications: None documented at time of interpretation.    PROCEDURE:  CT Angiography of the Chest was performed followed by portal venous phase   imaging of the Abdomen and Pelvis.  Sagittal and coronal reformats were performed as well as 3D (MIP)   reconstructions.    Note: Please note that some voice-recognition transcription errors may be   missed due to low accuracy of [employer] voice-recognition software,   which resulted in numerous errors during dictation of this report.    FINDINGS:  CHEST:  LUNGS AND LARGE AIRWAYS: Patent central airways. There are subtle   groundglass tree-in-bud nodular opacities within the left lower lobe.   Right greater than left bibasilar atelectasis adjacent to the pleural   effusions.  PLEURA: Very small right pleural effusion and trace left pleural   effusion. There is bilateral posterior upper lobe predominant pleural   thickening and calcification with new consolidative opacity along the   left pleural calcification (302-44). The calcified pleural plaques are   similar to 2017.  VESSELS: The pulmonary arteries are well opacified and evaluated without   evidence of filling defect. The thoracicaorta is normal in caliber with   moderate scattered atherosclerosis.  HEART: Heart size is normal. No pericardial effusion. Coronary artery   calcifications are present.  MEDIASTINUM AND GAEL: No lymphadenopathy. Diffuse markedly distended   esophagus containing fluid, debris, and gas up to the thoracic inlet with   a patent gastroesophageal junction. The lower esophagus is also   thick-walled.  CHEST WALL AND LOWER NECK: Within normal limits.    ABDOMEN AND PELVIS:  LIVER: Within normal limits.  BILE DUCTS: Normal caliber.  GALLBLADDER: Multiple calcified gallstones are present.  SPLEEN: Within normal limits.  PANCREAS: Within normal limits.  ADRENALS: Within normal limits.  KIDNEYS/URETERS: Mild right hydroureteronephrosis to the level of the   right UVJ without appreciable obstructing stone or mass. There are a few   small nonobstructing right intrarenal calculi measuring up to 3 mm in   size. No left urinary tract calculi or hydronephrosis.    BLADDER: Within normal limits.  REPRODUCTIVE ORGANS: Prostate within normal limits.    BOWEL: Postsurgical changes from gastric bypass with a markedly distended   gastric pouch and patent gastroesophageal junction with marked dilatation   of the entirety of the esophagus as detailed above. Appendix is normal.   There is a large amount stool throughout the colon.  PERITONEUM/RETROPERITONEUM: Within normal limits.  VESSELS: Extensive atelectatic calcifications of the aorta, iliofemoral   arteries, and medium and small caliber arteries suggestive of chronic   diabetes. No aortic aneurysm. No DVT identified within the pelvis.  LYMPH NODES: No lymphadenopathy.  ABDOMINAL WALL: Small right inguinal hernia containing a nonobstructed   loop of bowel. Body wall edema is noted.  BONES: The bones are diffusely osteopenic and there are multilevel   degenerative changes of the spine. There is grade 1 anterolisthesis of L4   relative to L5. There is a chronic healed S2 sacral fracture and gopi and   screw fixation of the proximal right femur with surrounding heterotopic   ossification and incompletely healing of the femoral fracture. No acute   fractures identified.    IMPRESSION:  1. No evidence of pulmonary embolism.  2. Small right and trace left pleural effusions with adjacent atelectasis   and interval development of focal consolidative opacity adjacent to a   posterior left upper lobe pleural plaque, either representing progressive   scarring or superimposed pneumonia. Follow-up chest CT is suggested in 3   months.  3. Status post gastric bypass with a markedly distended gastric pouch and   esophagus with wall thickening of the distal esophagus. This places the   patient at increased risk of aspiration.  4. Mild right hydroureteronephrosis to the level of the right UVJ without   interval obstructing stone or mass with symmetric enhancement of the   kidneys. This is nonspecific and could be due to a UVJ stricture.  5. There are a few small nonobstructing right intrarenal calculi   measuring up to 3 mm in size.  6. Cholelithiasis.  7. No evidence of malignancy within the chest, abdomen, or pelvis.    --- End of Report ---            JACY LOREDO MD; Attending Radiologist  This document has been electronically signed. May 24 2025  8:42PM    < end of copied text >    < from: US Abdomen Complete (US Abdomen Complete .) (25 @ 12:19) >    ACC: [id number] EXAM:  US ABDOMEN COMPLETE   ORDERED BY: LARS KWOK     PROCEDURE DATE:  2025          INTERPRETATION:  CLINICAL INFORMATION: Anasarca, abnormal enzymes    COMPARISON: CT 2025    TECHNIQUE: Sonography of the abdomen.Technologist notes exam limited.   Limited acoustic windows    FINDINGS:  Liver: Within normal limits.  Bile ducts: Normal caliber. Common bile duct measures 10 mm.  Gallbladder: Multiple subcentimeter-sized gallstones. No pericholecystic   edema or palpable tenderness  Pancreas: Obscured by intestinal bowel gas.  Spleen: 9.0 cm. Within normal limits.  Right kidney: 11 cm.. Moderate hydronephrosis, corresponding to CT  Left kidney: 11.7 cm. No hydronephrosis.  Ascites: A sizable ascites. Incidental pleural effusions  Aorta and IVC: Visualized portions are within normal limits.    IMPRESSION:  1.  Cholelithiasis without secondary signs of cholecystitis. 10 mm CBD   ectasia. Clinical correlation recommended.  2.  Moderate RIGHT hydronephrosis, unchanged          --- End of Report ---      SHAN LOPEZ MD; Attending Radiologist  This document has been electronically signed. May 26 2025  9:37AM    < end of copied text >

## 2025-05-29 NOTE — PROGRESS NOTE ADULT - PROVIDER SPECIALTY LIST ADULT
Cardiology
Cardiology
Hospitalist
Internal Medicine
Gastroenterology
Heme/Onc
Hospitalist
Hospitalist
Cardiology
Cardiology
Gastroenterology

## 2025-05-29 NOTE — SOCIAL WORK PROGRESS NOTE - NSSWPROGRESSNOTE_GEN_ALL_CORE
Patient to be discharged to [facility] at 4:30 via ambulette ( Ambulunze ) . Wife will call to pay. Patient in agreement. Copy of chart to follow.

## 2025-05-29 NOTE — DISCHARGE NOTE PROVIDER - NSDCMRMEDTOKEN_GEN_ALL_CORE_FT
apixaban 5 mg oral tablet: 2 tab(s) orally every 12 hours  aspirin 81 mg oral delayed release tablet: 1 tab(s) orally once a day  atorvastatin 20 mg oral tablet: 1 tab(s) orally once a day (at bedtime)  furosemide 20 mg oral tablet: 1 tab(s) orally once a day  lactulose 10 g/15 mL oral syrup: 15 milliliter(s) orally 2 times a day  melatonin 3 mg oral tablet: 1 tab(s) orally once a day (at bedtime) As needed Insomnia  pancrelipase 36,000 units-114,000 units-180,000 units oral delayed release capsule: 2 cap(s) orally 3 times a day (with meals)  Pepcid 20 mg oral tablet: 1 tab(s) orally once a day

## 2025-06-09 PROCEDURE — 82728 ASSAY OF FERRITIN: CPT

## 2025-06-09 PROCEDURE — 36415 COLL VENOUS BLD VENIPUNCTURE: CPT

## 2025-06-09 PROCEDURE — 97161 PT EVAL LOW COMPLEX 20 MIN: CPT | Mod: GP

## 2025-06-09 PROCEDURE — 82746 ASSAY OF FOLIC ACID SERUM: CPT

## 2025-06-09 PROCEDURE — 97165 OT EVAL LOW COMPLEX 30 MIN: CPT | Mod: GO

## 2025-06-09 PROCEDURE — 97116 GAIT TRAINING THERAPY: CPT | Mod: GP

## 2025-06-09 PROCEDURE — 97542 WHEELCHAIR MNGMENT TRAINING: CPT | Mod: GO

## 2025-06-09 PROCEDURE — 97530 THERAPEUTIC ACTIVITIES: CPT | Mod: GP

## 2025-06-09 PROCEDURE — 87635 SARS-COV-2 COVID-19 AMP PRB: CPT

## 2025-06-09 PROCEDURE — 82310 ASSAY OF CALCIUM: CPT

## 2025-06-09 PROCEDURE — 82306 VITAMIN D 25 HYDROXY: CPT

## 2025-06-09 PROCEDURE — 83540 ASSAY OF IRON: CPT

## 2025-06-09 PROCEDURE — 93970 EXTREMITY STUDY: CPT

## 2025-06-09 PROCEDURE — 80076 HEPATIC FUNCTION PANEL: CPT

## 2025-06-09 PROCEDURE — 80053 COMPREHEN METABOLIC PANEL: CPT

## 2025-06-09 PROCEDURE — 97110 THERAPEUTIC EXERCISES: CPT | Mod: GO

## 2025-06-09 PROCEDURE — 97112 NEUROMUSCULAR REEDUCATION: CPT | Mod: GP

## 2025-06-09 PROCEDURE — 85025 COMPLETE CBC W/AUTO DIFF WBC: CPT

## 2025-06-09 PROCEDURE — 81001 URINALYSIS AUTO W/SCOPE: CPT

## 2025-06-09 PROCEDURE — 97535 SELF CARE MNGMENT TRAINING: CPT | Mod: GO

## 2025-06-09 PROCEDURE — 84443 ASSAY THYROID STIM HORMONE: CPT

## 2025-06-09 PROCEDURE — 82652 VIT D 1 25-DIHYDROXY: CPT

## 2025-06-09 PROCEDURE — 83550 IRON BINDING TEST: CPT

## 2025-06-09 PROCEDURE — 83970 ASSAY OF PARATHORMONE: CPT

## 2025-06-18 ENCOUNTER — APPOINTMENT (OUTPATIENT)
Dept: PHYSICAL MEDICINE AND REHAB | Facility: CLINIC | Age: 80
End: 2025-06-18

## 2025-07-10 NOTE — ED ADULT TRIAGE NOTE - WEIGHT IN KG
70.3
88 year old PMH significant of  cardiomyopathy, AFib on Eliquis Aortic stenosis, CAD moderate  LAD disease  HTN, HLD, ADRY on CPAP, cognitive impairment, T2DM, DVT, Factor V Leiden; c/o gait instability worsening over time; imaging done; diagnosed with normal pressure hydrocephalus; he presents to [facility] for planned insertion of lumbar drain

## 2025-07-30 ENCOUNTER — APPOINTMENT (OUTPATIENT)
Dept: CARDIOLOGY | Facility: CLINIC | Age: 80
End: 2025-07-30

## 2025-09-19 ENCOUNTER — APPOINTMENT (OUTPATIENT)
Dept: ORTHOPEDIC SURGERY | Facility: CLINIC | Age: 80
End: 2025-09-19

## 2025-09-19 PROCEDURE — 73552 X-RAY EXAM OF FEMUR 2/>: CPT | Mod: RT

## 2025-09-19 PROCEDURE — 99213 OFFICE O/P EST LOW 20 MIN: CPT

## 2025-09-19 PROCEDURE — 73502 X-RAY EXAM HIP UNI 2-3 VIEWS: CPT | Mod: RT

## (undated) DEVICE — DRSG ACE BANDAGE 4" NS

## (undated) DEVICE — DRILL BIT SYNTHES ORTHO 4.2X145MM

## (undated) DEVICE — SYNTHES REAMING ROD WITH BALL TIP 2.5MM 950MM

## (undated) DEVICE — SYNTHES REAMING ROD WITH BALL TIP AND EXTENSION 2.5MM 950MM

## (undated) DEVICE — PREP CHLORAPREP HI-LITE ORANGE 26ML

## (undated) DEVICE — SUT POLYSORB 2-0 30" GS-21 UNDYED

## (undated) DEVICE — DRAPE ISOLATION 125X83"

## (undated) DEVICE — DRAPE INSTRUMENT POUCH 6.75" X 11"

## (undated) DEVICE — POSITIONER STRAP ARMBOARD VELCRO TS-30

## (undated) DEVICE — GLV 7.5 PROTEXIS (WHITE)

## (undated) DEVICE — WARMING BLANKET UPPER ADULT

## (undated) DEVICE — PACK HIP FRACTURE

## (undated) DEVICE — GLV 7.5 PROTEXIS (BLUE)

## (undated) DEVICE — SUT POLYSORB 0 30" GS-21 UNDYED

## (undated) DEVICE — SOLIDIFIER CANN EXPRESS 3K

## (undated) DEVICE — VENODYNE/SCD SLEEVE CALF LARGE

## (undated) DEVICE — GUIDEWIRE SYNTHES 3.2MM X 400MM

## (undated) DEVICE — CANISTER SUCTION LID GUARD 3000CC

## (undated) DEVICE — SUT POLYSORB 1-0 36" GS-21 UNDYED

## (undated) DEVICE — POSITIONER FOAM HEAD CRADLE (PINK)

## (undated) DEVICE — STAPLER SKIN VISI-STAT 35 WIDE

## (undated) DEVICE — FOLEY TRAY 16FR SURESTEP LTX BG

## (undated) DEVICE — DRAPE C ARM UNIVERSAL